# Patient Record
Sex: MALE | Race: OTHER | HISPANIC OR LATINO | Employment: FULL TIME | URBAN - METROPOLITAN AREA
[De-identification: names, ages, dates, MRNs, and addresses within clinical notes are randomized per-mention and may not be internally consistent; named-entity substitution may affect disease eponyms.]

---

## 2024-01-04 ENCOUNTER — HOSPITAL ENCOUNTER (INPATIENT)
Facility: HOSPITAL | Age: 44
LOS: 2 days | Discharge: HOME | DRG: 048 | End: 2024-01-07
Attending: EMERGENCY MEDICINE | Admitting: INTERNAL MEDICINE
Payer: COMMERCIAL

## 2024-01-04 DIAGNOSIS — H70.90 MASTOIDITIS: ICD-10-CM

## 2024-01-04 DIAGNOSIS — H70.001 MASTOIDITIS, ACUTE, RIGHT: ICD-10-CM

## 2024-01-04 DIAGNOSIS — G51.0 CRANIAL NERVE VII PALSY: Primary | ICD-10-CM

## 2024-01-04 PROCEDURE — 99284 EMERGENCY DEPT VISIT MOD MDM: CPT

## 2024-01-05 ENCOUNTER — APPOINTMENT (EMERGENCY)
Dept: CT IMAGING | Facility: HOSPITAL | Age: 44
DRG: 048 | End: 2024-01-05
Payer: COMMERCIAL

## 2024-01-05 PROBLEM — K05.6 PERIODONTAL DISEASE: Status: ACTIVE | Noted: 2024-01-05

## 2024-01-05 PROBLEM — H70.001 MASTOIDITIS, ACUTE, RIGHT: Status: ACTIVE | Noted: 2024-01-05

## 2024-01-05 PROBLEM — G51.0 CRANIAL NERVE VII PALSY: Status: ACTIVE | Noted: 2024-01-05

## 2024-01-05 LAB
ALBUMIN SERPL BCP-MCNC: 4.3 G/DL (ref 3.5–5)
ALP SERPL-CCNC: 61 U/L (ref 34–104)
ALT SERPL W P-5'-P-CCNC: 30 U/L (ref 7–52)
ANION GAP SERPL CALCULATED.3IONS-SCNC: 7 MMOL/L
ANION GAP SERPL CALCULATED.3IONS-SCNC: 8 MMOL/L
APTT PPP: 33 SECONDS (ref 23–37)
AST SERPL W P-5'-P-CCNC: 15 U/L (ref 13–39)
B BURGDOR IGG+IGM SER QL IA: NEGATIVE
BASOPHILS # BLD AUTO: 0.06 THOUSANDS/ÂΜL (ref 0–0.1)
BASOPHILS # BLD MANUAL: 0 THOUSAND/UL (ref 0–0.1)
BASOPHILS NFR BLD AUTO: 1 % (ref 0–1)
BASOPHILS NFR MAR MANUAL: 0 % (ref 0–1)
BILIRUB SERPL-MCNC: 0.47 MG/DL (ref 0.2–1)
BUN SERPL-MCNC: 12 MG/DL (ref 5–25)
BUN SERPL-MCNC: 13 MG/DL (ref 5–25)
CALCIUM SERPL-MCNC: 8.7 MG/DL (ref 8.4–10.2)
CALCIUM SERPL-MCNC: 9.2 MG/DL (ref 8.4–10.2)
CHLORIDE SERPL-SCNC: 109 MMOL/L (ref 96–108)
CHLORIDE SERPL-SCNC: 109 MMOL/L (ref 96–108)
CO2 SERPL-SCNC: 24 MMOL/L (ref 21–32)
CO2 SERPL-SCNC: 24 MMOL/L (ref 21–32)
CREAT SERPL-MCNC: 0.88 MG/DL (ref 0.6–1.3)
CREAT SERPL-MCNC: 1.01 MG/DL (ref 0.6–1.3)
EOSINOPHIL # BLD AUTO: 0.09 THOUSAND/ÂΜL (ref 0–0.61)
EOSINOPHIL # BLD MANUAL: 0.26 THOUSAND/UL (ref 0–0.4)
EOSINOPHIL NFR BLD AUTO: 1 % (ref 0–6)
EOSINOPHIL NFR BLD MANUAL: 2 % (ref 0–6)
ERYTHROCYTE [DISTWIDTH] IN BLOOD BY AUTOMATED COUNT: 15 % (ref 11.6–15.1)
ERYTHROCYTE [DISTWIDTH] IN BLOOD BY AUTOMATED COUNT: 15.2 % (ref 11.6–15.1)
FLUAV RNA RESP QL NAA+PROBE: NEGATIVE
FLUBV RNA RESP QL NAA+PROBE: NEGATIVE
GFR SERPL CREATININE-BSD FRML MDRD: 106 ML/MIN/1.73SQ M
GFR SERPL CREATININE-BSD FRML MDRD: 91 ML/MIN/1.73SQ M
GLUCOSE SERPL-MCNC: 114 MG/DL (ref 65–140)
GLUCOSE SERPL-MCNC: 139 MG/DL (ref 65–140)
HCT VFR BLD AUTO: 42.5 % (ref 36.5–49.3)
HCT VFR BLD AUTO: 43.8 % (ref 36.5–49.3)
HGB BLD-MCNC: 14.1 G/DL (ref 12–17)
HGB BLD-MCNC: 14.5 G/DL (ref 12–17)
IMM GRANULOCYTES # BLD AUTO: 0.13 THOUSAND/UL (ref 0–0.2)
IMM GRANULOCYTES NFR BLD AUTO: 1 % (ref 0–2)
INR PPP: 1.03 (ref 0.84–1.19)
LACTATE SERPL-SCNC: 0.9 MMOL/L (ref 0.5–2)
LG PLATELETS BLD QL SMEAR: PRESENT
LYMPHOCYTES # BLD AUTO: 24 % (ref 14–44)
LYMPHOCYTES # BLD AUTO: 3.16 THOUSAND/UL (ref 0.6–4.47)
LYMPHOCYTES # BLD AUTO: 3.31 THOUSANDS/ÂΜL (ref 0.6–4.47)
LYMPHOCYTES NFR BLD AUTO: 29 % (ref 14–44)
MAGNESIUM SERPL-MCNC: 2.1 MG/DL (ref 1.9–2.7)
MCH RBC QN AUTO: 28.3 PG (ref 26.8–34.3)
MCH RBC QN AUTO: 28.4 PG (ref 26.8–34.3)
MCHC RBC AUTO-ENTMCNC: 33.1 G/DL (ref 31.4–37.4)
MCHC RBC AUTO-ENTMCNC: 33.2 G/DL (ref 31.4–37.4)
MCV RBC AUTO: 85 FL (ref 82–98)
MCV RBC AUTO: 86 FL (ref 82–98)
MONOCYTES # BLD AUTO: 1.2 THOUSAND/ÂΜL (ref 0.17–1.22)
MONOCYTES # BLD AUTO: 1.71 THOUSAND/UL (ref 0–1.22)
MONOCYTES NFR BLD AUTO: 11 % (ref 4–12)
MONOCYTES NFR BLD: 13 % (ref 4–12)
NEUTROPHILS # BLD AUTO: 6.58 THOUSANDS/ÂΜL (ref 1.85–7.62)
NEUTROPHILS # BLD MANUAL: 8.03 THOUSAND/UL (ref 1.85–7.62)
NEUTS BAND NFR BLD MANUAL: 11 % (ref 0–8)
NEUTS SEG NFR BLD AUTO: 50 % (ref 43–75)
NEUTS SEG NFR BLD AUTO: 57 % (ref 43–75)
NRBC BLD AUTO-RTO: 0 /100 WBCS
PLATELET # BLD AUTO: 311 THOUSANDS/UL (ref 149–390)
PLATELET # BLD AUTO: 334 THOUSANDS/UL (ref 149–390)
PLATELET BLD QL SMEAR: ADEQUATE
PMV BLD AUTO: 10.3 FL (ref 8.9–12.7)
PMV BLD AUTO: 10.3 FL (ref 8.9–12.7)
POLYCHROMASIA BLD QL SMEAR: ABNORMAL
POTASSIUM SERPL-SCNC: 3.8 MMOL/L (ref 3.5–5.3)
POTASSIUM SERPL-SCNC: 3.8 MMOL/L (ref 3.5–5.3)
PROCALCITONIN SERPL-MCNC: 0.05 NG/ML
PROT SERPL-MCNC: 7.8 G/DL (ref 6.4–8.4)
PROTHROMBIN TIME: 13.7 SECONDS (ref 11.6–14.5)
RBC # BLD AUTO: 4.96 MILLION/UL (ref 3.88–5.62)
RBC # BLD AUTO: 5.13 MILLION/UL (ref 3.88–5.62)
RBC MORPH BLD: PRESENT
ROULEAUX BLD QL SMEAR: PRESENT
RSV RNA RESP QL NAA+PROBE: NEGATIVE
SARS-COV-2 RNA RESP QL NAA+PROBE: NEGATIVE
SODIUM SERPL-SCNC: 140 MMOL/L (ref 135–147)
SODIUM SERPL-SCNC: 141 MMOL/L (ref 135–147)
WBC # BLD AUTO: 11.37 THOUSAND/UL (ref 4.31–10.16)
WBC # BLD AUTO: 13.16 THOUSAND/UL (ref 4.31–10.16)

## 2024-01-05 PROCEDURE — 85027 COMPLETE CBC AUTOMATED: CPT

## 2024-01-05 PROCEDURE — 87040 BLOOD CULTURE FOR BACTERIA: CPT

## 2024-01-05 PROCEDURE — 87070 CULTURE OTHR SPECIMN AEROBIC: CPT | Performed by: OTOLARYNGOLOGY

## 2024-01-05 PROCEDURE — G1004 CDSM NDSC: HCPCS

## 2024-01-05 PROCEDURE — 99254 IP/OBS CNSLTJ NEW/EST MOD 60: CPT | Performed by: STUDENT IN AN ORGANIZED HEALTH CARE EDUCATION/TRAINING PROGRAM

## 2024-01-05 PROCEDURE — 70481 CT ORBIT/EAR/FOSSA W/DYE: CPT

## 2024-01-05 PROCEDURE — 99285 EMERGENCY DEPT VISIT HI MDM: CPT

## 2024-01-05 PROCEDURE — 84145 PROCALCITONIN (PCT): CPT

## 2024-01-05 PROCEDURE — 80048 BASIC METABOLIC PNL TOTAL CA: CPT

## 2024-01-05 PROCEDURE — 83735 ASSAY OF MAGNESIUM: CPT

## 2024-01-05 PROCEDURE — 87529 HSV DNA AMP PROBE: CPT

## 2024-01-05 PROCEDURE — 80053 COMPREHEN METABOLIC PANEL: CPT

## 2024-01-05 PROCEDURE — 85730 THROMBOPLASTIN TIME PARTIAL: CPT

## 2024-01-05 PROCEDURE — 85025 COMPLETE CBC W/AUTO DIFF WBC: CPT

## 2024-01-05 PROCEDURE — 96375 TX/PRO/DX INJ NEW DRUG ADDON: CPT

## 2024-01-05 PROCEDURE — 96365 THER/PROPH/DIAG IV INF INIT: CPT

## 2024-01-05 PROCEDURE — 87205 SMEAR GRAM STAIN: CPT | Performed by: OTOLARYNGOLOGY

## 2024-01-05 PROCEDURE — 83605 ASSAY OF LACTIC ACID: CPT

## 2024-01-05 PROCEDURE — 99222 1ST HOSP IP/OBS MODERATE 55: CPT

## 2024-01-05 PROCEDURE — 36415 COLL VENOUS BLD VENIPUNCTURE: CPT

## 2024-01-05 PROCEDURE — 85007 BL SMEAR W/DIFF WBC COUNT: CPT

## 2024-01-05 PROCEDURE — 96361 HYDRATE IV INFUSION ADD-ON: CPT

## 2024-01-05 PROCEDURE — 87147 CULTURE TYPE IMMUNOLOGIC: CPT | Performed by: OTOLARYNGOLOGY

## 2024-01-05 PROCEDURE — 0241U HB NFCT DS VIR RESP RNA 4 TRGT: CPT

## 2024-01-05 PROCEDURE — 85610 PROTHROMBIN TIME: CPT

## 2024-01-05 PROCEDURE — 70487 CT MAXILLOFACIAL W/DYE: CPT

## 2024-01-05 PROCEDURE — 70460 CT HEAD/BRAIN W/DYE: CPT

## 2024-01-05 PROCEDURE — 86618 LYME DISEASE ANTIBODY: CPT

## 2024-01-05 RX ORDER — ERYTHROMYCIN 5 MG/G
0.5 OINTMENT OPHTHALMIC
Status: DISCONTINUED | OUTPATIENT
Start: 2024-01-05 | End: 2024-01-07 | Stop reason: HOSPADM

## 2024-01-05 RX ORDER — PREDNISONE 20 MG/1
60 TABLET ORAL DAILY
Status: DISCONTINUED | OUTPATIENT
Start: 2024-01-05 | End: 2024-01-05

## 2024-01-05 RX ORDER — PANTOPRAZOLE SODIUM 40 MG/1
40 TABLET, DELAYED RELEASE ORAL
Status: DISCONTINUED | OUTPATIENT
Start: 2024-01-06 | End: 2024-01-07 | Stop reason: HOSPADM

## 2024-01-05 RX ORDER — ACETAMINOPHEN 325 MG/1
650 TABLET ORAL EVERY 6 HOURS PRN
Status: DISCONTINUED | OUTPATIENT
Start: 2024-01-05 | End: 2024-01-07 | Stop reason: HOSPADM

## 2024-01-05 RX ORDER — DEXAMETHASONE SODIUM PHOSPHATE 10 MG/ML
10 INJECTION, SOLUTION INTRAMUSCULAR; INTRAVENOUS EVERY 8 HOURS SCHEDULED
Status: DISCONTINUED | OUTPATIENT
Start: 2024-01-05 | End: 2024-01-07 | Stop reason: HOSPADM

## 2024-01-05 RX ORDER — CIPROFLOXACIN AND DEXAMETHASONE 3; 1 MG/ML; MG/ML
4 SUSPENSION/ DROPS AURICULAR (OTIC) 2 TIMES DAILY
Status: DISCONTINUED | OUTPATIENT
Start: 2024-01-05 | End: 2024-01-07 | Stop reason: HOSPADM

## 2024-01-05 RX ORDER — ONDANSETRON 2 MG/ML
4 INJECTION INTRAMUSCULAR; INTRAVENOUS EVERY 6 HOURS PRN
Status: DISCONTINUED | OUTPATIENT
Start: 2024-01-05 | End: 2024-01-07 | Stop reason: HOSPADM

## 2024-01-05 RX ORDER — ENOXAPARIN SODIUM 100 MG/ML
40 INJECTION SUBCUTANEOUS DAILY
Status: DISCONTINUED | OUTPATIENT
Start: 2024-01-05 | End: 2024-01-07 | Stop reason: HOSPADM

## 2024-01-05 RX ORDER — MECLIZINE HYDROCHLORIDE 25 MG/1
25 TABLET ORAL ONCE
Status: COMPLETED | OUTPATIENT
Start: 2024-01-05 | End: 2024-01-05

## 2024-01-05 RX ORDER — SODIUM CHLORIDE 9 MG/ML
3 INJECTION INTRAVENOUS ONCE
Status: COMPLETED | OUTPATIENT
Start: 2024-01-05 | End: 2024-01-05

## 2024-01-05 RX ORDER — MAGNESIUM HYDROXIDE/ALUMINUM HYDROXICE/SIMETHICONE 120; 1200; 1200 MG/30ML; MG/30ML; MG/30ML
30 SUSPENSION ORAL EVERY 6 HOURS PRN
Status: DISCONTINUED | OUTPATIENT
Start: 2024-01-05 | End: 2024-01-07 | Stop reason: HOSPADM

## 2024-01-05 RX ORDER — MECLIZINE HCL 12.5 MG/1
25 TABLET ORAL EVERY 8 HOURS PRN
Status: DISCONTINUED | OUTPATIENT
Start: 2024-01-05 | End: 2024-01-07 | Stop reason: HOSPADM

## 2024-01-05 RX ORDER — MINERAL OIL AND PETROLATUM 150; 830 MG/G; MG/G
OINTMENT OPHTHALMIC 3 TIMES DAILY
Status: DISCONTINUED | OUTPATIENT
Start: 2024-01-05 | End: 2024-01-07 | Stop reason: HOSPADM

## 2024-01-05 RX ADMIN — SODIUM CHLORIDE 3 G: 9 INJECTION, SOLUTION INTRAVENOUS at 01:58

## 2024-01-05 RX ADMIN — MECLIZINE HYDROCHLORIDE 25 MG: 25 TABLET ORAL at 05:00

## 2024-01-05 RX ADMIN — VANCOMYCIN HYDROCHLORIDE 2000 MG: 1 INJECTION, POWDER, LYOPHILIZED, FOR SOLUTION INTRAVENOUS at 05:13

## 2024-01-05 RX ADMIN — IOHEXOL 100 ML: 350 INJECTION, SOLUTION INTRAVENOUS at 02:26

## 2024-01-05 RX ADMIN — ACETAMINOPHEN 325MG 650 MG: 325 TABLET ORAL at 05:11

## 2024-01-05 RX ADMIN — VANCOMYCIN HYDROCHLORIDE 1250 MG: 5 INJECTION, POWDER, LYOPHILIZED, FOR SOLUTION INTRAVENOUS at 20:04

## 2024-01-05 RX ADMIN — SODIUM CHLORIDE 1000 ML: 0.9 INJECTION, SOLUTION INTRAVENOUS at 01:46

## 2024-01-05 RX ADMIN — GLYCERIN 2 DROP: .002; .002; .01 SOLUTION/ DROPS OPHTHALMIC at 18:53

## 2024-01-05 RX ADMIN — SODIUM CHLORIDE 3 G: 9 INJECTION, SOLUTION INTRAVENOUS at 21:35

## 2024-01-05 RX ADMIN — SODIUM CHLORIDE 3 G: 9 INJECTION, SOLUTION INTRAVENOUS at 08:45

## 2024-01-05 RX ADMIN — PREDNISONE 60 MG: 20 TABLET ORAL at 08:43

## 2024-01-05 RX ADMIN — MINERAL OIL AND PETROLATUM: 319; 577 GEL OPHTHALMIC at 18:53

## 2024-01-05 RX ADMIN — SODIUM CHLORIDE, PRESERVATIVE FREE 3 ML: 5 INJECTION INTRAVENOUS at 01:58

## 2024-01-05 RX ADMIN — GLYCERIN 2 DROP: .002; .002; .01 SOLUTION/ DROPS OPHTHALMIC at 18:51

## 2024-01-05 RX ADMIN — SODIUM CHLORIDE 3 G: 9 INJECTION, SOLUTION INTRAVENOUS at 16:12

## 2024-01-05 RX ADMIN — DEXAMETHASONE SODIUM PHOSPHATE 10 MG: 10 INJECTION INTRAMUSCULAR; INTRAVENOUS at 18:13

## 2024-01-05 RX ADMIN — ERYTHROMYCIN 0.5 INCH: 5 OINTMENT OPHTHALMIC at 21:35

## 2024-01-05 RX ADMIN — CIPROFLOXACIN AND DEXAMETHASONE 4 DROP: 3; 1 SUSPENSION/ DROPS AURICULAR (OTIC) at 10:37

## 2024-01-05 RX ADMIN — ENOXAPARIN SODIUM 40 MG: 40 INJECTION SUBCUTANEOUS at 08:44

## 2024-01-05 RX ADMIN — CIPROFLOXACIN AND DEXAMETHASONE 4 DROP: 3; 1 SUSPENSION/ DROPS AURICULAR (OTIC) at 18:51

## 2024-01-05 RX ADMIN — DEXAMETHASONE SODIUM PHOSPHATE 10 MG: 10 INJECTION INTRAMUSCULAR; INTRAVENOUS at 21:35

## 2024-01-05 NOTE — ASSESSMENT & PLAN NOTE
"CT facial bones demonstrating \"Dental caries and periodontal disease, most prominent involving the right maxillary molar and left posterior most mandibular molar. Correlation with the patient's symptoms recommended. \"  Outpatient follow-up  "

## 2024-01-05 NOTE — ASSESSMENT & PLAN NOTE
"Patient with no significant PMHx presented to the ED with complaint of three days of bilateral ear \"fullness,\" right ear pain with clear/yellow drainage, dysequilibrium, and right-sided facial weakness  Patient reports flu-like symptoms over a week ago that resolved prior to the onset of his current symptoms. At first patient reports recurrence of congestion, productive cough, and rhinorrhea. These symptoms were later accompanied by bilateral ear pressure followed by sharp pain in his right ear +drainage, decreased hearing, tinnitus, dizziness, and right facial droop   CT facial bones/orbits/temporal demonstrating \"Fluid in the right mastoid air cells, external auditory canal, and middle ear, suspicious for right-sided otomastoiditis. No discrete bony destruction or abscess is identified. Shotty right cervical chain lymph nodes, nonspecific, possibly reactive. Sinus disease as described; consider acute on chronic sinusitis.\"  WBC 13.16, not fulfilling septic criteria  Procalc 0.05, repeat in AM  Lactic 0.9  Blood cultures pending   Supportive care, pain control  Unasyn and vanc initiated by the ED, continue  ENT and ID consults pending   "

## 2024-01-05 NOTE — PLAN OF CARE
Problem: PAIN - ADULT  Goal: Verbalizes/displays adequate comfort level or baseline comfort level  Description: Interventions:  - Encourage patient to monitor pain and request assistance  - Assess pain using appropriate pain scale  - Administer analgesics based on type and severity of pain and evaluate response  - Implement non-pharmacological measures as appropriate and evaluate response  - Consider cultural and social influences on pain and pain management  - Notify physician/advanced practitioner if interventions unsuccessful or patient reports new pain  Outcome: Progressing     Problem: INFECTION - ADULT  Goal: Absence or prevention of progression during hospitalization  Description: INTERVENTIONS:  - Assess and monitor for signs and symptoms of infection  - Monitor lab/diagnostic results  - Monitor all insertion sites, i.e. indwelling lines, tubes, and drains  - Monitor endotracheal if appropriate and nasal secretions for changes in amount and color  - Cameron appropriate cooling/warming therapies per order  - Administer medications as ordered  - Instruct and encourage patient and family to use good hand hygiene technique  - Identify and instruct in appropriate isolation precautions for identified infection/condition  Outcome: Progressing  Goal: Absence of fever/infection during neutropenic period  Description: INTERVENTIONS:  - Monitor WBC    Outcome: Progressing     Problem: SAFETY ADULT  Goal: Patient will remain free of falls  Description: INTERVENTIONS:  - Educate patient/family on patient safety including physical limitations  - Instruct patient to call for assistance with activity   - Consult OT/PT to assist with strengthening/mobility   - Keep Call bell within reach  - Keep bed low and locked with side rails adjusted as appropriate  - Keep care items and personal belongings within reach  - Initiate and maintain comfort rounds  - Make Fall Risk Sign visible to staff  - Offer Toileting every  Hours,  in advance of need  - Initiate/Maintain alarm  - Obtain necessary fall risk management equipment:   - Apply yellow socks and bracelet for high fall risk patients  - Consider moving patient to room near nurses station  Outcome: Progressing  Goal: Maintain or return to baseline ADL function  Description: INTERVENTIONS:  -  Assess patient's ability to carry out ADLs; assess patient's baseline for ADL function and identify physical deficits which impact ability to perform ADLs (bathing, care of mouth/teeth, toileting, grooming, dressing, etc.)  - Assess/evaluate cause of self-care deficits   - Assess range of motion  - Assess patient's mobility; develop plan if impaired  - Assess patient's need for assistive devices and provide as appropriate  - Encourage maximum independence but intervene and supervise when necessary  - Involve family in performance of ADLs  - Assess for home care needs following discharge   - Consider OT consult to assist with ADL evaluation and planning for discharge  - Provide patient education as appropriate  Outcome: Progressing  Goal: Maintains/Returns to pre admission functional level  Description: INTERVENTIONS:  - Perform AM-PAC 6 Click Basic Mobility/ Daily Activity assessment daily.  - Set and communicate daily mobility goal to care team and patient/family/caregiver.   - Collaborate with rehabilitation services on mobility goals if consulted  - Perform Range of Motion  times a day.  - Reposition patient every  hours.  - Dangle patient  times a day  - Stand patient  times a day  - Ambulate patient  times a day  - Out of bed to chair  times a day   - Out of bed for meal times a day  - Out of bed for toileting  - Record patient progress and toleration of activity level   Outcome: Progressing     Problem: DISCHARGE PLANNING  Goal: Discharge to home or other facility with appropriate resources  Description: INTERVENTIONS:  - Identify barriers to discharge w/patient and caregiver  - Arrange for  needed discharge resources and transportation as appropriate  - Identify discharge learning needs (meds, wound care, etc.)  - Arrange for interpretive services to assist at discharge as needed  - Refer to Case Management Department for coordinating discharge planning if the patient needs post-hospital services based on physician/advanced practitioner order or complex needs related to functional status, cognitive ability, or social support system  Outcome: Progressing     Problem: Knowledge Deficit  Goal: Patient/family/caregiver demonstrates understanding of disease process, treatment plan, medications, and discharge instructions  Description: Complete learning assessment and assess knowledge base.  Interventions:  - Provide teaching at level of understanding  - Provide teaching via preferred learning methods  Outcome: Progressing

## 2024-01-05 NOTE — ED PROVIDER NOTES
History  Chief Complaint   Patient presents with    Earache     Right sided ear pain, causing pain and dizziness and swelling around mouth     43-year-old male with no reported past medical history presents ED with complaints of right ear pain, muffled hearing, R ear drainage and weakness over the right side of his face. Patient reports he was having URI symptoms last week.  Reports he started to feel better but when he came back home from [place] his kids were sick and he got sick again.  Reports he was having some cough, nasal congestion and rhinorrhea.  Was having ear congestion bilaterally, ear pressure and muffled hearing which seemed to be worse in his right ear a couple days ago.  Reports the pressure sensation turned into a sharp stabbing pain in the right ear and he began having some clear/yellow discharge from the right ear which has been persistent. About 2 days ago he also noticed that the right side of his face seemed like it was drooping down and he was unable to lift up his eyebrow, close his eye completely and use the right side of his mouth.  When he drank water it was coming out of the right side of his mouth. He felt he had a talk on the left side of his mouth.  He has never had this before.  Has also been having intermittent vertiginous type symptoms which feels like the room is spinning and he is off balance.  He feels that way slightly now but has been worse previously. Also having some tinnitus in the right ear. Nausea with the vertiginous symptoms but no associated vomiting.  Feels like the right side of his face and right side of his lips are swollen.  No fevers or chills.  No neck pain or stiffness.  No associated headache, confusion, speech problems, dysphagia, double vision, visual field loss, vision changes, UE/LE paresthesia/anesthesia/weakness or any other complaints of focal deficits.  Denies chest pain, shortness of breath, palpitations, lightheadedness, syncope, abdominal pain,  N/V/D, urinary complaints, rash, weakness and any other specific complaint.  Has been taking ibuprofen as needed for pain control.  Took 1 dose of amoxicillin last night, reports it felt like it improved his swelling and pain but now he is having the pain in his right ear.        Earache  Associated symptoms: congestion, cough, ear discharge, rhinorrhea and sore throat        None       History reviewed. No pertinent past medical history.    History reviewed. No pertinent surgical history.    History reviewed. No pertinent family history.  I have reviewed and agree with the history as documented.    E-Cigarette/Vaping     E-Cigarette/Vaping Substances          Review of Systems   HENT:  Positive for congestion, ear discharge, ear pain, facial swelling (R), rhinorrhea and sore throat.         (+) R sided facial weakness    Respiratory:  Positive for cough.    Gastrointestinal:  Positive for nausea.   Neurological:  Positive for dizziness and facial asymmetry.       Physical Exam  Physical Exam  Vitals and nursing note reviewed.   Constitutional:       General: He is not in acute distress.     Appearance: He is well-developed.      Comments: Awake, alert, interactive and resting in the stretcher in no acute distress.  Patient is not ill or toxic appearing.     HENT:      Head: Normocephalic and atraumatic.      Comments: Patient with mild swelling over the right side of the face with no specific erythema, bruising, skin disruption, rash or vesicles.  Patient unable to raise the right eyebrow.  Able to close the right eye but not able to squint.  Unable to lift the right corner of his mouth.  Normal facial movements on the left side of the face.       Left Ear: Tympanic membrane, ear canal and external ear normal.      Ears:      Comments: No specific right external ear findings.  No clinical findings of mastoiditis.  He does have a moderate amount of clear/yellow discharge coming from the right ear canal.  Right ear  canal appears mildly edematous with moist and flaking skin in the ear canal.  R TM appears perforated, unable to appreciate all landmarks of the right TM.  Hearing grossly intact in B/L ears.      Mouth/Throat:      Comments: MMM.  Oropharynx patent and clear. Tongue midline.  Maintaining airway and secretions without issue.  Eyes:      General: Vision grossly intact.      Extraocular Movements:      Right eye: Normal extraocular motion and no nystagmus.      Left eye: Normal extraocular motion and no nystagmus.      Conjunctiva/sclera: Conjunctivae normal.      Pupils: Pupils are equal, round, and reactive to light.   Cardiovascular:      Rate and Rhythm: Normal rate and regular rhythm.      Pulses:           Radial pulses are 2+ on the right side and 2+ on the left side.      Heart sounds: No murmur heard.  Pulmonary:      Effort: Pulmonary effort is normal. No respiratory distress.      Breath sounds: Normal breath sounds.   Abdominal:      Palpations: Abdomen is soft.      Tenderness: There is no abdominal tenderness.   Musculoskeletal:         General: No swelling.      Cervical back: Neck supple.      Right lower leg: No edema.      Left lower leg: No edema.      Comments: Normal muscle tone and moving all extremities without issue.  Sensation intact in B/L face, B/L UE and B/L LE.  Strength 5/5 over left side of the face, B/L UE and B/L LE.   Skin:     General: Skin is warm and dry.      Capillary Refill: Capillary refill takes less than 2 seconds.   Neurological:      General: No focal deficit present.      Mental Status: He is alert and oriented to person, place, and time.      GCS: GCS eye subscore is 4. GCS verbal subscore is 5. GCS motor subscore is 6.      Sensory: Sensation is intact.      Motor: Weakness (R side of face) present.      Coordination: Coordination is intact. Finger-Nose-Finger Test and Heel to Shin Test normal.      Gait: Gait is intact. Gait normal.      Comments: CN II- VII Intact  except CN VII over R side of face.    Psychiatric:         Mood and Affect: Mood normal.         Vital Signs  ED Triage Vitals   Temperature Pulse Respirations Blood Pressure SpO2   01/04/24 2244 01/04/24 2244 01/04/24 2244 01/04/24 2253 01/04/24 2244   98 °F (36.7 °C) 88 18 122/73 98 %      Temp Source Heart Rate Source Patient Position - Orthostatic VS BP Location FiO2 (%)   01/04/24 2244 01/04/24 2244 01/04/24 2244 01/04/24 2244 --   Oral Monitor Sitting Right arm       Pain Score       01/04/24 2244       10 - Worst Possible Pain           Vitals:    01/05/24 0937 01/05/24 1211 01/05/24 1437 01/05/24 1535   BP: 141/66 121/58 127/77 116/76   Pulse: 74 74 79 84   Patient Position - Orthostatic VS: Lying Lying Lying Lying         Visual Acuity  Visual Acuity      Flowsheet Row Most Recent Value   L Pupil Size (mm) 2   R Pupil Size (mm) 2   L Pupil Shape Round   R Pupil Shape Round            ED Medications  Medications   acetaminophen (TYLENOL) tablet 650 mg (650 mg Oral Given 1/5/24 0511)   ondansetron (ZOFRAN) injection 4 mg (has no administration in time range)   aluminum-magnesium hydroxide-simethicone (MAALOX) oral suspension 30 mL (has no administration in time range)   meclizine (ANTIVERT) tablet 25 mg (has no administration in time range)   enoxaparin (LOVENOX) subcutaneous injection 40 mg (40 mg Subcutaneous Given 1/5/24 0844)   ampicillin-sulbactam (UNASYN) 3 g in sodium chloride 0.9 % 100 mL IVPB (0 g Intravenous Stopped 1/5/24 1049)   vancomycin (VANCOCIN) 1,250 mg in sodium chloride 0.9 % 250 mL IVPB (has no administration in time range)   predniSONE tablet 60 mg (60 mg Oral Given 1/5/24 0843)   ciprofloxacin-dexamethasone (CIPRODEX) 0.3-0.1 % otic suspension 4 drop (4 drops Right Ear Given 1/5/24 1037)   glycerin-hypromellose- (ARTIFICIAL TEARS) ophthalmic solution 2 drop (has no administration in time range)   sodium chloride (PF) 0.9 % injection 3 mL (3 mL Intravenous Given 1/5/24 0158)    ampicillin-sulbactam (UNASYN) 3 g in sodium chloride 0.9 % 100 mL IVPB (0 g Intravenous Stopped 1/5/24 0234)   sodium chloride 0.9 % bolus 1,000 mL (0 mL Intravenous Stopped 1/5/24 0332)   iohexol (OMNIPAQUE) 350 MG/ML injection (MULTI-DOSE) 100 mL (100 mL Intravenous Given 1/5/24 0226)   meclizine (ANTIVERT) tablet 25 mg (25 mg Oral Given 1/5/24 0500)   vancomycin (VANCOCIN) 2,000 mg in sodium chloride 0.9 % 500 mL IVPB (0 mg Intravenous Stopped 1/5/24 0840)       Diagnostic Studies  Results Reviewed       Procedure Component Value Units Date/Time    Blood culture #2 [[id number]] Collected: 01/05/24 0142    Lab Status: Preliminary result Specimen: Blood from Arm, Left Updated: 01/05/24 1301     Blood Culture Received in Microbiology Lab. Culture in Progress.    Blood culture #1 [[id number]] Collected: 01/05/24 0147    Lab Status: Preliminary result Specimen: Blood from Arm, Right Updated: 01/05/24 1301     Blood Culture Received in Microbiology Lab. Culture in Progress.    Wound culture and Gram stain [[id number]] Collected: 01/05/24 1001    Lab Status: In process Specimen: Wound from Neck Updated: 01/05/24 1009    Basic metabolic panel [[id number]]  (Abnormal) Collected: 01/05/24 0517    Lab Status: Final result Specimen: Blood from Hand, Right Updated: 01/05/24 0547     Sodium 140 mmol/L      Potassium 3.8 mmol/L      Chloride 109 mmol/L      CO2 24 mmol/L      ANION GAP 7 mmol/L      BUN 12 mg/dL      Creatinine 0.88 mg/dL      Glucose 139 mg/dL      Calcium 8.7 mg/dL      eGFR 106 ml/min/1.73sq m     Narrative:      National Kidney Disease Foundation guidelines for Chronic Kidney Disease (CKD):     Stage 1 with normal or high GFR (GFR > 90 mL/min/1.73 square meters)    Stage 2 Mild CKD (GFR = 60-89 mL/min/1.73 square meters)    Stage 3A Moderate CKD (GFR = 45-59 mL/min/1.73 square meters)    Stage 3B Moderate CKD (GFR = 30-44 mL/min/1.73 square meters)    Stage 4 Severe CKD (GFR = 15-29 mL/min/1.73 square  meters)    Stage 5 End Stage CKD (GFR <15 mL/min/1.73 square meters)  Note: GFR calculation is accurate only with a steady state creatinine    Magnesium [[id number]]  (Normal) Collected: 01/05/24 0517    Lab Status: Final result Specimen: Blood from Hand, Right Updated: 01/05/24 0547     Magnesium 2.1 mg/dL     CBC and differential [[id number]]  (Abnormal) Collected: 01/05/24 0517    Lab Status: Final result Specimen: Blood from Hand, Right Updated: 01/05/24 0531     WBC 11.37 Thousand/uL      RBC 4.96 Million/uL      Hemoglobin 14.1 g/dL      Hematocrit 42.5 %      MCV 86 fL      MCH 28.4 pg      MCHC 33.2 g/dL      RDW 15.2 %      MPV 10.3 fL      Platelets 311 Thousands/uL      nRBC 0 /100 WBCs      Neutrophils Relative 57 %      Immat GRANS % 1 %      Lymphocytes Relative 29 %      Monocytes Relative 11 %      Eosinophils Relative 1 %      Basophils Relative 1 %      Neutrophils Absolute 6.58 Thousands/µL      Immature Grans Absolute 0.13 Thousand/uL      Lymphocytes Absolute 3.31 Thousands/µL      Monocytes Absolute 1.20 Thousand/µL      Eosinophils Absolute 0.09 Thousand/µL      Basophils Absolute 0.06 Thousands/µL     Manual Differential(PHLEBS Do Not Order) [[id number]]  (Abnormal) Collected: 01/05/24 0142    Lab Status: Final result Specimen: Blood from Arm, Left Updated: 01/05/24 0454     Segmented % 50 %      Bands % 11 %      Lymphocytes % 24 %      Monocytes % 13 %      Eosinophils, % 2 %      Basophils % 0 %      Absolute Neutrophils 8.03 Thousand/uL      Lymphocytes Absolute 3.16 Thousand/uL      Monocytes Absolute 1.71 Thousand/uL      Eosinophils Absolute 0.26 Thousand/uL      Basophils Absolute 0.00 Thousand/uL      Total Counted --     RBC Morphology Present     Rouleaux Present     Platelet Estimate Adequate     Large Platelet Present     Polychromasia 1+    FLU/RSV/COVID - if FLU/RSV clinically relevant [[id number]]  (Normal) Collected: 01/05/24 0142    Lab Status: Final result Specimen: Nares from  Nose Updated: 01/05/24 0241     SARS-CoV-2 Negative     INFLUENZA A PCR Negative     INFLUENZA B PCR Negative     RSV PCR Negative    Narrative:      FOR PEDIATRIC PATIENTS - copy/paste COVID Guidelines URL to browser: [email or web address]    SARS-CoV-2 assay is a Nucleic Acid Amplification assay intended for the  qualitative detection of nucleic acid from SARS-CoV-2 in nasopharyngeal  swabs. Results are for the presumptive identification of SARS-CoV-2 RNA.    Positive results are indicative of infection with SARS-CoV-2, the virus  causing COVID-19, but do not rule out bacterial infection or co-infection  with other viruses. Laboratories within the [place] and its  territories are required to report all positive results to the appropriate  public health authorities. Negative results do not preclude SARS-CoV-2  infection and should not be used as the sole basis for treatment or other  patient management decisions. Negative results must be combined with  clinical observations, patient history, and epidemiological information.  This test has not been FDA cleared or approved.    This test has been authorized by FDA under an Emergency Use Authorization  (EUA). This test is only authorized for the duration of time the  declaration that circumstances exist justifying the authorization of the  emergency use of an in vitro diagnostic tests for detection of SARS-CoV-2  virus and/or diagnosis of COVID-19 infection under section 564(b)(1) of  the Act, 21 U.S.C. 360bbb-3(b)(1), unless the authorization is terminated  or revoked sooner. The test has been validated but independent review by FDA  and CLIA is pending.    Test performed using Scope 5: This RT-PCR assay targets N2,  a region unique to SARS-CoV-2. A conserved region in the E-gene was chosen  for pan-Sarbecovirus detection which includes SARS-CoV-2.    According to CMS-2020-01-R, this platform meets  the definition of high-throughput technology.    Procalcitonin [[id number]]  (Normal) Collected: 01/05/24 0142    Lab Status: Final result Specimen: Blood from Arm, Left Updated: 01/05/24 0229     Procalcitonin 0.05 ng/ml     CBC and differential [[id number]]  (Abnormal) Collected: 01/05/24 0142    Lab Status: Final result Specimen: Blood from Arm, Left Updated: 01/05/24 0224     WBC 13.16 Thousand/uL      RBC 5.13 Million/uL      Hemoglobin 14.5 g/dL      Hematocrit 43.8 %      MCV 85 fL      MCH 28.3 pg      MCHC 33.1 g/dL      RDW 15.0 %      MPV 10.3 fL      Platelets 334 Thousands/uL     Protime-INR [[id number]]  (Normal) Collected: 01/05/24 0142    Lab Status: Final result Specimen: Blood from Arm, Left Updated: 01/05/24 0221     Protime 13.7 seconds      INR 1.03    APTT [[id number]]  (Normal) Collected: 01/05/24 0142    Lab Status: Final result Specimen: Blood from Arm, Left Updated: 01/05/24 0221     PTT 33 seconds     Lactic acid [[id number]]  (Normal) Collected: 01/05/24 0142    Lab Status: Final result Specimen: Blood from Arm, Left Updated: 01/05/24 0218     LACTIC ACID 0.9 mmol/L     Narrative:      Result may be elevated if tourniquet was used during collection.    Comprehensive metabolic panel [[id number]]  (Abnormal) Collected: 01/05/24 0142    Lab Status: Final result Specimen: Blood from Arm, Left Updated: 01/05/24 0218     Sodium 141 mmol/L      Potassium 3.8 mmol/L      Chloride 109 mmol/L      CO2 24 mmol/L      ANION GAP 8 mmol/L      BUN 13 mg/dL      Creatinine 1.01 mg/dL      Glucose 114 mg/dL      Calcium 9.2 mg/dL      AST 15 U/L      ALT 30 U/L      Alkaline Phosphatase 61 U/L      Total Protein 7.8 g/dL      Albumin 4.3 g/dL      Total Bilirubin 0.47 mg/dL      eGFR 91 ml/min/1.73sq m     Narrative:      National Kidney Disease Foundation guidelines for Chronic Kidney Disease (CKD):     Stage 1 with normal or high GFR (GFR > 90 mL/min/1.73 square meters)    Stage 2 Mild CKD (GFR = 60-89  mL/min/1.73 square meters)    Stage 3A Moderate CKD (GFR = 45-59 mL/min/1.73 square meters)    Stage 3B Moderate CKD (GFR = 30-44 mL/min/1.73 square meters)    Stage 4 Severe CKD (GFR = 15-29 mL/min/1.73 square meters)    Stage 5 End Stage CKD (GFR <15 mL/min/1.73 square meters)  Note: GFR calculation is accurate only with a steady state creatinine    Lyme Total AB W Reflex to IGM/IGG [[id number]] Collected: 01/05/24 0142    Lab Status: In process Specimen: Blood from Arm, Left Updated: 01/05/24 0201    Narrative:      The following orders were created for panel order Lyme Total AB W Reflex to IGM/IGG.  Procedure                               Abnormality         Status                     ---------                               -----------         ------                     Lyme Total AB W Reflex t...[[id number]]                      In process                   Please view results for these tests on the individual orders.    Lyme Total AB W Reflex to IGM/IGG [[id number]] Collected: 01/05/24 0142    Lab Status: In process Specimen: Blood from Arm, Left Updated: 01/05/24 0201    HSV TYPE 1,2 DNA PCR [[id number]] Collected: 01/05/24 0151    Lab Status: In process Specimen: Blood from Arm, Left Updated: 01/05/24 0201                   CT head w contrast   Final Result by Wilton Campos DO (01/05 0348)      CT orbits/temporal bones/skull base w contrast   Final Result by Wilton Campos DO (01/05 0348)      CT facial bones with contrast   Final Result by Wilton Campos DO (01/05 0348)                 Procedures  Procedures         ED Course  ED Course as of 01/05/24 1557   Fri Jan 05, 2024 0219 WBC(!): 13.16   0219 Hemoglobin: 14.5   0219 Comprehensive metabolic panel(!)   0219 LACTIC ACID: 0.9   0219 Blood Pressure: 122/73   0219 Temperature: 98 °F (36.7 °C)   0220 Pulse: 88   0220 Respirations: 18   0220 SpO2: 98 %   0255 FLU/RSV/COVID - if FLU/RSV clinically relevant  Negative   0255  PTT: 33   0255 POCT INR: 1.03   0255 Procalcitonin: 0.05   0401 IMPRESSION     Fluid in the right mastoid air cells, external auditory canal, and middle ear, suspicious for right-sided otomastoiditis. No discrete bony destruction or abscess is identified.     Dental caries and periodontal disease, most prominent involving the right maxillary molar and left posterior most mandibular molar. Correlation with the patient's symptoms recommended.     Shotty right cervical chain lymph nodes, nonspecific, possibly reactive.     Sinus disease as described; consider acute on chronic sinusitis.     No acute intracranial process is seen otherwise. Other findings as above.        0418 Will add on additional vancomycin in light of mastoiditis findings.   0418 Will plan to admit patient to [facility] in light of findings of mastoiditis and cranial nerve VII palsy.  Bedside reevaluation patient sleeping comfortably in stretcher no acute distress.  Easily arousable, awake, alert and interactive.  Is feeling some vertiginous symptoms at this time.  Will put in meclizine.  Patient agreeable to admission.  He has no other complaints this time.   0425 Discussed case in detail with [facility].  Agrees to inpatient admission under Dr. West.  Patient stable at time of admission.                  Stroke Assessment       Row Name 01/05/24 0045             NIH Stroke Scale    Interval Baseline      Level of Consciousness (1a.) 0      LOC Questions (1b.) 0      LOC Commands (1c.) 0      Best Gaze (2.) 0      Visual (3.) 0      Facial Palsy (4.) 3      Motor Arm, Left (5a.) 0      Motor Arm, Right (5b.) 0      Motor Leg, Left (6a.) 0      Motor Leg, Right (6b.) 0      Limb Ataxia (7.) 0      Sensory (8.) 0      Best Language (9.) 0      Dysarthria (10.) 0      Extinction and Inattention (11.) (Formerly Neglect) 0      Total 3                    Flowsheet Row Most Recent Value   Thrombolytic Decision Options    Thrombolytic Decision Patient not a  candidate.   Patient is not a candidate options Unclear time of onset outside appropriate time window.                   Initial Sepsis Screening       Row Name 01/05/24 0456                Is the patient's history suggestive of a new or worsening infection? Yes (Proceed)  -OB        Suspected source of infection soft tissue  -OB        Indicate SIRS criteria Leukocytosis (WBC > 21435 IJL) OR Leukopenia (WBC <4000 IJL) OR Bandemia (WBC >10% bands)  -OB        Are two or more of the above signs & symptoms of infection both present and new to the patient? No  -OB                  User Key  (r) = Recorded By, (t) = Taken By, (c) = Cosigned By      Initials Name Provider Type    OB Marvin Bunch PA-C Physician Assistant                    SBIRT 20yo+      Flowsheet Row Most Recent Value   Initial Alcohol Screen: US AUDIT-C     1. How often do you have a drink containing alcohol? 0 Filed at: 01/04/2024 2252   2. How many drinks containing alcohol do you have on a typical day you are drinking?  0 Filed at: 01/04/2024 2252   3a. Male UNDER 65: How often do you have five or more drinks on one occasion? 0 Filed at: 01/04/2024 2252   Audit-C Score 0 Filed at: 01/04/2024 2252   EVERARDO: How many times in the past year have you...    Used an illegal drug or used a prescription medication for non-medical reasons? Never Filed at: 01/04/2024 2252                      Medical Decision Making  DDx including but not limited to:  Bell's palsy, deep space infection versus abscess putting pressure on cranial nerve VII, Lyme disease, viral infections (including herpes zoster), tumor, otitis media, otitis externa, leukemia, lymphoma, neuropathy, vasculitis, rheumatologic disease.  Doubt TIA, CVA, meningitis.  In light of patient's presenting complaints and findings on PE will obtain a CBC for marked leukocytosis and anemia, CMP for liver enzymes, electrolytes and renal function.  In light of concern for possible deeper space  infection versus abscess we will also obtain other infectious labs although patient not meeting SIRS criteria at this time.  Will also obtain Lyme and HSV labs in light of likely Bell palsy.  COVID/flu/RSV in light of recent viral-like symptoms.  CT head, CT facial bones and CT temporal bones in light of concern for infection and findings of Bell palsy on exam.  Will treat supportively with 1 L NS, Unasyn.  Discussed plan in detail with Dr. Matthews who was agreeable to workup and plan.  Patient likely require admission however will reevaluate after above-stated plan.  Patient agreeable to this plan.    Amount and/or Complexity of Data Reviewed  Labs: ordered. Decision-making details documented in ED Course.  Radiology: ordered.    Risk  Prescription drug management.  Decision regarding hospitalization.             Disposition  Final diagnoses:   Cranial nerve VII palsy   Mastoiditis     Time reflects when diagnosis was documented in both MDM as applicable and the Disposition within this note       Time User Action Codes Description Comment    1/5/2024  4:02 AM Marvin Bunch Add [G51.0] Cranial nerve VII palsy     1/5/2024  4:02 AM Marvin Bunch Add [H70.90] Mastoiditis     1/5/2024  4:44 AM Antoine Kilgore Add [K05.6] Periodontal disease     1/5/2024  4:44 AM Antoine Kilgore Remove [K05.6] Periodontal disease     1/5/2024  4:44 AM Antoine Kilgore Add [H70.001] Otomastoiditis, acute, right           ED Disposition       ED Disposition   Admit    Condition   Stable    Date/Time   Fri Jan 5, 2024 0427    Comment   Case was discussed with PRANAY and the patient's admission status was agreed to be Admission Status: inpatient status to the service of Dr. West.               Follow-up Information    None         There are no discharge medications for this patient.      No discharge procedures on file.    PDMP Review       None            ED Provider  Electronically Signed by             Marvin Bunch PA-C  01/05/24  8379

## 2024-01-05 NOTE — CONSULTS
"Consultation - Neurology   Jhonny Eastman 43 y.o. male MRN: [id number]  Unit/Bed#: ED-04 Encounter: [id number]      Assessment/Plan     Cranial nerve VII palsy  Assessment & Plan  43-year-old male with no pertinent past medical history presents with right facial droop.  Patient notes onset of R postauricular pain 2-3 days ago, with discharge from the right ear, impaired hearing on right. The morning of 1/5 he developed R facial droop involving the R forehead, impaired R eyelid closure, and R lower facial weakness. Patient also experiencing a \"whooshing\" sound in his right ear and impaired taste.    CT head, orbits, and facial bones obtained and significant for findings concerning for otomastoiditis and possibly reactive cervical chain lymph nodes.    Exam significant for moderately dense right seventh cranial nerve palsy, with involvement of the right forehead, weak right eyelid closure, and right lower facial droop.  Patient had diminished hearing on the right and was noted to have active drainage from the right ear.  CN VII palsy likely due to inflammation related to otomastoiditis. ENT on board.    Plan:  -Steroid treatment as per ENT.  -Lyme serology pending.  -Eye care discussed with patient, including using medical tape/eye shield on R eye when sleeping, wearing sunglasses outside to protect from wind/debris, eye drops PRN (though patient currently complaining more of tearing than dryness).  -Treatment of otomastoiditis as per ENT.  -No further inpatient neuro recs.          Jhonny Eastman will not need outpatient follow up with Neurology. He will not require outpatient neurological testing.    History of Present Illness     Reason for Consult / Principal Problem: R facial droop  Hx and PE limited by: n/a  HPI: Jhonny Eastman is a 43 y.o. right handed male with no pertinent past medical history presents with right facial droop.    Patient reports URI/flulike symptoms approximately 1 to 2 weeks ago.  2 to 3 days ago, " he developed bilateral ear fullness and muffled hearing, pain, and drainage out of the right ear.  He also noted postauricular pain on the right.  He became concerned yesterday as he had a several minute episode of feeling as though he was rocking on a boat, accompanied by nausea.  This morning when he awoke, he thought that he had some swelling around the right side of his mouth, but when he looked in the mirror, he realized he was having difficulty closing the right eye and the right side of his mouth was drooping. The right eye has been tearing. He was having difficulty eating/drinking as the materials were dribbling out of his mouth and he also noted a strange taste.  He also has noticed a whooshing type sound in the right ear.    He denied numbness, weakness elsewhere, visual change/diplopia, balance issues or difficulty walking.  Mild slurred speech this morning but otherwise no speech difficulty.    CT head, orbits, and facial bones obtained and significant for findings concerning for otomastoiditis and possibly reactive cervical chain lymph nodes.    He was started on Unasyn, vancomycin, and prednisone 60 mg.    Exam significant for moderately dense right seventh cranial nerve palsy, with involvement of the right forehead, weak right eyelid closure, and right lower facial droop.  Patient had diminished hearing on the right and was noted to have active discharge from the right ear.    Inpatient consult to Neurology  Consult performed by: Zuleyma Granger PA-C  Consult ordered by: Antoine Kilgore PA-C          Review of Systems   Constitutional: Negative.    HENT:  Positive for ear discharge, hearing loss and tinnitus.    Eyes: Negative.    Respiratory: Negative.     Cardiovascular: Negative.    Gastrointestinal: Negative.    Endocrine: Negative.    Genitourinary: Negative.    Musculoskeletal: Negative.    Skin:  Negative for rash.   Allergic/Immunologic: Negative.    Neurological:  Positive for dizziness  and facial asymmetry.        As above.   Hematological: Negative.    Psychiatric/Behavioral: Negative.         Historical Information   History reviewed. No pertinent past medical history.  History reviewed. No pertinent surgical history.  Social History   Social History     Substance and Sexual Activity   Alcohol Use None     Social History     Substance and Sexual Activity   Drug Use Not on file     E-Cigarette/Vaping     E-Cigarette/Vaping Substances     Social History     Tobacco Use   Smoking Status Not on file   Smokeless Tobacco Not on file     Family History: History reviewed. No pertinent family history.    Review of previous medical records was completed.     Meds/Allergies   PTA meds:   None       No Known Allergies    Objective   Vitals:Blood pressure 141/66, pulse 74, temperature 98 °F (36.7 °C), temperature source Oral, resp. rate 18, height 6' (1.829 m), weight 119 kg (263 lb 3.7 oz), SpO2 97%.,Body mass index is 35.7 kg/m².    Intake/Output Summary (Last 24 hours) at 1/5/2024 1203  Last data filed at 1/5/2024 1049  Gross per 24 hour   Intake 1600 ml   Output --   Net 1600 ml       Invasive Devices:   Invasive Devices       Peripheral Intravenous Line  Duration             Peripheral IV 01/05/24 Left Antecubital <1 day                    Physical Exam  General:  Patient is well-developed, well-nourished, and in no acute distress.  HEENT:  Head normocephalic.  Eyes anicteric. Active drainage from R ear.   Cardiovascular:  With regular rhythm.  Lungs:  Normal effort. Nonlabored breathing.  Extremities:  With no significant edema.    Skin: No rashes.    Neurologic Exam  Neurologic:  Patient is alert, pleasantly interactive, and appropriately conversational.  No obvious symbolic language difficulty but with very subtle dysarthria, and the patient is fully oriented.      Gait deferred for safety.    Cranial Nerves:   II: Visual fields full to confrontation. Pupils equal, round, reactive to light with  normal accomodation.  Cannot visualize optic fundi.  III,IV,VI: extraocular movements intact with no nystagmus.   V: Sensation in the V1 through V3 distributions intact to light touch bilaterally.   VII: Unable to raise R side of forehead; weak R eyelid closure; dense R lower facial droop.  VIII: Diminished hearing R ear as compared to L.  IX/X: Uvula midline. Soft palate elevation symmetric.   XII: Tongue midline with no atrophy or fasciculations with appropriate movement.     Coordination:  Accurate with finger-to-nose and heel-to-shin maneuvers bilaterally.    Motor testing with no upper or lower extremity drift. Full strength to confrontation throughout.    Sensory testing grossly intact to light touch throughout.     Diffusely hyporeflexic throughout.    Toe responses are downgoing bilaterally.     Lab Results: CBC:   Results from last 7 days   Lab Units 01/05/24  0517 01/05/24  0142   WBC Thousand/uL 11.37* 13.16*   RBC Million/uL 4.96 5.13   HEMOGLOBIN g/dL 14.1 14.5   HEMATOCRIT % 42.5 43.8   MCV fL 86 85   PLATELETS Thousands/uL 311 334   , BMP/CMP:   Results from last 7 days   Lab Units 01/05/24  0517 01/05/24  0142   SODIUM mmol/L 140 141   POTASSIUM mmol/L 3.8 3.8   CHLORIDE mmol/L 109* 109*   CO2 mmol/L 24 24   BUN mg/dL 12 13   CREATININE mg/dL 0.88 1.01   CALCIUM mg/dL 8.7 9.2   AST U/L  --  15   ALT U/L  --  30   ALK PHOS U/L  --  61   EGFR ml/min/1.73sq m 106 91     Imaging Studies: I have personally reviewed pertinent reports.   and I have personally reviewed pertinent films in PACS [facility]  EKG, Pathology, and Other Studies: I have personally reviewed pertinent reports.    VTE Prophylaxis: Sequential compression device (Venodyne)     Code Status: Level 1 - Full Code  Advance Directive and Living Will:      Power of :    POLST:

## 2024-01-05 NOTE — PROGRESS NOTES
Jhonny Eastman is a 43 y.o. male who is currently ordered Vancomycin IV with management by the Pharmacy Consult service.  Relevant clinical data and objective / subjective history reviewed.  Vancomycin Assessment:  Indication and Goal AUC/Trough: Bone/joint infection (goal -600, trough >10)  Micro:     Renal Function:  SCr: 1.01 mg/dL  CrCl: 128.2 mL/min  Renal replacement: not on dialysis  Days of Therapy: 1  Current Dose: 2000 mg IV loading dose  Vancomycin Plan:  New Dosin mg IV loading dose  Estimated AUC: 451 mcg*hr/mL  Estimated Trough: 14.4 mcg/mL  Next Level:  with AM labs  Renal Function Monitoring: Daily BMP and UOP  Pharmacy will continue to follow closely for s/sx of nephrotoxicity, infusion reactions and appropriateness of therapy.  BMP and CBC will be ordered per protocol. We will continue to follow the patient’s culture results and clinical progress daily.    Mary Davis, Pharmacist

## 2024-01-05 NOTE — H&P
"[facility]  H&P  Name: Jhonny Eastman 43 y.o. male I MRN: [id number]  Unit/Bed#: ED-04 I Date of Admission: 1/4/2024   Date of Service: 1/5/2024 I Hospital Day: 0      Assessment/Plan   * Otomastoiditis, acute, right  Assessment & Plan  Patient with no significant PMHx presented to the ED with complaint of three days of bilateral ear \"fullness,\" right ear pain with clear/yellow drainage, dysequilibrium, and right-sided facial weakness  Patient reports flu-like symptoms over a week ago that resolved prior to the onset of his current symptoms. At first patient reports recurrence of congestion, productive cough, and rhinorrhea. These symptoms were later accompanied by bilateral ear pressure followed by sharp pain in his right ear +drainage, decreased hearing, tinnitus, dizziness, and right facial droop   CT facial bones/orbits/temporal demonstrating \"Fluid in the right mastoid air cells, external auditory canal, and middle ear, suspicious for right-sided otomastoiditis. No discrete bony destruction or abscess is identified. Shotty right cervical chain lymph nodes, nonspecific, possibly reactive. Sinus disease as described; consider acute on chronic sinusitis.\"  WBC 13.16, not fulfilling septic criteria  Procalc 0.05, repeat in AM  Lactic 0.9  Blood cultures pending   Supportive care, pain control  Unasyn and vanc initiated by the ED, continue  ENT and ID consults pending     Cranial nerve VII palsy  Assessment & Plan  Patient reports that shortly after his right ear symptoms developed, he noted he had a right-sided facial droop about two days ago.  Pt states he is unable to lift his right eyebrow, close his right eye, or move the right-side of his month. Pt also reports feelings of dizziness, tinnitus, and dysequilibrium with movements, not present at rest  He denies any other neurologic symptoms such as headache, one-sided weakness/numbness, visual changes, or aphasia  CT head demonstrating " "\"no acute intracranial processes\"  Lyme titer, HSV PCR pending   Likely secondary to otomastoiditis   Continue neuro checks   Will initiate patient on 60 mg prednisone  Neurology consult pending     Periodontal disease  Assessment & Plan  CT facial bones demonstrating \"Dental caries and periodontal disease, most prominent involving the right maxillary molar and left posterior most mandibular molar. Correlation with the patient's symptoms recommended. \"  Outpatient follow-up         VTE Pharmacologic Prophylaxis: VTE Score: 3 Moderate Risk (Score 3-4) - Pharmacological DVT Prophylaxis Ordered: enoxaparin (Lovenox).  Code Status: Level 1 - Full Code   Discussion with family: Patient declined call to .     Anticipated Length of Stay: Patient will be admitted on an inpatient basis with an anticipated length of stay of greater than 2 midnights secondary to otomastoiditis, facial nerve VII palsy.    Total Time Spent on Date of Encounter in care of patient: 65 minutes This time was spent on one or more of the following: performing physical exam; counseling and coordination of care; obtaining or reviewing history; documenting in the medical record; reviewing/ordering tests, medications or procedures; communicating with other healthcare professionals and discussing with patient's family/caregivers.    Chief Complaint: \"I have had really bad ear pain x 3 days\"    History of Present Illness:  Jhonny Eastman is a 43 y.o. male who presents with acute right otomastoiditis with facial nerve VII palsy.  Patient reports that about over a week ago he began with flulike symptoms.  He states that he had fever, chills, congestion, productive cough, myalgias, and rhinorrhea at that time.  He reports that he was visiting his kids in [place] at the time and upon returning home he had felt better.  He reports that about 3 days ago his symptoms began again.  He reports that they began with congestion, rhinorrhea, and productive " cough.  However they progressed to bilateral ear fullness followed by severe sharp pain in his right ear with tinnitus and drainage.  He also reports associated decreased hearing on that side with dizziness and disequilibrium.  He reports that he does not have dizziness at rest however with movement he becomes very dizzy and nauseous.  Patient reports that then about 2 days ago he began with right-sided facial droop.  He reports that he noted he was unable to lift his right eyebrow, close his right eye, or raise the right side of his mouth.  He reports that he noted that when he drinks water it comes out of the right side of his mouth.  He reports that with the reoccurrence of his symptoms he has not had any fever, chills, or myalgias.  He denies any other associated neurological symptoms such as headache, one-sided weakness, visual changes, or numbness.  Patient reports some speech difficulty secondary to not being able to raise right side of his mouth however denies any true aphasia.  Denies any other associated symptoms such as chest pain, palpitations, shortness of breath, abdominal pain, vomiting, pedal edema, or urinary symptoms.    Review of Systems:  Review of Systems   Constitutional:  Positive for fatigue. Negative for appetite change, chills, diaphoresis and fever.   HENT:  Positive for congestion, drooling, ear discharge (R), ear pain (R), hearing loss (Reduced right-sided), rhinorrhea, sinus pressure, sinus pain and tinnitus (R ear). Negative for sore throat.    Eyes:  Negative for photophobia and visual disturbance.   Respiratory:  Positive for cough (productive). Negative for shortness of breath and wheezing.    Cardiovascular:  Negative for chest pain, palpitations and leg swelling.   Gastrointestinal:  Positive for nausea. Negative for abdominal distention, abdominal pain, blood in stool, constipation, diarrhea and vomiting.   Genitourinary:  Negative for dysuria and hematuria.   Musculoskeletal:   Negative for arthralgias, back pain, myalgias and neck stiffness.   Skin:  Negative for color change and rash.   Neurological:  Positive for dizziness and facial asymmetry (Right-sided facial droop). Negative for tremors, seizures, syncope, speech difficulty, weakness, light-headedness, numbness and headaches.   Psychiatric/Behavioral:  Negative for agitation, behavioral problems and confusion. The patient is not nervous/anxious.    All other systems reviewed and are negative.      Past Medical and Surgical History:   History reviewed. No pertinent past medical history.    History reviewed. No pertinent surgical history.    Meds/Allergies:  Prior to Admission medications    Not on File     I have reviewed home medications with patient personally.    Allergies: No Known Allergies    Social History:  Marital Status: Single   Patient Pre-hospital Living Situation: Home  Patient Pre-hospital Level of Mobility: walks  Patient Pre-hospital Diet Restrictions: none  Substance Use History:   Social History     Substance and Sexual Activity   Alcohol Use None     Social History     Tobacco Use   Smoking Status Not on file   Smokeless Tobacco Not on file     Social History     Substance and Sexual Activity   Drug Use Not on file       Family History:  History reviewed. No pertinent family history.    Physical Exam:     Vitals:   Blood Pressure: 118/65 (01/05/24 0507)  Pulse: 82 (01/05/24 0507)  Temperature: 98 °F (36.7 °C) (01/04/24 2244)  Temp Source: Oral (01/04/24 2244)  Respirations: 18 (01/05/24 0507)  Height: 6' (182.9 cm) (01/05/24 0421)  Weight - Scale: 119 kg (263 lb 3.7 oz) (01/04/24 2244)  SpO2: 98 % (01/05/24 0507)    Physical Exam  Vitals and nursing note reviewed.   Constitutional:       General: He is not in acute distress.     Appearance: He is well-developed.   HENT:      Head: Normocephalic and atraumatic.      Right Ear: Ear canal and external ear normal.      Left Ear: Tympanic membrane, ear canal and  external ear normal.      Ears:      Comments: Right TM appears perforated with drainage, decreased hearing on that side     Nose: Congestion and rhinorrhea present.      Mouth/Throat:      Mouth: Mucous membranes are dry.      Pharynx: Oropharynx is clear.   Eyes:      General: No visual field deficit.     Conjunctiva/sclera: Conjunctivae normal.   Cardiovascular:      Rate and Rhythm: Normal rate and regular rhythm.      Heart sounds: Normal heart sounds. No murmur heard.     No friction rub. No gallop.   Pulmonary:      Effort: Pulmonary effort is normal. No respiratory distress.      Breath sounds: Normal breath sounds. No wheezing, rhonchi or rales.   Abdominal:      General: Bowel sounds are normal. There is no distension.      Palpations: Abdomen is soft.      Tenderness: There is no abdominal tenderness.   Musculoskeletal:      Cervical back: Neck supple.      Right lower leg: No edema.      Left lower leg: No edema.   Skin:     General: Skin is warm and dry.      Capillary Refill: Capillary refill takes less than 2 seconds.   Neurological:      General: No focal deficit present.      Mental Status: He is alert and oriented to person, place, and time. Mental status is at baseline.      Cranial Nerves: Facial asymmetry (right-sided facial droop) present.      Sensory: Sensation is intact.      Motor: No weakness or tremor.      Coordination: Coordination is intact.      Comments: Patient unable to lift right eyebrow, close right eye, or raise right corner of mouth. Pt reports sensation right side of face intact.    Psychiatric:         Mood and Affect: Mood normal.         Behavior: Behavior normal.          Additional Data:     Lab Results:  Results from last 7 days   Lab Units 01/05/24  0142   WBC Thousand/uL 13.16*   HEMOGLOBIN g/dL 14.5   HEMATOCRIT % 43.8   PLATELETS Thousands/uL 334   BANDS PCT % 11*   LYMPHO PCT % 24   MONO PCT % 13*   EOS PCT % 2     Results from last 7 days   Lab Units 01/05/24  0142    SODIUM mmol/L 141   POTASSIUM mmol/L 3.8   CHLORIDE mmol/L 109*   CO2 mmol/L 24   BUN mg/dL 13   CREATININE mg/dL 1.01   ANION GAP mmol/L 8   CALCIUM mg/dL 9.2   ALBUMIN g/dL 4.3   TOTAL BILIRUBIN mg/dL 0.47   ALK PHOS U/L 61   ALT U/L 30   AST U/L 15   GLUCOSE RANDOM mg/dL 114     Results from last 7 days   Lab Units 01/05/24  0142   INR  1.03             Results from last 7 days   Lab Units 01/05/24  0142   LACTIC ACID mmol/L 0.9   PROCALCITONIN ng/ml 0.05       Lines/Drains:  Invasive Devices       Peripheral Intravenous Line  Duration             Peripheral IV 01/05/24 Left Antecubital <1 day                        Imaging: Reviewed radiology reports from this admission including: CT head and CT facial bones, CT orbits/temporal bones/skull base   CT head w contrast   Final Result by Wilton Campos DO (01/05 0348)      CT orbits/temporal bones/skull base w contrast   Final Result by Wilton Campos DO (01/05 0348)      CT facial bones with contrast   Final Result by Wilton Campos DO (01/05 0348)          EKG and Other Studies Reviewed on Admission:   EKG: No EKG obtained.    ** Please Note: This note has been constructed using a voice recognition system. **

## 2024-01-05 NOTE — ASSESSMENT & PLAN NOTE
"Patient reports that shortly after his right ear symptoms developed, he noted he had a right-sided facial droop about two days ago.  Pt states he is unable to lift his right eyebrow, close his right eye, or move the right-side of his month. Pt also reports feelings of dizziness, tinnitus, and dysequilibrium with movements, not present at rest  He denies any other neurologic symptoms such as headache, one-sided weakness/numbness, visual changes, or aphasia  CT head demonstrating \"no acute intracranial processes\"  Lyme titer, HSV PCR pending   Likely secondary to otomastoiditis   Continue neuro checks   Will initiate patient on 60 mg prednisone  Neurology consult pending   "

## 2024-01-05 NOTE — ASSESSMENT & PLAN NOTE
"43-year-old male with no pertinent past medical history presents with right facial droop.  Patient notes onset of R postauricular pain 2-3 days ago, with discharge from the right ear, impaired hearing on right. The morning of 1/5 he developed R facial droop involving the R forehead, impaired R eyelid closure, and R lower facial weakness. Patient also experiencing a \"whooshing\" sound in his right ear and impaired taste.    CT head, orbits, and facial bones obtained and significant for findings concerning for otomastoiditis and possibly reactive cervical chain lymph nodes.    Exam significant for moderately dense right seventh cranial nerve palsy, with involvement of the right forehead, weak right eyelid closure, and right lower facial droop.  Patient had diminished hearing on the right and was noted to have active drainage from the right ear.  CN VII palsy likely due to inflammation related to otomastoiditis. ENT on board.    Plan:  -Steroid treatment as per ENT.  -Lyme serology pending.  -Eye care discussed with patient, including using medical tape/eye shield on R eye when sleeping, wearing sunglasses outside to protect from wind/debris, eye drops PRN (though patient currently complaining more of tearing than dryness).  -Treatment of otomastoiditis as per ENT.  -No further inpatient neuro recs.  "

## 2024-01-05 NOTE — PROGRESS NOTES
Jhonny Eastman is a 43 y.o. male who is currently ordered Vancomycin IV with management by the Pharmacy Consult service.  Relevant clinical data and objective / subjective history reviewed.  Vancomycin Assessment:  Indication and Goal AUC/Trough: Bone/joint infection (goal -600, trough >10)  Micro:     Renal Function:  SCr: 1.01 mg/dL  CrCl: 147.2 ml/min  Renal replacement: not on dialysis  Days of Therapy: 1  Current Dose: 2000 mg IV loading dose  Vancomycin Plan:  New Dosinmg IV q12h  Estimated AUC: 459 mcg*hr/mL  Estimated Trough: 14.7 mcg/mL  Next Level:  with AM labs  Renal Function Monitoring: Daily BMP and UOP  Pharmacy will continue to follow closely for s/sx of nephrotoxicity, infusion reactions and appropriateness of therapy.  BMP and CBC will be ordered per protocol. We will continue to follow the patient’s culture results and clinical progress daily.    Mehdi Garrett, Pharmacist

## 2024-01-05 NOTE — ED ATTENDING ATTESTATION
1/4/2024  I, Luis Matthews Jr, DO, saw and evaluated the patient. I have discussed the patient with the resident/non-physician practitioner and agree with the resident's/non-physician practitioner's findings, Plan of Care, and MDM as documented in the resident's/non-physician practitioner's note, except where noted. All available labs and Radiology studies were reviewed.  I was present for key portions of any procedure(s) performed by the resident/non-physician practitioner and I was immediately available to provide assistance.       At this point I agree with the current assessment done in the Emergency Department.  I have conducted an independent evaluation of this patient a history and physical is as follows:    I supervised the Advanced Practitioner.? I performed, in its entirety, the assessment and plan component of the visit.  I agree with the Advanced Practitioner's note with the following assessment and plan:      Right Sided Bell's Palsy.  Secondary to an acute viral or bacterial process in the right ear.  Perforated right tympanic membrane from probable viral or bacterial etiology  Mild right-sided facial swelling    Obtain septic workup and CTs to evaluate for possible underlying abscess that could be causing compression to the underlying nerves.  Also add on a Lyme profile and HSV.    CT shows right-sided otomastoiditis.     - IV abx and admit to [facility]  Luis Matthews Jr, DO 01/05/24         Luis Matthews Jr, DO 01/05/24       ED Course           Critical Care Time  Procedures

## 2024-01-05 NOTE — CONSULTS
Consultation - ENT   Jhonny Eastman 43 y.o. male MRN: [id number]  Unit/Bed#: ED-04 Encounter: [id number]      Assessment/Plan     Assessment:  Otomastoiditis right  Otorrhea right  Facial palsy -House -Brackman 1-2/8 or severe with eye presently protected noting complete eye closure with active motion > passive motion.   Acute pansinusitis   Deviated nasal septum left  Plan:  High-dose steroid taper over the next 12 to 15 days.   - May start with Decadron 10 mg every 8 hours and eventually switch to prednisone 60 mg each morning x 5 days, decreased to 40 mg each morning x 5 days followed by 20 mg each morning x 5 days.  2. Sinusitis noted on CT imaging - nasal endoscopy performed no mucopus noted to perform C&S.   3. C&S of right ear performed    - Await Lyme titers and HSV titers.   - Continue present abx (Unasyn and Vancomycin) -await ID input.   - Start ciprodex drops 4 drops right ear BID. Place cotton after application x 1 hour, then may remove cotton.   4. Eye protection: artificial tears OD q 1-2 hours throughout the day. Tape right eye closed at night . Presently patient able to perform active closure of right eye (consider moisture chamber, ophthalmology consult if eye closure function worsens).   C&S of right ear performed  and TM appears grossly intact with ? Of central microperforation.   Patient follow up outpatient for reevaluation with audiometry once stable, on oral medications.      - Continue to monitor facial function, specifically right eye function to avoid corneal abrasion right.    History of Present Illness   Physician Requesting Consult: Jonas Mott DO  Reason for Consult / Principal Problem: otomastoiditis, right facial palsy  HPI: Jhonny Eastman is a 43 y.o. year old male who presents with 3-day history of flulike symptoms including sore throat nasal congestion and otorrhea from the right ear described as yellow fluty discharge.  The patient reports he had the flu 2 weeks ago.  He went to  [place] over the holidays with his family and his children became sick.  3 days ago he also became sick with above symptoms.  He developed fever yesterday as well as noted facial weakness on the right side prompting him to come to the emergency room.  He reports no prior history of recurrent ear infections states he has had 1 ear infection over the course of his whole life.  He admits to muffled hearing on the right but otherwise hearing seems to be relatively intact in both ears.  He denies associated dizziness or visual disturbance.   Flu, COVID and RSV testing negative today.  Lyme titers and HSV titers pending.  Patient reports had some ear pain yesterday but today the ear pain is almost completely resolved    Inpatient consult to ENT  Consult performed by: Adeel Muniz DO  Consult ordered by: Antoine Kilgore PA-C          Review of Systems   Constitutional:  Negative for chills and fever.   HENT:  Positive for congestion and ear discharge. Negative for ear pain, sore throat, trouble swallowing and voice change.         Facial weakness right - acute   Eyes:  Negative for pain and visual disturbance.   Respiratory:  Negative for cough and shortness of breath.    Cardiovascular:  Negative for chest pain and palpitations.   Gastrointestinal:  Negative for abdominal pain and vomiting.   Genitourinary:  Negative for dysuria and hematuria.   Musculoskeletal:  Negative for arthralgias and back pain.   Skin:  Negative for color change and rash.   Neurological:  Positive for facial asymmetry. Negative for seizures and syncope.   All other systems reviewed and are negative.      Historical Information   History reviewed. No pertinent past medical history.  History reviewed. No pertinent surgical history.  Social History   Social History     Substance and Sexual Activity   Alcohol Use None     Social History     Substance and Sexual Activity   Drug Use Not on file     E-Cigarette/Vaping     E-Cigarette/Vaping  Substances     Social History     Tobacco Use   Smoking Status Not on file   Smokeless Tobacco Not on file     Family History: History reviewed. No pertinent family history.    Meds/Allergies   all current active meds have been reviewed, current meds:   Current Facility-Administered Medications   Medication Dose Route Frequency    acetaminophen (TYLENOL) tablet 650 mg  650 mg Oral Q6H PRN    aluminum-magnesium hydroxide-simethicone (MAALOX) oral suspension 30 mL  30 mL Oral Q6H PRN    ampicillin-sulbactam (UNASYN) 3 g in sodium chloride 0.9 % 100 mL IVPB  3 g Intravenous Q6H    enoxaparin (LOVENOX) subcutaneous injection 40 mg  40 mg Subcutaneous Daily    meclizine (ANTIVERT) tablet 25 mg  25 mg Oral Q8H PRN    ondansetron (ZOFRAN) injection 4 mg  4 mg Intravenous Q6H PRN    predniSONE tablet 60 mg  60 mg Oral Daily    vancomycin (VANCOCIN) 1,250 mg in sodium chloride 0.9 % 250 mL IVPB  1,250 mg Intravenous Q12H   , and PTA meds:   None       No Known Allergies    Objective       Intake/Output Summary (Last 24 hours) at 1/5/2024 0945  Last data filed at 1/5/2024 0840  Gross per 24 hour   Intake 1500 ml   Output --   Net 1500 ml       Invasive Devices       Peripheral Intravenous Line  Duration             Peripheral IV 01/05/24 Left Antecubital <1 day                    Physical Exam    CONSTITUTION:    appears appropriate for age.    No evidence of any acute distress.    Communicates normally.    Voice quality is clear.    Alert and oriented.    HEAD/FACE:    atraumatic, normocephalic on inspection.    No scars present.    Salivary glands are normal in texture and size without any asymmetry.    Facial nerve function is asymmetric  with facial palsy right (H-B 2/8 - patient is protecting right eye with active motion - complete eye closure.     EYES:    Extraocular muscles intact in both eyes, normal gaze bilaterally and no evidence of nystagmus.    Pupils equal, round, and accommodate to light bilaterally.    EARS:     External ears normal.    External canal left clear and dry.  Right with yellow discharge medial canal - C&S performed with TM viewed, Right TM  suspicious for central microperforation  Tympanic membrane left intact with normal mobility, no effusion, no retraction, no perforation.    Post auricular area is normal - non fluctuance or rubor right. Nontender right or left mastoid    NOSE:    External nose without deformity.    Internal mucosa pink and moist .    Septum deviated left     Nasal turbinates edematous.    ORAL CAVITY:    lips normal and healthy in appearance.    Dentition normal.    Gums healthy, pink and moist.    Tongue appears pink and moist with no lesions.    Floor of mouth pink, moist, and smooth.    Submandibular ducts patent with clear saliva.    Parotid ducts patent with clear saliva.    Oral mucosa pink and moist.    Hard palate normal in appearance without any lesions.    OROPHARYNX:    soft palate pink and moist without any lesions.    Uvula midline without any lesions.    Tonsils grade 1 bilaterally.    Posterior pharynx pink and moist without any lesions or purulent draiange    NECK:    supple and symmetric.    No masses noted.    Trachea midline.    No thyromegaly or nodules noted.    LYMPH:    No palpable adenopathy in left or right neck    SKIN:    No rashes. No lesions noted.     Nasal Endoscopy (non-operated)     Because only the anterior portion of the turbinates and anterior nasal septum could be visualized, it was elected to proceed with nasal endoscopy for visualization of the posterior nasal chamber, middle meatal area, sphenoid recess and nasopharynx.  Verbal consent was obtained from the patient / parent.  The nasal cavity was sprayed with a solution of Phenylephrine:Lidocaine (1:1).  After waiting an appropriate amount of time for the medication to take effect the procedure was completed with the flexible endoscope.  Findings are as follows:    Floor of Nose:  smooth mucosa  without any mass or obstruction    Septum: deviated left without any perforation    Inferior turbinates:   mucosa intact, edematous b/l    Inferior meatus: Normal appearing moist mucosa with no lesions bilaterally    Middle turbinates: edematous, pink, moist  scant mucous webbing right    Superior turbinates: Normal in size, pink, moist, no abnormalities    Superior meatus: Normal appearing moist mucosa with no lesions bilaterally    Middle meatus And ostiomeatal unit:  Intact mucosa without any pus, polyps.      Sphenoid os: Normal mucosa without mucopus or polyps    Sphenoethmoid recess: pink, moist with no polyps or abnormalities bilaterally    Nasopharynx:  No masses.  Smooth pink mucosa    Eustachian tube orifice:  Pink mucosa, patent, no obstruction.     Patient tolerated procedure without difficulty.  Patient instructed to avoid eating/drinking ×30 minutes or until anesthetic effect completely dissipated.      Lab Results: I have personally reviewed pertinent lab results.  , CBC:   Lab Results   Component Value Date    WBC 11.37 (H) 01/05/2024    HGB 14.1 01/05/2024    HCT 42.5 01/05/2024    MCV 86 01/05/2024     01/05/2024    RBC 4.96 01/05/2024    MCH 28.4 01/05/2024    MCHC 33.2 01/05/2024    RDW 15.2 (H) 01/05/2024    MPV 10.3 01/05/2024    NRBC 0 01/05/2024   , CMP:   Lab Results   Component Value Date    SODIUM 140 01/05/2024    K 3.8 01/05/2024     (H) 01/05/2024    CO2 24 01/05/2024    BUN 12 01/05/2024    CREATININE 0.88 01/05/2024    CALCIUM 8.7 01/05/2024    AST 15 01/05/2024    ALT 30 01/05/2024    ALKPHOS 61 01/05/2024    EGFR 106 01/05/2024   , Coags:   Lab Results   Component Value Date    PTT 33 01/05/2024    INR 1.03 01/05/2024     CT head w contrast    Result Date: 1/5/2024  Narrative: CT BRAIN - WITH AND WITHOUT CONTRAST INDICATION:   Right-sided ear pain r/o deep space infxn/abscess. COMPARISON:  None. TECHNIQUE:  CT examination of the brain was performed both prior to and  after the administration of intravenous contrast.  Multiplanar 2D reformatted images were created from the source data. Radiation dose length product (DLP) for this visit:  881 mGy-cm  (accession [id number]), 375 mGy-cm  (accession [id number]), 1131 mGy-cm  (accession [id number]).  This examination, like all CT scans performed in the [facility], was performed utilizing techniques to minimize radiation dose exposure, including the use of iterative reconstruction and automated exposure control. IV Contrast:  100 mL of iohexol (OMNIPAQUE) IMAGE QUALITY:  Diagnostic. FINDINGS: PARENCHYMA:  No intracranial mass, mass effect or midline shift. No CT signs of acute territorial infarction.  No acute parenchymal hemorrhage. Gray-white differentiation appears maintained. Post contrast imaging of the brain is grossly normal. VENTRICLES AND EXTRA-AXIAL SPACES:  Normal for patient's age. VISUALIZED ORBITS: Normal visualized orbits. PARANASAL SINUSES: Small to moderate amount of fluid in the right sphenoid sinus with a small amount of fluid dependently in the left maxillary sinus and a moderate to large amount of fluid in the right maxillary sinus. Opacification of several bilateral  ethmoid air cells. Mucosal thickening in the sphenoid and maxillary sinuses and small polyp/mucous retention cyst in the left maxillary sinus, consider acute on chronic sinusitis. CALVARIUM: The calvarium appears intact. CT FACIAL BONES WITHOUT INTRAVENOUS CONTRAST INDICATION:   r/o deep space infxn/abscess. COMPARISON: None. TECHNIQUE:  Axial CT images were obtained through the facial bones with additional sagittal and coronal reconstructions. Radiation dose length product (DLP) for this visit:  881 mGy-cm  (accession [id number]), 375 mGy-cm  (accession [id number]), 1131 mGy-cm  (accession [id number]).  This examination, like all CT scans performed in the [facility], was performed utilizing techniques to minimize radiation dose  exposure, including the use of iterative reconstruction and automated exposure control. IMAGE QUALITY:  Diagnostic. FINDINGS: FACIAL BONES:   No facial bone fracture identified.  Normal alignment of the temporomandibular joints.  No suspicious appearing osseous lesion. Dental caries and periodontal disease, most prominent involving the right maxillary molar and left posterior most mandibular molar. ORBITS:  Orbital globes, optic nerves, and extraocular muscles appear symmetric and normal. There is no evidence of retrobulbar mass, abscess, or hematoma. SINUSES:  Small to moderate amount of fluid in the right sphenoid sinus with a small amount of fluid dependently in the left maxillary sinus and a moderate to large amount of fluid in the right maxillary sinus. Opacification of several bilateral ethmoid air cells. Mucosal thickening in the sphenoid and maxillary sinuses and small polyp/mucous retention cyst in the left maxillary sinus, consider acute on chronic sinusitis. SOFT TISSUES: Shotty right cervical chain lymph nodes, nonspecific, possibly reactive. CT TEMPORAL BONES WITH CONTRAST INDICATION:   r/o deep space infxn/abscess. COMPARISON:  None. TECHNIQUE: Using a multi-detector scanner, 0.625 mm axial scans of the temporal bone were acquired using a high-resolution bone technique.  Targeted reconstructions were obtained of each side, including axial, coronal, and oblique views.  All reconstructed images were reviewed. Soft tissue reconstructions were performed as well. Radiation dose length product (DLP) for this visit:  881 mGy-cm  (accession [id number]), 375 mGy-cm  (accession [id number]), 1131 mGy-cm  (accession [id number]).  This examination, like all CT scans performed in the [facility], was performed utilizing techniques to minimize radiation dose exposure, including the use of iterative reconstruction and automated exposure control. IV Contrast: Was administered IMAGE QUALITY:  Diagnostic.  FINDINGS: RIGHT TEMPORAL BONE: PERIAURICULAR SOFT TISSUES: Grossly unremarkable EXTERNAL AUDITORY CANAL: Fluid in the external auditory canal MIDDLE EAR: Fluid in the middle ear OSSICLES:Normal. MASTOID AIR CELLS: Fluid in the mastoid air cells COCHLEA: Normal. VESTIBULAR AND COCHLEAR AQUEDUCT: Normal SEMICIRCULAR CANALS: Normal. INTERNAL AUDITORY CANAL: Normal. CAROTID CANAL: Normal. JUGULAR FORAMEN: Hypoplastic LEFT TEMPORAL BONE: PERIAURICULAR SOFT TISSUES:  Normal. EXTERNAL AUDITORY CANAL: Normal. MIDDLE EAR: Normal. OSSICLES:Normal. MASTOID AIR CELLS: Normal. COCHLEA: Normal. VESTIBULAR AND COCHLEAR AQUEDUCT: Normal SEMICIRCULAR CANALS: Normal. INTERNAL AUDITORY CANAL: Normal. CAROTID CANAL: Normal. JUGULAR FORAMEN: Normal. IMPRESSION Fluid in the right mastoid air cells, external auditory canal, and middle ear, suspicious for right-sided otomastoiditis. No discrete bony destruction or abscess is identified. Dental caries and periodontal disease, most prominent involving the right maxillary molar and left posterior most mandibular molar. Correlation with the patient's symptoms recommended. Shotty right cervical chain lymph nodes, nonspecific, possibly reactive. Sinus disease as described; consider acute on chronic sinusitis. No acute intracranial process is seen otherwise. Other findings as above. The study was marked in EPIC for immediate notification. Workstation performed: [id number]     CT orbits/temporal bones/skull base w contrast    Result Date: 1/5/2024  Narrative: CT BRAIN - WITH AND WITHOUT CONTRAST INDICATION:   Right-sided ear pain r/o deep space infxn/abscess. COMPARISON:  None. TECHNIQUE:  CT examination of the brain was performed both prior to and after the administration of intravenous contrast.  Multiplanar 2D reformatted images were created from the source data. Radiation dose length product (DLP) for this visit:  881 mGy-cm  (accession [id number]), 375 mGy-cm  (accession [id number]), 1131 mGy-cm   (accession [id number]).  This examination, like all CT scans performed in the [facility], was performed utilizing techniques to minimize radiation dose exposure, including the use of iterative reconstruction and automated exposure control. IV Contrast:  100 mL of iohexol (OMNIPAQUE) IMAGE QUALITY:  Diagnostic. FINDINGS: PARENCHYMA:  No intracranial mass, mass effect or midline shift. No CT signs of acute territorial infarction.  No acute parenchymal hemorrhage. Gray-white differentiation appears maintained. Post contrast imaging of the brain is grossly normal. VENTRICLES AND EXTRA-AXIAL SPACES:  Normal for patient's age. VISUALIZED ORBITS: Normal visualized orbits. PARANASAL SINUSES: Small to moderate amount of fluid in the right sphenoid sinus with a small amount of fluid dependently in the left maxillary sinus and a moderate to large amount of fluid in the right maxillary sinus. Opacification of several bilateral  ethmoid air cells. Mucosal thickening in the sphenoid and maxillary sinuses and small polyp/mucous retention cyst in the left maxillary sinus, consider acute on chronic sinusitis. CALVARIUM: The calvarium appears intact. CT FACIAL BONES WITHOUT INTRAVENOUS CONTRAST INDICATION:   r/o deep space infxn/abscess. COMPARISON: None. TECHNIQUE:  Axial CT images were obtained through the facial bones with additional sagittal and coronal reconstructions. Radiation dose length product (DLP) for this visit:  881 mGy-cm  (accession [id number]), 375 mGy-cm  (accession [id number]), 1131 mGy-cm  (accession [id number]).  This examination, like all CT scans performed in the [facility], was performed utilizing techniques to minimize radiation dose exposure, including the use of iterative reconstruction and automated exposure control. IMAGE QUALITY:  Diagnostic. FINDINGS: FACIAL BONES:   No facial bone fracture identified.  Normal alignment of the temporomandibular joints.  No suspicious appearing  osseous lesion. Dental caries and periodontal disease, most prominent involving the right maxillary molar and left posterior most mandibular molar. ORBITS:  Orbital globes, optic nerves, and extraocular muscles appear symmetric and normal. There is no evidence of retrobulbar mass, abscess, or hematoma. SINUSES:  Small to moderate amount of fluid in the right sphenoid sinus with a small amount of fluid dependently in the left maxillary sinus and a moderate to large amount of fluid in the right maxillary sinus. Opacification of several bilateral ethmoid air cells. Mucosal thickening in the sphenoid and maxillary sinuses and small polyp/mucous retention cyst in the left maxillary sinus, consider acute on chronic sinusitis. SOFT TISSUES: Shotty right cervical chain lymph nodes, nonspecific, possibly reactive. CT TEMPORAL BONES WITH CONTRAST INDICATION:   r/o deep space infxn/abscess. COMPARISON:  None. TECHNIQUE: Using a multi-detector scanner, 0.625 mm axial scans of the temporal bone were acquired using a high-resolution bone technique.  Targeted reconstructions were obtained of each side, including axial, coronal, and oblique views.  All reconstructed images were reviewed. Soft tissue reconstructions were performed as well. Radiation dose length product (DLP) for this visit:  881 mGy-cm  (accession [id number]), 375 mGy-cm  (accession [id number]), 1131 mGy-cm  (accession [id number]).  This examination, like all CT scans performed in the [facility], was performed utilizing techniques to minimize radiation dose exposure, including the use of iterative reconstruction and automated exposure control. IV Contrast: Was administered IMAGE QUALITY:  Diagnostic. FINDINGS: RIGHT TEMPORAL BONE: PERIAURICULAR SOFT TISSUES: Grossly unremarkable EXTERNAL AUDITORY CANAL: Fluid in the external auditory canal MIDDLE EAR: Fluid in the middle ear OSSICLES:Normal. MASTOID AIR CELLS: Fluid in the mastoid air cells COCHLEA:  Normal. VESTIBULAR AND COCHLEAR AQUEDUCT: Normal SEMICIRCULAR CANALS: Normal. INTERNAL AUDITORY CANAL: Normal. CAROTID CANAL: Normal. JUGULAR FORAMEN: Hypoplastic LEFT TEMPORAL BONE: PERIAURICULAR SOFT TISSUES:  Normal. EXTERNAL AUDITORY CANAL: Normal. MIDDLE EAR: Normal. OSSICLES:Normal. MASTOID AIR CELLS: Normal. COCHLEA: Normal. VESTIBULAR AND COCHLEAR AQUEDUCT: Normal SEMICIRCULAR CANALS: Normal. INTERNAL AUDITORY CANAL: Normal. CAROTID CANAL: Normal. JUGULAR FORAMEN: Normal. IMPRESSION Fluid in the right mastoid air cells, external auditory canal, and middle ear, suspicious for right-sided otomastoiditis. No discrete bony destruction or abscess is identified. Dental caries and periodontal disease, most prominent involving the right maxillary molar and left posterior most mandibular molar. Correlation with the patient's symptoms recommended. Shotty right cervical chain lymph nodes, nonspecific, possibly reactive. Sinus disease as described; consider acute on chronic sinusitis. No acute intracranial process is seen otherwise. Other findings as above. The study was marked in EPIC for immediate notification. Workstation performed: [id number]     CT facial bones with contrast    Result Date: 1/5/2024  Narrative: CT BRAIN - WITH AND WITHOUT CONTRAST INDICATION:   Right-sided ear pain r/o deep space infxn/abscess. COMPARISON:  None. TECHNIQUE:  CT examination of the brain was performed both prior to and after the administration of intravenous contrast.  Multiplanar 2D reformatted images were created from the source data. Radiation dose length product (DLP) for this visit:  881 mGy-cm  (accession [id number]), 375 mGy-cm  (accession [id number]), 1131 mGy-cm  (accession [id number]).  This examination, like all CT scans performed in the [facility], was performed utilizing techniques to minimize radiation dose exposure, including the use of iterative reconstruction and automated exposure control. IV Contrast:   100 mL of iohexol (OMNIPAQUE) IMAGE QUALITY:  Diagnostic. FINDINGS: PARENCHYMA:  No intracranial mass, mass effect or midline shift. No CT signs of acute territorial infarction.  No acute parenchymal hemorrhage. Gray-white differentiation appears maintained. Post contrast imaging of the brain is grossly normal. VENTRICLES AND EXTRA-AXIAL SPACES:  Normal for patient's age. VISUALIZED ORBITS: Normal visualized orbits. PARANASAL SINUSES: Small to moderate amount of fluid in the right sphenoid sinus with a small amount of fluid dependently in the left maxillary sinus and a moderate to large amount of fluid in the right maxillary sinus. Opacification of several bilateral  ethmoid air cells. Mucosal thickening in the sphenoid and maxillary sinuses and small polyp/mucous retention cyst in the left maxillary sinus, consider acute on chronic sinusitis. CALVARIUM: The calvarium appears intact. CT FACIAL BONES WITHOUT INTRAVENOUS CONTRAST INDICATION:   r/o deep space infxn/abscess. COMPARISON: None. TECHNIQUE:  Axial CT images were obtained through the facial bones with additional sagittal and coronal reconstructions. Radiation dose length product (DLP) for this visit:  881 mGy-cm  (accession [id number]), 375 mGy-cm  (accession [id number]), 1131 mGy-cm  (accession [id number]).  This examination, like all CT scans performed in the [facility], was performed utilizing techniques to minimize radiation dose exposure, including the use of iterative reconstruction and automated exposure control. IMAGE QUALITY:  Diagnostic. FINDINGS: FACIAL BONES:   No facial bone fracture identified.  Normal alignment of the temporomandibular joints.  No suspicious appearing osseous lesion. Dental caries and periodontal disease, most prominent involving the right maxillary molar and left posterior most mandibular molar. ORBITS:  Orbital globes, optic nerves, and extraocular muscles appear symmetric and normal. There is no evidence of  retrobulbar mass, abscess, or hematoma. SINUSES:  Small to moderate amount of fluid in the right sphenoid sinus with a small amount of fluid dependently in the left maxillary sinus and a moderate to large amount of fluid in the right maxillary sinus. Opacification of several bilateral ethmoid air cells. Mucosal thickening in the sphenoid and maxillary sinuses and small polyp/mucous retention cyst in the left maxillary sinus, consider acute on chronic sinusitis. SOFT TISSUES: Shotty right cervical chain lymph nodes, nonspecific, possibly reactive. CT TEMPORAL BONES WITH CONTRAST INDICATION:   r/o deep space infxn/abscess. COMPARISON:  None. TECHNIQUE: Using a multi-detector scanner, 0.625 mm axial scans of the temporal bone were acquired using a high-resolution bone technique.  Targeted reconstructions were obtained of each side, including axial, coronal, and oblique views.  All reconstructed images were reviewed. Soft tissue reconstructions were performed as well. Radiation dose length product (DLP) for this visit:  881 mGy-cm  (accession [id number]), 375 mGy-cm  (accession [id number]), 1131 mGy-cm  (accession [id number]).  This examination, like all CT scans performed in the [facility], was performed utilizing techniques to minimize radiation dose exposure, including the use of iterative reconstruction and automated exposure control. IV Contrast: Was administered IMAGE QUALITY:  Diagnostic. FINDINGS: RIGHT TEMPORAL BONE: PERIAURICULAR SOFT TISSUES: Grossly unremarkable EXTERNAL AUDITORY CANAL: Fluid in the external auditory canal MIDDLE EAR: Fluid in the middle ear OSSICLES:Normal. MASTOID AIR CELLS: Fluid in the mastoid air cells COCHLEA: Normal. VESTIBULAR AND COCHLEAR AQUEDUCT: Normal SEMICIRCULAR CANALS: Normal. INTERNAL AUDITORY CANAL: Normal. CAROTID CANAL: Normal. JUGULAR FORAMEN: Hypoplastic LEFT TEMPORAL BONE: PERIAURICULAR SOFT TISSUES:  Normal. EXTERNAL AUDITORY CANAL: Normal. MIDDLE EAR:  Normal. OSSICLES:Normal. MASTOID AIR CELLS: Normal. COCHLEA: Normal. VESTIBULAR AND COCHLEAR AQUEDUCT: Normal SEMICIRCULAR CANALS: Normal. INTERNAL AUDITORY CANAL: Normal. CAROTID CANAL: Normal. JUGULAR FORAMEN: Normal. IMPRESSION Fluid in the right mastoid air cells, external auditory canal, and middle ear, suspicious for right-sided otomastoiditis. No discrete bony destruction or abscess is identified. Dental caries and periodontal disease, most prominent involving the right maxillary molar and left posterior most mandibular molar. Correlation with the patient's symptoms recommended. Shotty right cervical chain lymph nodes, nonspecific, possibly reactive. Sinus disease as described; consider acute on chronic sinusitis. No acute intracranial process is seen otherwise. Other findings as above. The study was marked in EPIC for immediate notification. Workstation performed: [id number]     Imaging Studies: I have personally reviewed pertinent films in PACS  EKG, Pathology, and Other Studies: I have personally reviewed pertinent reports.      Code Status: Level 1 - Full Code  Advance Directive and Living Will:      Power of :    POLST:      3 I spent 30 minutes evaluating and culturing the patient as well as performing nasal endoscopy.  I discussed treatment with the patient as well as need to protect his right in setting of facial paresis on the right.  The case was discussed in detail with his attending physician.  Will follow hospital course and when stable patient does need follow-up for reevaluation with audiometry outpatient clinic.  I provided information to the patient.

## 2024-01-06 LAB
ANION GAP SERPL CALCULATED.3IONS-SCNC: 8 MMOL/L
BUN SERPL-MCNC: 13 MG/DL (ref 5–25)
CALCIUM SERPL-MCNC: 9.1 MG/DL (ref 8.4–10.2)
CHLORIDE SERPL-SCNC: 107 MMOL/L (ref 96–108)
CO2 SERPL-SCNC: 24 MMOL/L (ref 21–32)
CREAT SERPL-MCNC: 0.81 MG/DL (ref 0.6–1.3)
GFR SERPL CREATININE-BSD FRML MDRD: 110 ML/MIN/1.73SQ M
GLUCOSE SERPL-MCNC: 188 MG/DL (ref 65–140)
POTASSIUM SERPL-SCNC: 4.1 MMOL/L (ref 3.5–5.3)
SODIUM SERPL-SCNC: 139 MMOL/L (ref 135–147)

## 2024-01-06 PROCEDURE — 99233 SBSQ HOSP IP/OBS HIGH 50: CPT | Performed by: INTERNAL MEDICINE

## 2024-01-06 PROCEDURE — 80048 BASIC METABOLIC PNL TOTAL CA: CPT | Performed by: INTERNAL MEDICINE

## 2024-01-06 RX ADMIN — DEXAMETHASONE SODIUM PHOSPHATE 10 MG: 10 INJECTION INTRAMUSCULAR; INTRAVENOUS at 21:30

## 2024-01-06 RX ADMIN — SODIUM CHLORIDE 3 G: 9 INJECTION, SOLUTION INTRAVENOUS at 15:46

## 2024-01-06 RX ADMIN — SODIUM CHLORIDE 3 G: 9 INJECTION, SOLUTION INTRAVENOUS at 04:58

## 2024-01-06 RX ADMIN — VANCOMYCIN HYDROCHLORIDE 1250 MG: 5 INJECTION, POWDER, LYOPHILIZED, FOR SOLUTION INTRAVENOUS at 08:52

## 2024-01-06 RX ADMIN — DEXAMETHASONE SODIUM PHOSPHATE 10 MG: 10 INJECTION INTRAMUSCULAR; INTRAVENOUS at 05:29

## 2024-01-06 RX ADMIN — CIPROFLOXACIN AND DEXAMETHASONE 4 DROP: 3; 1 SUSPENSION/ DROPS AURICULAR (OTIC) at 08:52

## 2024-01-06 RX ADMIN — SODIUM CHLORIDE 3 G: 9 INJECTION, SOLUTION INTRAVENOUS at 21:30

## 2024-01-06 RX ADMIN — ERYTHROMYCIN 0.5 INCH: 5 OINTMENT OPHTHALMIC at 21:30

## 2024-01-06 RX ADMIN — CIPROFLOXACIN AND DEXAMETHASONE 4 DROP: 3; 1 SUSPENSION/ DROPS AURICULAR (OTIC) at 17:12

## 2024-01-06 RX ADMIN — MINERAL OIL AND PETROLATUM: 319; 577 GEL OPHTHALMIC at 09:28

## 2024-01-06 RX ADMIN — VANCOMYCIN HYDROCHLORIDE 1250 MG: 5 INJECTION, POWDER, LYOPHILIZED, FOR SOLUTION INTRAVENOUS at 19:33

## 2024-01-06 RX ADMIN — SODIUM CHLORIDE 3 G: 9 INJECTION, SOLUTION INTRAVENOUS at 11:09

## 2024-01-06 RX ADMIN — ENOXAPARIN SODIUM 40 MG: 40 INJECTION SUBCUTANEOUS at 08:52

## 2024-01-06 RX ADMIN — DEXAMETHASONE SODIUM PHOSPHATE 10 MG: 10 INJECTION INTRAMUSCULAR; INTRAVENOUS at 14:08

## 2024-01-06 RX ADMIN — GLYCERIN 2 DROP: .002; .002; .01 SOLUTION/ DROPS OPHTHALMIC at 08:52

## 2024-01-06 RX ADMIN — PANTOPRAZOLE SODIUM 40 MG: 40 TABLET, DELAYED RELEASE ORAL at 05:29

## 2024-01-06 RX ADMIN — MINERAL OIL AND PETROLATUM: 319; 577 GEL OPHTHALMIC at 17:12

## 2024-01-06 RX ADMIN — GLYCERIN 2 DROP: .002; .002; .01 SOLUTION/ DROPS OPHTHALMIC at 15:46

## 2024-01-06 NOTE — ASSESSMENT & PLAN NOTE
"CT facial bones demonstrating \"Dental caries and periodontal disease, most prominent involving the right maxillary molar and left posterior most mandibular molar. Correlation with the patient's symptoms recommended. \"  Outpatient follow-up  No evidence of abscess to necessitate transfer to [facility]  "

## 2024-01-06 NOTE — PROGRESS NOTES
Jhonny Eastman is a 43 y.o. male who is currently ordered Vancomycin IV with management by the Pharmacy Consult service.  Relevant clinical data and objective / subjective history reviewed.  Vancomycin Assessment:  Indication and Goal AUC/Trough: Bone/joint infection (goal -600, trough >10)  Micro:     Renal Function:  SCr: 0.81 mg/dL  CrCl: 159.9 mL/min  Renal replacement: not on dialysis  Days of Therapy: 2  Current Dose: 2000 mg IV loading dose  Vancomycin Plan:  New Dosinmg IV q12h  Estimated AUC: 462 mcg*hr/mL  Estimated Trough: 14.8 mcg/mL  Next Level:  with AM labs  Renal Function Monitoring: Daily BMP and UOP  Pharmacy will continue to follow closely for s/sx of nephrotoxicity, infusion reactions and appropriateness of therapy.  BMP and CBC will be ordered per protocol. We will continue to follow the patient’s culture results and clinical progress daily.    Mehdi Garrett, Pharmacist

## 2024-01-06 NOTE — PROGRESS NOTES
"[facility]  Progress Note  Name: Jhonny Eastman I  MRN: [id number]  Unit/Bed#: E4 -02 I Date of Admission: 1/4/2024   Date of Service: 1/6/2024 I Hospital Day: 1    Assessment/Plan   * Otomastoiditis, acute, right  Assessment & Plan  Patient with no significant PMHx presented to the ED with complaint of three days of bilateral ear \"fullness,\" right ear pain with clear/yellow drainage, dysequilibrium, and right-sided facial weakness  Continue Unasyn #2  Will likely discharge with Augmentin to complete a course of antibiotic  Tolerating antibiotic therapy with no adverse effects  Await culture results    Periodontal disease  Assessment & Plan  CT facial bones demonstrating \"Dental caries and periodontal disease, most prominent involving the right maxillary molar and left posterior most mandibular molar. Correlation with the patient's symptoms recommended. \"  Outpatient follow-up  No evidence of abscess to necessitate transfer to [facility]    Cranial nerve VII palsy  Assessment & Plan  Patient reports that shortly after his right ear symptoms developed, he noted he had a right-sided facial droop   Continue steroids with plan for a long taper at discharge  Lyme titer is negative  HSV pending but low clinical suspicion for [place] patient has no visible lesions, if positive would consider adding valacyclovir  Continue omeprazole  Continue emollients for patient's eye as well as artificial tears  Patient has been instructed on eye taping.                 Hospital Course:     43-year-old male patient presenting with right facial palsy, history of periodontal disease.  Found to have sinusitis as well as osteo mastoiditis.  Remains on IV antibiotics as well as Decadron.  Symptoms are improving    Assessment:      Principal Problem:    Otomastoiditis, acute, right  Active Problems:    Cranial nerve VII palsy    Periodontal disease      Plan:    Likely home tomorrow with oral antibiotics, oral " steroids and planned outpatient ENT follow-up       VTE Pharmacologic Prophylaxis:   Pharmacologic: Enoxaparin (Lovenox)  Mechanical VTE Prophylaxis in Place: Yes    AM-PAC Basic Mobility:  Basic Mobility Inpatient Raw Score: 24    JH-HLM Achieved: 7: Walk 25 feet or more  JH-HLM Goal: 8: Walk 250 feet or more    HLM Goal listed above. Continue with multidisciplinary rounding and encourage appropriate mobility to improve upon HLM goals.         Patient Centered Rounds: Case discussed and reviewed with nursing    Discussions with Specialists or Other Care Team Provider: Case management    Education and Discussions with Family / Patient: Patient declines update, will update his family    Time Spent for Care: 80 minutes.  More than 50% of total time spent on counseling and coordination of care as described above.    Current Length of Stay: 1 day(s)    Current Patient Status: Inpatient   Certification Statement: The patient will continue to require additional inpatient hospital stay due to IV antibiotics, steroids, supportive care    Discharge Plan / Estimated Discharge Date: Possibly tomorrow if culture data is returned    Code Status: Level 1 - Full Code      Subjective:   Seen and examined, patient reports ability to close his eyes.  He is able to tolerate steroid treatment.  He has no shortness of breath or difficulty breathing.  Denies any other acute complaints    A complete and comprehensive 14 point organ system review has been performed and all other systems are negative other than stated above.    Objective:     Vitals:   Temp (24hrs), Av.5 °F (36.4 °C), Min:96.6 °F (35.9 °C), Max:98.9 °F (37.2 °C)    Temp:  [96.6 °F (35.9 °C)-98.9 °F (37.2 °C)] 98.9 °F (37.2 °C)  HR:  [73-84] 73  Resp:  [18] 18  BP: (106-127)/(55-77) 106/55  SpO2:  [94 %-96 %] 96 %  Body mass index is 35.67 kg/m².     Input and Output Summary (last 24 hours):     No intake or output data in the 24 hours ending 24 1317    Physical  Exam:     General: well appearing, no acute distress  HEENT: atraumatic, PERRLA, moist mucosa, normal pharynx, normal tonsils and adenoids, normal tongue, no fluid in sinuses  Neck: Trachea midline, no carotid bruit, no masses  Respiratory: normal chest wall expansion, CTA B, no r/r/w, no rubs  Cardiovascular: RRR, no m/r/g, Normal S1 and S2  Abdomen: Soft, non-tender, non-distended, normal bowel sounds in all quadrants, no hepatosplenomegaly, no tympany  Rectal: deferred  Musculoskeletal: normal ROM in upper and lower extremities  Integumentary: warm, dry, and pink, with no rash, purpura, or petechia  Heme/Lymph: no lymphadenopathy, no bruises  Neurological: Cranial VII nerve palsy  Psychiatric: cooperative with normal mood, affect, and cognition      Additional Data:     Labs:    Results from last 7 days   Lab Units 01/05/24  0517   WBC Thousand/uL 11.37*   HEMOGLOBIN g/dL 14.1   HEMATOCRIT % 42.5   PLATELETS Thousands/uL 311   NEUTROS PCT % 57   LYMPHS PCT % 29   MONOS PCT % 11   EOS PCT % 1     Results from last 7 days   Lab Units 01/06/24  0535 01/05/24  0517 01/05/24  0142   POTASSIUM mmol/L 4.1   < > 3.8   CHLORIDE mmol/L 107   < > 109*   CO2 mmol/L 24   < > 24   BUN mg/dL 13   < > 13   CREATININE mg/dL 0.81   < > 1.01   CALCIUM mg/dL 9.1   < > 9.2   ALK PHOS U/L  --   --  61   ALT U/L  --   --  30   AST U/L  --   --  15    < > = values in this interval not displayed.     Results from last 7 days   Lab Units 01/05/24  0142   INR  1.03       * I Have Reviewed All Lab Data Listed Above.  * Additional Pertinent Lab Tests Reviewed: All Labs For Current Hospital Admission Reviewed    Imaging:    Imaging Reports Reviewed Today Include: No new imaging  Imaging Personally Reviewed by Myself Includes: No new imaging    Recent Cultures (last 7 days):     Results from last 7 days   Lab Units 01/05/24  1001 01/05/24  0147 01/05/24  0142   BLOOD CULTURE   --  No Growth at 24 hrs. No Growth at 24 hrs.   GRAM STAIN RESULT   2+ Polys  No bacteria seen  --   --    WOUND CULTURE  No growth  --   --        Last 24 Hours Medication List:   Current Facility-Administered Medications   Medication Dose Route Frequency Provider Last Rate    acetaminophen  650 mg Oral Q6H PRN Antoine Kilgore PA-C      aluminum-magnesium hydroxide-simethicone  30 mL Oral Q6H PRN Antoine Kilgore PA-C      ampicillin-sulbactam  3 g Intravenous Q6H Antoine Kilgore PA-C 3 g (01/06/24 1109)    artificial tear   Both Eyes TID Jonas Mott, DO      ciprofloxacin-dexamethasone  4 drop Right Ear BID Adeel Muniz, DO      dexamethasone  10 mg Intravenous Q8H TUSHAR Jonas Mott DO      enoxaparin  40 mg Subcutaneous Daily Antoine Kilgore PA-C      erythromycin  0.5 inch Right Eye HS Jonas Mott DO      glycerin-hypromellose-  2 drop Right Eye Q4H PRN Adeel Muniz, DO      meclizine  25 mg Oral Q8H PRN Antoine Kilgore PA-C      ondansetron  4 mg Intravenous Q6H PRN Antoine Kilgore PA-C      pantoprazole  40 mg Oral Daily Before Breakfast Jonas Mott DO      vancomycin  1,250 mg Intravenous Q12H Antoine Kilgore PA-C 1,250 mg (01/06/24 0852)       AM-PAC Basic Mobility:  Basic Mobility Inpatient Raw Score: 24    JH-HLM Achieved: 7: Walk 25 feet or more  JH-HLM Goal: 8: Walk 250 feet or more    HLM Goal listed above. Continue with multidisciplinary rounding and encourage appropriate mobility to improve upon HLM goals.     Today, Patient Was Seen By: Jonas Mott DO    ** Please Note: This note was completed in part utilizing [employer] One [employer] software dictation.  Grammatical errors, random word insertions, spelling mistakes, and incomplete sentences may be an occasional consequence of this system secondary to software limitations, ambient noise, and hardware issues.  If you have any questions or concerns about the content, text, or information contained within the body of this dictation, please contact the  provider for clarification. **

## 2024-01-06 NOTE — PLAN OF CARE
Problem: PAIN - ADULT  Goal: Verbalizes/displays adequate comfort level or baseline comfort level  Description: Interventions:  - Encourage patient to monitor pain and request assistance  - Assess pain using appropriate pain scale  - Administer analgesics based on type and severity of pain and evaluate response  - Implement non-pharmacological measures as appropriate and evaluate response  - Consider cultural and social influences on pain and pain management  - Notify physician/advanced practitioner if interventions unsuccessful or patient reports new pain  Outcome: Progressing     Problem: INFECTION - ADULT  Goal: Absence or prevention of progression during hospitalization  Description: INTERVENTIONS:  - Assess and monitor for signs and symptoms of infection  - Monitor lab/diagnostic results  - Monitor all insertion sites, i.e. indwelling lines, tubes, and drains  - Monitor endotracheal if appropriate and nasal secretions for changes in amount and color  - [place] appropriate cooling/warming therapies per order  - Administer medications as ordered  - Instruct and encourage patient and family to use good hand hygiene technique  - Identify and instruct in appropriate isolation precautions for identified infection/condition  Outcome: Progressing  Goal: Absence of fever/infection during neutropenic period  Description: INTERVENTIONS:  - Monitor WBC    Outcome: Progressing     Problem: SAFETY ADULT  Goal: Patient will remain free of falls  Description: INTERVENTIONS:  - Educate patient/family on patient safety including physical limitations  - Instruct patient to call for assistance with activity   - Consult OT/PT to assist with strengthening/mobility   - Keep Call bell within reach  - Keep bed low and locked with side rails adjusted as appropriate  - Keep care items and personal belongings within reach  - Initiate and maintain comfort rounds  - Make Fall Risk Sign visible to staff  - Offer Toileting every  Hours,  in advance of need  - Initiate/Maintain alarm  - Obtain necessary fall risk management equipment: ply yellow socks and bracelet for high fall risk patients  - Consider moving patient to room near nurses station  Outcome: Progressing  Goal: Maintain or return to baseline ADL function  Description: INTERVENTIONS:  -  Assess patient's ability to carry out ADLs; assess patient's baseline for ADL function and identify physical deficits which impact ability to perform ADLs (bathing, care of mouth/teeth, toileting, grooming, dressing, etc.)  - Assess/evaluate cause of self-care deficits   - Assess range of motion  - Assess patient's mobility; develop plan if impaired  - Assess patient's need for assistive devices and provide as appropriate  - Encourage maximum independence but intervene and supervise when necessary  - Involve family in performance of ADLs  - Assess for home care needs following discharge   - Consider OT consult to assist with ADL evaluation and planning for discharge  - Provide patient education as appropriate  Outcome: Progressing  Goal: Maintains/Returns to pre admission functional level  Description: INTERVENTIONS:  - Perform AM-PAC 6 Click Basic Mobility/ Daily Activity assessment daily.  - Set and communicate daily mobility goal to care team and patient/family/caregiver.   - Collaborate with rehabilitation services on mobility goals if consulted  - Perform Range of Motiontimes a day.  - Reposition patient every  hours.  - Dangle patient  times a day  - Stand patient  times a day  - Ambulate patient  times a day  - Out of bed to chair  times a day   - Out of bed for meals  times a day  - Out of bed for toileting  - Record patient progress and toleration of activity level   Outcome: Progressing     Problem: DISCHARGE PLANNING  Goal: Discharge to home or other facility with appropriate resources  Description: INTERVENTIONS:  - Identify barriers to discharge w/patient and caregiver  - Arrange for needed  discharge resources and transportation as appropriate  - Identify discharge learning needs (meds, wound care, etc.)  - Arrange for interpretive services to assist at discharge as needed  - Refer to Case Management Department for coordinating discharge planning if the patient needs post-hospital services based on physician/advanced practitioner order or complex needs related to functional status, cognitive ability, or social support system  Outcome: Progressing     Problem: Knowledge Deficit  Goal: Patient/family/caregiver demonstrates understanding of disease process, treatment plan, medications, and discharge instructions  Description: Complete learning assessment and assess knowledge base.  Interventions:  - Provide teaching at level of understanding  - Provide teaching via preferred learning methods  Outcome: Progressing

## 2024-01-06 NOTE — UTILIZATION REVIEW
Initial Clinical Review    Admission: Date/Time/Statement:   Admission Orders (From admission, onward)       Ordered        01/05/24 0427  INPATIENT ADMISSION  Once                          Orders Placed This Encounter   Procedures    INPATIENT ADMISSION     Standing Status:   Standing     Number of Occurrences:   1     Order Specific Question:   Level of Care     Answer:   Med Surg [16]     Order Specific Question:   Estimated length of stay     Answer:   More than 2 Midnights     Order Specific Question:   Certification     Answer:   I certify that inpatient services are medically necessary for this patient for a duration of greater than two midnights. See H&P and MD Progress Notes for additional information about the patient's course of treatment.     ED Arrival Information       Expected   -    Arrival   1/4/2024 22:32    Acuity   Urgent              Means of arrival   Walk-In    Escorted by   Self    Service   Hospitalist    Admission type   Emergency              Arrival complaint   ear pain, lip swelling             Chief Complaint   Patient presents with    Earache     Right sided ear pain, causing pain and dizziness and swelling around mouth       Initial Presentation: 43 y.o. male presents to ED from home   with flu like symptoms for past week with fevers, chills, congestion, myalgials, productive cough and rhinorrhea.   Was visiting  in [place] then, felt better on  returning home.  Symptoms returned  3 days  prior to admission, progressed  to bilateral ear fullness, followed by sharp pain in right ear with tinnitus and drainage.  Had decreased hearing, dizziness and disequilibrium.   No dizziness at rest, but with movement  becomes dizzy.  Developed  right sided facial droop 2 days  prior to admission,  unable to lift right eyebrow, close right eye or raise the right side of his mouth.   CT  head unremarkable. CT facial bones/orbits shows suspicion for otomastoiditis.  Not meeting sepsis criteria.   Admit  Ip  with  Otomastoiditis,  right ear and plan is  ENT/ID consults,  monitor labs, blood cultures, YOUSIF, neuro checks, pain control  And observe.      ENT consult  Plan high dose steroid taper for  12 - 15 days, start decadron.  Wait  ear  cultures.     Continue to monitor facial function.      Neuro consult  No  neuro testing indicated.     Continue steroids.    ED Triage Vitals   Temperature Pulse Respirations Blood Pressure SpO2   01/04/24 2244 01/04/24 2244 01/04/24 2244 01/04/24 2253 01/04/24 2244   98 °F (36.7 °C) 88 18 122/73 98 %      Temp Source Heart Rate Source Patient Position - Orthostatic VS BP Location FiO2 (%)   01/04/24 2244 01/04/24 2244 01/04/24 2244 01/04/24 2244 --   Oral Monitor Sitting Right arm       Pain Score       01/04/24 2244       10 - Worst Possible Pain          Wt Readings from Last 1 Encounters:   01/05/24 119 kg (263 lb)     Additional Vital Signs:   8.9 °F (37.2 °C) 73 18 106/55 75 96 % None (Room air) Lying    01/06/24 0004 97.5 °F (36.4 °C) 84 18 127/70 82 94 % -- Lying   01/05/24 1535 96.6 °F (35.9 °C) Abnormal  84 18 116/76 -- -- -- Lying   01/05/24 1437 97 °F (36.1 °C) Abnormal  79 18 127/77 -- 96 % None (Room air) Lying   01/05/24 1420 -- -- -- -- -- -- None (Room air) --   01/05/24 1211 -- 74 -- 121/58 -- 95 % None (Room air) Lying   01/05/24 0937 -- 74 -- 141/66 -- 97 % None (Room air) Lying   01/05/24 0507 -- 82 18 118/65 85 98 % None (Room air) Lying   01/05/24 0233 -- 85 18 113/67 -- 96 % None (Room air) Lying   01/04/24 2253 -- -- -- 122/73 -- -- -- Sitting   01/04/24 2247 -- -- -- --  -- -- -- --   BP: unable to ubtain at 01/04/24 2247   01/04/24 2244 98 °F (36.7 °C) 88 18 -- -- 98 % None (Room air) Sitting     Pertinent Labs/Diagnostic Test Results:   CT head w contrast   Final Result by Wilton Campos DO (01/05 0348)      CT orbits/temporal bones/skull base w contrast   Final Result by Wilton Campos DO (01/05 0348)      CT facial bones with contrast    Final Result by Wilton Campos DO (01/05 0348)        Results from last 7 days   Lab Units 01/05/24  0142   SARS-COV-2  Negative     Results from last 7 days   Lab Units 01/05/24  0517 01/05/24  0142   WBC Thousand/uL 11.37* 13.16*   HEMOGLOBIN g/dL 14.1 14.5   HEMATOCRIT % 42.5 43.8   PLATELETS Thousands/uL 311 334   NEUTROS ABS Thousands/µL 6.58  --    BANDS PCT %  --  11*         Results from last 7 days   Lab Units 01/06/24  0535 01/05/24  0517 01/05/24  0142   SODIUM mmol/L 139 140 141   POTASSIUM mmol/L 4.1 3.8 3.8   CHLORIDE mmol/L 107 109* 109*   CO2 mmol/L 24 24 24   ANION GAP mmol/L 8 7 8   BUN mg/dL 13 12 13   CREATININE mg/dL 0.81 0.88 1.01   EGFR ml/min/1.73sq m 110 106 91   CALCIUM mg/dL 9.1 8.7 9.2   MAGNESIUM mg/dL  --  2.1  --      Results from last 7 days   Lab Units 01/05/24  0142   AST U/L 15   ALT U/L 30   ALK PHOS U/L 61   TOTAL PROTEIN g/dL 7.8   ALBUMIN g/dL 4.3   TOTAL BILIRUBIN mg/dL 0.47         Results from last 7 days   Lab Units 01/06/24  0535 01/05/24  0517 01/05/24  0142   GLUCOSE RANDOM mg/dL 188* 139 114               Results from last 7 days   Lab Units 01/05/24  0142   PROTIME seconds 13.7   INR  1.03   PTT seconds 33         Results from last 7 days   Lab Units 01/05/24  0142   PROCALCITONIN ng/ml 0.05     Results from last 7 days   Lab Units 01/05/24  0142   LACTIC ACID mmol/L 0.9               Results from last 7 days   Lab Units 01/05/24  0142   INFLUENZA A PCR  Negative   INFLUENZA B PCR  Negative   RSV PCR  Negative                             Results from last 7 days   Lab Units 01/05/24  1001 01/05/24  0147 01/05/24  0142   BLOOD CULTURE   --  No Growth at 24 hrs. No Growth at 24 hrs.   GRAM STAIN RESULT  2+ Polys  No bacteria seen  --   --                    ED Treatment:   Medication Administration from 01/04/2024 2232 to 01/05/2024 1419         Date/Time Order Dose Route Action Comments     01/05/2024 0158 EST sodium chloride (PF) 0.9 % injection 3 mL 3 mL  Intravenous Given --     01/05/2024 0234 EST ampicillin-sulbactam (UNASYN) 3 g in sodium chloride 0.9 % 100 mL IVPB 0 g Intravenous Stopped --     01/05/2024 0158 EST ampicillin-sulbactam (UNASYN) 3 g in sodium chloride 0.9 % 100 mL IVPB 3 g Intravenous New Bag --     01/05/2024 0332 EST sodium chloride 0.9 % bolus 1,000 mL 0 mL Intravenous Stopped --     01/05/2024 0146 EST sodium chloride 0.9 % bolus 1,000 mL 1,000 mL Intravenous New Bag --     01/05/2024 0226 EST iohexol (OMNIPAQUE) 350 MG/ML injection (MULTI-DOSE) 100 mL 100 mL Intravenous Given --     01/05/2024 0502 EST vancomycin (VANCOCIN) 2,000 mg in sodium chloride 0.9 % 500 mL IVPB 2,000 mg Intravenous Not Given --     01/05/2024 0500 EST meclizine (ANTIVERT) tablet 25 mg 25 mg Oral Given --     01/05/2024 0511 EST acetaminophen (TYLENOL) tablet 650 mg 650 mg Oral Given --     01/05/2024 0844 EST enoxaparin (LOVENOX) subcutaneous injection 40 mg 40 mg Subcutaneous Given --     01/05/2024 1049 EST ampicillin-sulbactam (UNASYN) 3 g in sodium chloride 0.9 % 100 mL IVPB 0 g Intravenous Stopped --     01/05/2024 0845 EST ampicillin-sulbactam (UNASYN) 3 g in sodium chloride 0.9 % 100 mL IVPB 3 g Intravenous New Bag --     01/05/2024 0840 EST vancomycin (VANCOCIN) 2,000 mg in sodium chloride 0.9 % 500 mL IVPB 0 mg Intravenous Stopped --     01/05/2024 0513 EST vancomycin (VANCOCIN) 2,000 mg in sodium chloride 0.9 % 500 mL IVPB 2,000 mg Intravenous New Bag --     01/05/2024 0843 EST predniSONE tablet 60 mg 60 mg Oral Given --     01/05/2024 1037 EST ciprofloxacin-dexamethasone (CIPRODEX) 0.3-0.1 % otic suspension 4 drop 4 drop Right Ear Given --            Admitting Diagnosis: Earache [H92.09]  Mastoiditis [H70.90]  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 male  Admission Orders:  Scheduled Medications:  ampicillin-sulbactam, 3 g, Intravenous, Q6H  artificial tear, , Both Eyes, TID  ciprofloxacin-dexamethasone, 4  drop, Right Ear, BID  dexamethasone, 10 mg, Intravenous, Q8H TUSHAR  enoxaparin, 40 mg, Subcutaneous, Daily  erythromycin, 0.5 inch, Right Eye, HS  pantoprazole, 40 mg, Oral, Daily Before Breakfast  vancomycin, 1,250 mg, Intravenous, Q12H      Continuous IV Infusions:     PRN Meds:  acetaminophen, 650 mg, Oral, Q6H PRN  aluminum-magnesium hydroxide-simethicone, 30 mL, Oral, Q6H PRN  glycerin-hypromellose-, 2 drop, Right Eye, Q4H PRN  meclizine, 25 mg, Oral, Q8H PRN  ondansetron, 4 mg, Intravenous, Q6H PRN        IP CONSULT TO NEUROLOGY  IP CONSULT TO ENT  IP CONSULT TO PHARMACY    Network Utilization Review Department  ATTENTION: Please call with any questions or concerns to [phone] and carefully listen to the prompts so that you are directed to the right person. All voicemails are confidential.   For Discharge needs, contact Care Management DC Support Team at [phone] opt. 2  Send all requests for admission clinical reviews, approved or denied determinations and any other requests to dedicated fax number below belonging to the campus where the patient is receiving treatment. List of dedicated fax numbers for the Facilities:  FACILITY NAME UR FAX NUMBER   ADMISSION DENIALS (Administrative/Medical Necessity) [phone]   DISCHARGE SUPPORT TEAM (NETWORK) [phone]   PARENT CHILD HEALTH (Maternity/NICU/Pediatrics) [phone]   [facility] [phone]   [facility] [phone]   [facility] [phone]   [facility] [phone]   [facility] [phone]   [facility] [phone]   [facility] [phone]   [facility] [phone]   [facility] [phone]   [facility] [phone]   [facility]  [phone]

## 2024-01-06 NOTE — PLAN OF CARE
Problem: PAIN - ADULT  Goal: Verbalizes/displays adequate comfort level or baseline comfort level  Description: Interventions:  - Encourage patient to monitor pain and request assistance  - Assess pain using appropriate pain scale  - Administer analgesics based on type and severity of pain and evaluate response  - Implement non-pharmacological measures as appropriate and evaluate response  - Consider cultural and social influences on pain and pain management  - Notify physician/advanced practitioner if interventions unsuccessful or patient reports new pain  Outcome: Progressing     Problem: INFECTION - ADULT  Goal: Absence or prevention of progression during hospitalization  Description: INTERVENTIONS:  - Assess and monitor for signs and symptoms of infection  - Monitor lab/diagnostic results  - Monitor all insertion sites, i.e. indwelling lines, tubes, and drains  - [place] appropriate cooling/warming therapies per order  - Administer medications as ordered  - Instruct and encourage patient and family to use good hand hygiene technique  Outcome: Progressing     Problem: SAFETY ADULT  Goal: Patient will remain free of falls  Description: INTERVENTIONS:  - Educate patient/family on patient safety including physical limitations  - Instruct patient to call for assistance with activity   - Consult OT/PT to assist with strengthening/mobility   - Keep Call bell within reach  - Keep bed low and locked with side rails adjusted as appropriate  - Keep care items and personal belongings within reach  - Initiate and maintain comfort rounds  Outcome: Progressing  Goal: Maintain or return to baseline ADL function  Description: INTERVENTIONS:  -  Assess patient's ability to carry out ADLs; assess patient's baseline for ADL function and identify physical deficits which impact ability to perform ADLs (bathing, care of mouth/teeth, toileting, grooming, dressing, etc.)  - Assess/evaluate cause of self-care deficits   - Assess  range of motion  - Assess patient's mobility; develop plan if impaired  - Assess patient's need for assistive devices and provide as appropriate  - Encourage maximum independence but intervene and supervise when necessary  - Involve family in performance of ADLs  - Assess for home care needs following discharge   - Consider OT consult to assist with ADL evaluation and planning for discharge  - Provide patient education as appropriate  Outcome: Progressing  Goal: Maintains/Returns to pre admission functional level  Description: INTERVENTIONS:  - Perform AM-PAC 6 Click Basic Mobility/ Daily Activity assessment daily.  - Set and communicate daily mobility goal to care team and patient/family/caregiver.   - Collaborate with rehabilitation services on mobility goals if consulted  - Ambulate patient 3 times a day  - Out of bed to chair 3 times a day   - Out of bed for meals 3 times a day  - Out of bed for toileting  - Record patient progress and toleration of activity level   Outcome: Progressing     Problem: DISCHARGE PLANNING  Goal: Discharge to home or other facility with appropriate resources  Description: INTERVENTIONS:  - Identify barriers to discharge w/patient and caregiver  - Arrange for needed discharge resources and transportation as appropriate  - Identify discharge learning needs (meds, wound care, etc.)  - Refer to Case Management Department for coordinating discharge planning if the patient needs post-hospital services based on physician/advanced practitioner order or complex needs related to functional status, cognitive ability, or social support system  Outcome: Progressing     Problem: Knowledge Deficit  Goal: Patient/family/caregiver demonstrates understanding of disease process, treatment plan, medications, and discharge instructions  Description: Complete learning assessment and assess knowledge base.  Interventions:  - Provide teaching at level of understanding  - Provide teaching via preferred  learning methods  Outcome: Progressing

## 2024-01-06 NOTE — ASSESSMENT & PLAN NOTE
Patient reports that shortly after his right ear symptoms developed, he noted he had a right-sided facial droop   Continue steroids with plan for a long taper at discharge  Lyme titer is negative  HSV pending but low clinical suspicion for Ramses Nicholson patient has no visible lesions, if positive would consider adding valacyclovir  Continue omeprazole  Continue emollients for patient's eye as well as artificial tears  Patient has been instructed on eye taping.

## 2024-01-06 NOTE — ASSESSMENT & PLAN NOTE
"Patient with no significant PMHx presented to the ED with complaint of three days of bilateral ear \"fullness,\" right ear pain with clear/yellow drainage, dysequilibrium, and right-sided facial weakness  Continue Unasyn #2  Will likely discharge with Augmentin to complete a course of antibiotic  Tolerating antibiotic therapy with no adverse effects  Await culture results  "

## 2024-01-06 NOTE — PLAN OF CARE
Problem: PAIN - ADULT  Goal: Verbalizes/displays adequate comfort level or baseline comfort level  Description: Interventions:  - Encourage patient to monitor pain and request assistance  - Assess pain using appropriate pain scale  - Administer analgesics based on type and severity of pain and evaluate response  - Implement non-pharmacological measures as appropriate and evaluate response  - Consider cultural and social influences on pain and pain management  - Notify physician/advanced practitioner if interventions unsuccessful or patient reports new pain  Outcome: Progressing     Problem: INFECTION - ADULT  Goal: Absence or prevention of progression during hospitalization  Description: INTERVENTIONS:  - Assess and monitor for signs and symptoms of infection  - Monitor lab/diagnostic results  - Monitor all insertion sites, i.e. indwelling lines, tubes, and drains  - Monitor endotracheal if appropriate and nasal secretions for changes in amount and color  - [place] appropriate cooling/warming therapies per order  - Administer medications as ordered  - Instruct and encourage patient and family to use good hand hygiene technique  - Identify and instruct in appropriate isolation precautions for identified infection/condition  Outcome: Progressing     Problem: Knowledge Deficit  Goal: Patient/family/caregiver demonstrates understanding of disease process, treatment plan, medications, and discharge instructions  Description: Complete learning assessment and assess knowledge base.  Interventions:  - Provide teaching at level of understanding  - Provide teaching via preferred learning methods  Outcome: Progressing

## 2024-01-07 VITALS
HEIGHT: 72 IN | OXYGEN SATURATION: 95 % | WEIGHT: 263 LBS | HEART RATE: 92 BPM | RESPIRATION RATE: 18 BRPM | SYSTOLIC BLOOD PRESSURE: 123 MMHG | TEMPERATURE: 97.4 F | DIASTOLIC BLOOD PRESSURE: 62 MMHG | BODY MASS INDEX: 35.62 KG/M2

## 2024-01-07 LAB
ANION GAP SERPL CALCULATED.3IONS-SCNC: 7 MMOL/L
BUN SERPL-MCNC: 19 MG/DL (ref 5–25)
CALCIUM SERPL-MCNC: 9.5 MG/DL (ref 8.4–10.2)
CHLORIDE SERPL-SCNC: 104 MMOL/L (ref 96–108)
CO2 SERPL-SCNC: 23 MMOL/L (ref 21–32)
CREAT SERPL-MCNC: 0.95 MG/DL (ref 0.6–1.3)
GFR SERPL CREATININE-BSD FRML MDRD: 99 ML/MIN/1.73SQ M
GLUCOSE SERPL-MCNC: 275 MG/DL (ref 65–140)
POTASSIUM SERPL-SCNC: 4.6 MMOL/L (ref 3.5–5.3)
SODIUM SERPL-SCNC: 134 MMOL/L (ref 135–147)
VANCOMYCIN SERPL-MCNC: 5.4 UG/ML (ref 10–20)

## 2024-01-07 PROCEDURE — 80048 BASIC METABOLIC PNL TOTAL CA: CPT | Performed by: INTERNAL MEDICINE

## 2024-01-07 PROCEDURE — 80202 ASSAY OF VANCOMYCIN: CPT

## 2024-01-07 PROCEDURE — 99239 HOSP IP/OBS DSCHRG MGMT >30: CPT | Performed by: INTERNAL MEDICINE

## 2024-01-07 RX ORDER — VALACYCLOVIR HYDROCHLORIDE 500 MG/1
1000 TABLET, FILM COATED ORAL EVERY 8 HOURS SCHEDULED
Status: DISCONTINUED | OUTPATIENT
Start: 2024-01-07 | End: 2024-01-07 | Stop reason: HOSPADM

## 2024-01-07 RX ORDER — PREDNISONE 20 MG/1
TABLET ORAL
Qty: 60 TABLET | Refills: 0 | Status: SHIPPED | OUTPATIENT
Start: 2024-01-07

## 2024-01-07 RX ORDER — AMOXICILLIN AND CLAVULANATE POTASSIUM 875; 125 MG/1; MG/1
1 TABLET, FILM COATED ORAL EVERY 12 HOURS SCHEDULED
Status: DISCONTINUED | OUTPATIENT
Start: 2024-01-07 | End: 2024-01-07 | Stop reason: HOSPADM

## 2024-01-07 RX ORDER — VALACYCLOVIR HYDROCHLORIDE 1 G/1
1000 TABLET, FILM COATED ORAL EVERY 8 HOURS SCHEDULED
Qty: 21 TABLET | Refills: 0 | Status: SHIPPED | OUTPATIENT
Start: 2024-01-07 | End: 2024-01-14

## 2024-01-07 RX ORDER — MINERAL OIL AND PETROLATUM 150; 830 MG/G; MG/G
OINTMENT OPHTHALMIC 3 TIMES DAILY
Qty: 3.5 G | Refills: 0 | Status: SHIPPED | OUTPATIENT
Start: 2024-01-07 | End: 2024-02-06

## 2024-01-07 RX ORDER — CIPROFLOXACIN AND DEXAMETHASONE 3; 1 MG/ML; MG/ML
4 SUSPENSION/ DROPS AURICULAR (OTIC) 2 TIMES DAILY
Qty: 7.5 ML | Refills: 0 | Status: SHIPPED | OUTPATIENT
Start: 2024-01-07

## 2024-01-07 RX ORDER — AMOXICILLIN AND CLAVULANATE POTASSIUM 875; 125 MG/1; MG/1
1 TABLET, FILM COATED ORAL EVERY 12 HOURS SCHEDULED
Qty: 28 TABLET | Refills: 0 | Status: SHIPPED | OUTPATIENT
Start: 2024-01-07 | End: 2024-01-21

## 2024-01-07 RX ORDER — PANTOPRAZOLE SODIUM 40 MG/1
40 TABLET, DELAYED RELEASE ORAL
Qty: 30 TABLET | Refills: 0 | Status: SHIPPED | OUTPATIENT
Start: 2024-01-08 | End: 2024-02-07

## 2024-01-07 RX ADMIN — GLYCERIN 2 DROP: .002; .002; .01 SOLUTION/ DROPS OPHTHALMIC at 10:24

## 2024-01-07 RX ADMIN — DEXAMETHASONE SODIUM PHOSPHATE 10 MG: 10 INJECTION INTRAMUSCULAR; INTRAVENOUS at 06:54

## 2024-01-07 RX ADMIN — PANTOPRAZOLE SODIUM 40 MG: 40 TABLET, DELAYED RELEASE ORAL at 06:54

## 2024-01-07 RX ADMIN — AMOXICILLIN AND CLAVULANATE POTASSIUM 1 TABLET: 875; 125 TABLET, FILM COATED ORAL at 10:24

## 2024-01-07 RX ADMIN — GLYCERIN 2 DROP: .002; .002; .01 SOLUTION/ DROPS OPHTHALMIC at 02:17

## 2024-01-07 RX ADMIN — MINERAL OIL AND PETROLATUM: 319; 577 GEL OPHTHALMIC at 10:27

## 2024-01-07 RX ADMIN — SODIUM CHLORIDE 3 G: 9 INJECTION, SOLUTION INTRAVENOUS at 04:37

## 2024-01-07 RX ADMIN — CIPROFLOXACIN AND DEXAMETHASONE 4 DROP: 3; 1 SUSPENSION/ DROPS AURICULAR (OTIC) at 10:24

## 2024-01-07 RX ADMIN — VALACYCLOVIR HYDROCHLORIDE 1000 MG: 500 TABLET, FILM COATED ORAL at 11:08

## 2024-01-07 RX ADMIN — ACETAMINOPHEN 325MG 650 MG: 325 TABLET ORAL at 02:15

## 2024-01-07 NOTE — ASSESSMENT & PLAN NOTE
Patient reports that shortly after his right ear symptoms developed, he noted he had a right-sided facial droop   Continue steroids with plan for a long taper at discharge  Lyme titer is negative  HSV pending but low clinical suspicion for Ramses Nicholson patient has no visible lesions  Start of Valtrex at discharge given some benefit for patients with Bell's Palsy  Continue omeprazole or Protonix at discharge  Continue emollients for patient's eye as well as artificial tears  Patient has been instructed on eye taping.

## 2024-01-07 NOTE — ASSESSMENT & PLAN NOTE
"Patient with no significant PMHx presented to the ED with complaint of three days of bilateral ear \"fullness,\" right ear pain with clear/yellow drainage, dysequilibrium, and right-sided facial weakness  Continue Unasyn #2- change to Augmentin at discharge  Tolerating antibiotic therapy with no adverse effects  Outpatient ENT follow up  "

## 2024-01-07 NOTE — PLAN OF CARE
Problem: PAIN - ADULT  Goal: Verbalizes/displays adequate comfort level or baseline comfort level  Description: Interventions:  - Encourage patient to monitor pain and request assistance  - Assess pain using appropriate pain scale  - Administer analgesics based on type and severity of pain and evaluate response  - Implement non-pharmacological measures as appropriate and evaluate response  - Consider cultural and social influences on pain and pain management  - Notify physician/advanced practitioner if interventions unsuccessful or patient reports new pain  1/6/2024 2006 by Pratima Moses RN  Outcome: Progressing  1/6/2024 1845 by Pratima Moses RN  Outcome: Progressing     Problem: INFECTION - ADULT  Goal: Absence or prevention of progression during hospitalization  Description: INTERVENTIONS:  - Assess and monitor for signs and symptoms of infection  - Monitor lab/diagnostic results  - Monitor all insertion sites, i.e. indwelling lines, tubes, and drains  - [place] appropriate cooling/warming therapies per order  - Administer medications as ordered  - Instruct and encourage patient and family to use good hand hygiene technique  1/6/2024 2006 by Pratima Moses RN  Outcome: Progressing  1/6/2024 1845 by Pratima Moses RN  Outcome: Progressing     Problem: SAFETY ADULT  Goal: Patient will remain free of falls  Description: INTERVENTIONS:  - Educate patient/family on patient safety including physical limitations  - Instruct patient to call for assistance with activity   - Consult OT/PT to assist with strengthening/mobility   - Keep Call bell within reach  - Keep bed low and locked with side rails adjusted as appropriate  - Keep care items and personal belongings within reach  - Initiate and maintain comfort rounds  1/6/2024 2006 by Pratima Moses RN  Outcome: Progressing  1/6/2024 1845 by Pratima Moses RN  Outcome: Progressing  Goal: Maintain or return to baseline ADL function  Description: INTERVENTIONS:  -  Assess  patient's ability to carry out ADLs; assess patient's baseline for ADL function and identify physical deficits which impact ability to perform ADLs (bathing, care of mouth/teeth, toileting, grooming, dressing, etc.)  - Assess/evaluate cause of self-care deficits   - Assess range of motion  - Assess patient's mobility; develop plan if impaired  - Assess patient's need for assistive devices and provide as appropriate  - Encourage maximum independence but intervene and supervise when necessary  - Involve family in performance of ADLs  - Assess for home care needs following discharge   - Consider OT consult to assist with ADL evaluation and planning for discharge  - Provide patient education as appropriate  1/6/2024 2006 by Pratima Moses RN  Outcome: Progressing  1/6/2024 1845 by Pratima Moses RN  Outcome: Progressing  Goal: Maintains/Returns to pre admission functional level  Description: INTERVENTIONS:  - Perform AM-PAC 6 Click Basic Mobility/ Daily Activity assessment daily.  - Set and communicate daily mobility goal to care team and patient/family/caregiver.   - Collaborate with rehabilitation services on mobility goals if consulted  - Ambulate patient 3 times a day  - Out of bed to chair 3 times a day   - Out of bed for meals 3 times a day  - Out of bed for toileting  - Record patient progress and toleration of activity level   1/6/2024 2006 by Pratima Moses RN  Outcome: Progressing  1/6/2024 1845 by Pratima Moses RN  Outcome: Progressing     Problem: DISCHARGE PLANNING  Goal: Discharge to home or other facility with appropriate resources  Description: INTERVENTIONS:  - Identify barriers to discharge w/patient and caregiver  - Arrange for needed discharge resources and transportation as appropriate  - Identify discharge learning needs (meds, wound care, etc.)  - Refer to Case Management Department for coordinating discharge planning if the patient needs post-hospital services based on physician/advanced practitioner  order or complex needs related to functional status, cognitive ability, or social support system  1/6/2024 2006 by Pratima Moses RN  Outcome: Progressing  1/6/2024 1845 by Pratima Moses RN  Outcome: Progressing     Problem: Knowledge Deficit  Goal: Patient/family/caregiver demonstrates understanding of disease process, treatment plan, medications, and discharge instructions  Description: Complete learning assessment and assess knowledge base.  Interventions:  - Provide teaching at level of understanding  - Provide teaching via preferred learning methods  1/6/2024 2006 by Pratima Moses RN  Outcome: Progressing  1/6/2024 1845 by Pratima Moses RN  Outcome: Progressing

## 2024-01-07 NOTE — DISCHARGE SUMMARY
[facility]  Discharge- Jhonny Eastman 1980, 43 y.o. male MRN: [id number]  Unit/Bed#: E4 -02 Encounter: [id number]  Primary Care Provider: No primary care provider on file.   Date and time admitted to hospital: 1/4/2024 10:47 PM      Admitting Provider:  Cheo West MD  Discharge Provider:  Jonas Mott DO  Admission Date: 1/4/2024       Discharge Date: 01/07/24   LOS: 2  Primary Care Physician at Discharge: No primary care provider on file. None    HOSPITAL COURSE:  Jhonny Eastman is a 43 y.o. male who presented with a 3-day history of flulike symptoms including sore throat, nasal congestion and otorrhea from the right ear described as a yellow discharge.  The patient had reported he had influenza 2 weeks ago.  He had traveled to [place] over the holidays with his family and his children became sick.  3 days ago he became significantly worse and presented to the ER for evaluation.  The patient reports muffled hearing on the right, he also reported having facial weakness on the right side.  Flu and COVID as well as RSV testing were negative.  Lyme titer additionally was negative.  The patient has no other medical problems.  Due to facial palsy he was evaluated by otolaryngology as well as the neurology service.    Patient was started on Unasyn, he was started on high-dose dexamethasone.  His right facial palsy improved and he was able to close his eye although with some weakness.  Lyme titer was unrevealing, HSV was pending.  The patient was discharged on a steroid taper as well as valacyclovir given some benefit with facial palsy with antiviral treatment.  Patient was advised to call the [facility] internal medicine office for finalize HSV results which can take up to 1 week to return.    Patient was tolerating oral diet, he was able to close his right eye.  There was no concern for corneal abrasion and he was discharged in stable condition with planned outpatient  "follow-up.    Patient's facial palsy was arranged a House-Brackman score of 1-2 out of 8, or severe on admission.  At time of discharge this was improved    At the time of discharge the patient was tolerating oral diet they were without acute complaint and they were medically cleared for discharge.  All questions were answered the patient's satisfaction and they were in agreement with the discharge plan.    DISCHARGE DIAGNOSES  * Otomastoiditis, acute, right  Assessment & Plan  Patient with no significant PMHx presented to the ED with complaint of three days of bilateral ear \"fullness,\" right ear pain with clear/yellow drainage, dysequilibrium, and right-sided facial weakness  Continue Unasyn #2- change to Augmentin at discharge  Tolerating antibiotic therapy with no adverse effects  Outpatient ENT follow up    Periodontal disease  Assessment & Plan  CT facial bones demonstrating \"Dental caries and periodontal disease, most prominent involving the right maxillary molar and left posterior most mandibular molar. Correlation with the patient's symptoms recommended. \"  Outpatient follow-up, instructions placed  No evidence of abscess     Cranial nerve VII palsy  Assessment & Plan  Patient reports that shortly after his right ear symptoms developed, he noted he had a right-sided facial droop   Continue steroids with plan for a long taper at discharge  Lyme titer is negative  HSV pending but low clinical suspicion for Osteen Nicholson patient has no visible lesions  Start of Valtrex at discharge given some benefit for patients with Bell's Palsy  Continue omeprazole or Protonix at discharge  Continue emollients for patient's eye as well as artificial tears  Patient has been instructed on eye taping.        CONSULTING PROVIDERS   IP CONSULT TO NEUROLOGY  IP CONSULT TO ENT  IP CONSULT TO PHARMACY    PROCEDURES PERFORMED  * No surgery found *    RADIOLOGY RESULTS  No results found.    LABS  Results from last 7 days   Lab Units " "01/05/24  0517 01/05/24  0142   WBC Thousand/uL 11.37* 13.16*   HEMOGLOBIN g/dL 14.1 14.5   HEMATOCRIT % 42.5 43.8   MCV fL 86 85   TOTAL NEUT ABS Thousand/uL  --  8.03*   BANDS PCT %  --  11*   PLATELETS Thousands/uL 311 334   INR   --  1.03     Results from last 7 days   Lab Units 01/07/24  0515 01/06/24  0535 01/05/24  0517 01/05/24  0142   SODIUM mmol/L 134* 139 140 141   POTASSIUM mmol/L 4.6 4.1 3.8 3.8   CHLORIDE mmol/L 104 107 109* 109*   CO2 mmol/L 23 24 24 24   BUN mg/dL 19 13 12 13   CREATININE mg/dL 0.95 0.81 0.88 1.01   CALCIUM mg/dL 9.5 9.1 8.7 9.2   ALBUMIN g/dL  --   --   --  4.3   TOTAL BILIRUBIN mg/dL  --   --   --  0.47   ALK PHOS U/L  --   --   --  61   ALT U/L  --   --   --  30   AST U/L  --   --   --  15   EGFR ml/min/1.73sq m 99 110 106 91   GLUCOSE RANDOM mg/dL 275* 188* 139 114                              Results from last 7 days   Lab Units 01/05/24  0142   LACTIC ACID mmol/L 0.9   PROCALCITONIN ng/ml 0.05           Cultures:         Invalid input(s): \"URIBILINOGEN\"        Results from last 7 days   Lab Units 01/05/24  1001 01/05/24  0147 01/05/24 0142   BLOOD CULTURE   --  No Growth at 24 hrs. No Growth at 24 hrs.   GRAM STAIN RESULT  2+ Polys  No bacteria seen  --   --    WOUND CULTURE  No growth  --   --    INFLUENZA A PCR   --   --  Negative     Results from last 7 days   Lab Units 01/05/24 0142   SARS-COV-2  Negative   INFLUENZA A PCR  Negative   INFLUENZA B PCR  Negative   RSV PCR  Negative         PHYSICAL EXAM:  Vitals:   Blood Pressure: 123/62 (01/07/24 0750)  Pulse: 92 (01/07/24 0750)  Temperature: (!) 97.4 °F (36.3 °C) (01/07/24 0750)  Temp Source: Temporal (01/07/24 0750)  Respirations: 18 (01/07/24 0750)  Height: 6' (182.9 cm) (01/05/24 1421)  Weight - Scale: 119 kg (263 lb) (01/05/24 1421)  SpO2: 95 % (01/07/24 0750)      General: well appearing, no acute distress  HEENT: atraumatic, PERRLA, moist mucosa, normal pharynx, normal tonsils and adenoids, normal tongue, no fluid " in sinuses  Neck: Trachea midline, no carotid bruit, no masses  Respiratory: normal chest wall expansion, CTA B  Cardiovascular: RRR, no m/r/g, Normal S1 and S2  Abdomen: Soft, non-tender, non-distended, normal bowel sounds in all quadrants, no hepatosplenomegaly, no tympany  Rectal: deferred  Musculoskeletal: Moves all  Integumentary: warm, dry, and pink, with no visible rash, purpura, or petechia  Heme/Lymph: no lymphadenopathy, no bruises  Neurological: Cranial Nerve 7 palsy  Psychiatric: cooperative with normal mood, affect, and cognition       Discharge Disposition: Home    AM-PAC Basic Mobility:  Basic Mobility Inpatient Raw Score: 24    JH-HLM Achieved: 7: Walk 25 feet or more  JH-HLM Goal: 8: Walk 250 feet or more    HLM Goal listed above. Continue with ongoing physical therapy and encourage appropriate mobility to improve upon HLM goals.      Test Results Pending at Discharge:   Pending Labs       Order Current Status    HSV TYPE 1,2 DNA PCR In process    Blood culture #1 Preliminary result    Blood culture #2 Preliminary result    Wound culture and Gram stain Preliminary result                Medications   Summary of Medication Adjustments made as a result of this hospitalization: See discharge summary and AVS for medication changes  Medication Dosing Tapers - Please refer to Discharge Medication List for details on any medication dosing tapers (if applicable to patient).  Discharge Medication List: See after visit summary for reconciled discharge medications.     Diet restrictions:         Diet Orders   (From admission, onward)                 Start     Ordered    01/05/24 0451  Diet Regular; Regular House  Diet effective now        References:    Adult Nutrition Support Algorithm    RD Therapeutic Diet Order Protocol   Question Answer Comment   Diet Type Regular    Regular Regular House    RD to adjust diet per protocol? Yes        01/05/24 8840                  Activity restrictions: No strenuous  activity  Discharge Condition: good    Outpatient Follow-Up and Discharge Instructions  See after visit summary section titled Discharge Instructions for information provided to patient and family.      Code Status: Level 1 - Full Code  Discharge Statement   I spent 86 minutes discharging the patient. This time was spent on the day of discharge. Greater than 50% of total time was spent with the patient and / or family counseling and / or coordination of care.    ** Please Note: This note was completed in part utilizing Nuance Dragon Medical One Software.  Grammatical errors, random word insertions, spelling mistakes, and incomplete sentences may be an occasional consequence of this system secondary to software limitations, ambient noise, and hardware issues.  If you have any questions or concerns about the content, text, or information contained within the body of this dictation, please contact the provider for clarification.**

## 2024-01-07 NOTE — INCIDENTAL FINDINGS
The following findings require follow up:  Radiographic finding   Finding: Fluid in the right mastoid air cells, external auditory canal, and middle ear, suspicious for right-sided otomastoiditis. No discrete bony destruction or abscess is identified.     Dental caries and periodontal disease, most prominent involving the right maxillary molar and left posterior most mandibular molar. Correlation with the patient's symptoms recommended.     Shotty right cervical chain lymph nodes, nonspecific, possibly reactive.     Sinus disease as described; consider acute on chronic sinusitis.     No acute intracranial process is seen otherwise. Other findings as above.   Follow up required: Yes   Follow up should be done within 1-2 week(s)    Please notify the following clinician to assist with the follow up:  PCP and ENT

## 2024-01-07 NOTE — RESTORATIVE TECHNICIAN NOTE
Restorative Technician Note      Patient Name: Jhonny Eastman     Restorative Tech Visit Date: 01/07/24  Note Type: Mobility  Patient Position Upon Consult: Supine  Activity Performed: Ambulated  Patient Position at End of Consult: Supine; All needs within reach

## 2024-01-07 NOTE — PLAN OF CARE
Problem: PAIN - ADULT  Goal: Verbalizes/displays adequate comfort level or baseline comfort level  Description: Interventions:  - Encourage patient to monitor pain and request assistance  - Assess pain using appropriate pain scale  - Administer analgesics based on type and severity of pain and evaluate response  - Implement non-pharmacological measures as appropriate and evaluate response  - Consider cultural and social influences on pain and pain management  - Notify physician/advanced practitioner if interventions unsuccessful or patient reports new pain  Outcome: Adequate for Discharge     Problem: INFECTION - ADULT  Goal: Absence or prevention of progression during hospitalization  Description: INTERVENTIONS:  - Assess and monitor for signs and symptoms of infection  - Monitor lab/diagnostic results  - Monitor all insertion sites, i.e. indwelling lines, tubes, and drains  - [place] appropriate cooling/warming therapies per order  - Administer medications as ordered  - Instruct and encourage patient and family to use good hand hygiene technique  Outcome: Adequate for Discharge     Problem: SAFETY ADULT  Goal: Patient will remain free of falls  Description: INTERVENTIONS:  - Educate patient/family on patient safety including physical limitations  - Instruct patient to call for assistance with activity   - Consult OT/PT to assist with strengthening/mobility   - Keep Call bell within reach  - Keep bed low and locked with side rails adjusted as appropriate  - Keep care items and personal belongings within reach  - Initiate and maintain comfort rounds  Outcome: Adequate for Discharge  Goal: Maintain or return to baseline ADL function  Description: INTERVENTIONS:  -  Assess patient's ability to carry out ADLs; assess patient's baseline for ADL function and identify physical deficits which impact ability to perform ADLs (bathing, care of mouth/teeth, toileting, grooming, dressing, etc.)  - Assess/evaluate cause of  self-care deficits   - Assess range of motion  - Assess patient's mobility; develop plan if impaired  - Assess patient's need for assistive devices and provide as appropriate  - Encourage maximum independence but intervene and supervise when necessary  - Involve family in performance of ADLs  - Assess for home care needs following discharge   - Consider OT consult to assist with ADL evaluation and planning for discharge  - Provide patient education as appropriate  Outcome: Adequate for Discharge  Goal: Maintains/Returns to pre admission functional level  Description: INTERVENTIONS:  - Perform AM-PAC 6 Click Basic Mobility/ Daily Activity assessment daily.  - Set and communicate daily mobility goal to care team and patient/family/caregiver.   - Collaborate with rehabilitation services on mobility goals if consulted  - Ambulate patient 3 times a day  - Out of bed to chair 3 times a day   - Out of bed for meals 3 times a day  - Out of bed for toileting  - Record patient progress and toleration of activity level   Outcome: Adequate for Discharge     Problem: DISCHARGE PLANNING  Goal: Discharge to home or other facility with appropriate resources  Description: INTERVENTIONS:  - Identify barriers to discharge w/patient and caregiver  - Arrange for needed discharge resources and transportation as appropriate  - Identify discharge learning needs (meds, wound care, etc.)  - Refer to Case Management Department for coordinating discharge planning if the patient needs post-hospital services based on physician/advanced practitioner order or complex needs related to functional status, cognitive ability, or social support system  Outcome: Adequate for Discharge     Problem: Knowledge Deficit  Goal: Patient/family/caregiver demonstrates understanding of disease process, treatment plan, medications, and discharge instructions  Description: Complete learning assessment and assess knowledge base.  Interventions:  - Provide teaching at  level of understanding  - Provide teaching via preferred learning methods  Outcome: Adequate for Discharge

## 2024-01-07 NOTE — ASSESSMENT & PLAN NOTE
"CT facial bones demonstrating \"Dental caries and periodontal disease, most prominent involving the right maxillary molar and left posterior most mandibular molar. Correlation with the patient's symptoms recommended. \"  Outpatient follow-up, instructions placed  No evidence of abscess   "

## 2024-01-07 NOTE — CASE MANAGEMENT
Case Management Discharge Planning Note    Patient name Jhonny Eastman  Location East 4 /E4 -* MRN [id number]  : 1980 Date 2024       Current Admission Date: 2024  Current Admission Diagnosis:Otomastoiditis, acute, right   Patient Active Problem List    Diagnosis Date Noted    Otomastoiditis, acute, right 2024    Cranial nerve VII palsy 2024    Periodontal disease 2024      LOS (days): 2  Geometric Mean LOS (GMLOS) (days):   Days to GMLOS:     OBJECTIVE:  Risk of Unplanned Readmission Score: 7.21         Current admission status: Inpatient   Preferred Pharmacy:   [id number] - [place] - [address]  [address] [address]  Phone: [phone] Fax: [phone]    Primary Care Provider: No primary care provider on file.    Primary Insurance:   Secondary Insurance:     DISCHARGE DETAILS:    Discharge planning discussed with:: Patient  Freedom of Choice: Yes  Comments - Freedom of Choice: Meds sent to [facility]. Patient stating that he is able to pay for them  CM contacted family/caregiver?: No- see comments (pt declined at this time)  Were Treatment Team discharge recommendations reviewed with patient/caregiver?: Yes  Did patient/caregiver verbalize understanding of patient care needs?: Yes  Were patient/caregiver advised of the risks associated with not following Treatment Team discharge recommendations?: Yes         Requested Home Health Care         Is the patient interested in HHC at discharge?: No    DME Referral Provided  Referral made for DME?: No    Other Referral/Resources/Interventions Provided:  Financial Resources Provided: Financial Counselor    Would you like to participate in our [facility] service program?  : No - Declined    Treatment Team Recommendation: Home  Discharge Destination Plan:: Home  Transport at Discharge : Auto with designated             CM notified by [facility] that meds were sent to  [facility]. LACEY called this pharmacy to price check the meds and the total is about $460. LACEY met with patient to discuss. He states that he is a . [facility] is assisting with applying for [place] insurance. He said that he can pay for the meds. LACEY provided him with a Good Rx card. Patient plans to follow up with [facility] and Billing Office to discuss hospital stay cost. LACEY provided patient with Billing phone number. Patient states that he has a ride home at discharge.

## 2024-01-07 NOTE — DISCHARGE INSTR - AVS FIRST PAGE
Dear Jhonny Eastman,     It was our pleasure to care for you here at [facility].  It is our hope that we were always able to exceed the expected standards for your care during your stay.  You were hospitalized due to Otomastoditis causing facial palsy.  You were cared for on the 4th floor by Jonas Mott, DO with the [facility] who covers for your primary care physician (PCP), No primary care provider on file., while you were hospitalized.  If you have any questions or concerns related to this hospitalization, you may contact us at .  For follow up as well as any medication refills, we recommend that you follow up with your primary care physician.  A registered nurse will reach out to you by phone within a few days after your discharge to answer any additional questions that you may have after going home.  However, at this time we provide for you here, the most important instructions / recommendations at discharge:     Notable Medication Adjustments -   Valtrex - take 1000 mg three times a day for one week  Augmentin - Antibiotic  High-dose steroid taper over the next 15 days.   Prednisone 60 mg each morning x 5 days, decreased to 40 mg each morning x 5 days followed by 20 mg each morning x 5 days.  ENT will provide further steroids   - Start ciprodex drops 4 drops right ear twice a day. Place cotton after application x 1 hour, then may remove cotton.   -  Eye protection: artificial tears every 1-2 hours throughout the day. Tape right eye closed at night .  Protonix for stomach while on steriods. You can can also take over the counter Omeprazole  Testing Required after Discharge -   See below  Important follow up information -   ENT Follow up: Patient follow up outpatient for reevaluation with audiometry once stable, on oral medications.   Other Instructions -   HSV titers. - Call the office next week to get results  Please review this  entire after visit summary as additional general instructions including medication list, appointments, activity, diet, any pertinent wound care, and other additional recommendations from your care team that may be provided for you.      Sincerely,     Jonas Mott DO and Nurse Jose Ramon Christie

## 2024-01-08 LAB
BACTERIA WND AEROBE CULT: ABNORMAL
GRAM STN SPEC: ABNORMAL
GRAM STN SPEC: ABNORMAL

## 2024-01-09 LAB
HSV1 DNA SPEC QL NAA+PROBE: NEGATIVE
HSV2 DNA SPEC QL NAA+PROBE: NEGATIVE

## 2024-01-09 NOTE — RESULT ENCOUNTER NOTE
Attempted to call to inform of negative HSV results. Pt was admitted for facial nerve palsy, result was pending at time of discharge. No Answer. Left generic message

## 2024-01-10 LAB
BACTERIA BLD CULT: NORMAL
BACTERIA BLD CULT: NORMAL

## 2024-01-29 ENCOUNTER — HOSPITAL ENCOUNTER (INPATIENT)
Facility: HOSPITAL | Age: 44
LOS: 3 days | Discharge: HOME | DRG: 420 | End: 2024-02-01
Attending: EMERGENCY MEDICINE | Admitting: INTERNAL MEDICINE
Payer: COMMERCIAL

## 2024-01-29 DIAGNOSIS — H70.001 MASTOIDITIS, ACUTE, RIGHT: ICD-10-CM

## 2024-01-29 DIAGNOSIS — E78.5 DYSLIPIDEMIA: ICD-10-CM

## 2024-01-29 DIAGNOSIS — E11.10 DIABETIC KETOACIDOSIS WITHOUT COMA ASSOCIATED WITH TYPE 2 DIABETES MELLITUS (HCC): Primary | ICD-10-CM

## 2024-01-29 PROBLEM — N17.9 ACUTE KIDNEY INJURY (HCC): Status: ACTIVE | Noted: 2024-01-29

## 2024-01-29 PROBLEM — E87.5 HYPERKALEMIA: Status: ACTIVE | Noted: 2024-01-29

## 2024-01-29 PROBLEM — R73.9 HYPERGLYCEMIA: Status: RESOLVED | Noted: 2024-01-29 | Resolved: 2024-01-29

## 2024-01-29 PROBLEM — R73.9 HYPERGLYCEMIA: Status: ACTIVE | Noted: 2024-01-29

## 2024-01-29 LAB
ALBUMIN SERPL BCP-MCNC: 4.4 G/DL (ref 3.5–5)
ALP SERPL-CCNC: 149 U/L (ref 34–104)
ALT SERPL W P-5'-P-CCNC: 40 U/L (ref 7–52)
ANION GAP SERPL CALCULATED.3IONS-SCNC: 14 MMOL/L
ANION GAP SERPL CALCULATED.3IONS-SCNC: 18 MMOL/L
ANION GAP SERPL CALCULATED.3IONS-SCNC: 21 MMOL/L
AST SERPL W P-5'-P-CCNC: 14 U/L (ref 13–39)
ATRIAL RATE: 92 BPM
BACTERIA UR QL AUTO: NORMAL /HPF
BASE EX.OXY STD BLDV CALC-SCNC: 83.9 % (ref 60–80)
BASE EXCESS BLDV CALC-SCNC: -6.8 MMOL/L
BASOPHILS # BLD AUTO: 0.07 THOUSANDS/ÂΜL (ref 0–0.1)
BASOPHILS NFR BLD AUTO: 1 % (ref 0–1)
BETA-HYDROXYBUTYRATE: 5.3 MMOL/L
BILIRUB SERPL-MCNC: 0.77 MG/DL (ref 0.2–1)
BILIRUB UR QL STRIP: NEGATIVE
BUN SERPL-MCNC: 25 MG/DL (ref 5–25)
BUN SERPL-MCNC: 29 MG/DL (ref 5–25)
BUN SERPL-MCNC: 37 MG/DL (ref 5–25)
CALCIUM SERPL-MCNC: 10.8 MG/DL (ref 8.4–10.2)
CALCIUM SERPL-MCNC: 10.8 MG/DL (ref 8.4–10.2)
CALCIUM SERPL-MCNC: 9.9 MG/DL (ref 8.4–10.2)
CHLORIDE SERPL-SCNC: 104 MMOL/L (ref 96–108)
CHLORIDE SERPL-SCNC: 106 MMOL/L (ref 96–108)
CHLORIDE SERPL-SCNC: 89 MMOL/L (ref 96–108)
CLARITY UR: CLEAR
CO2 SERPL-SCNC: 21 MMOL/L (ref 21–32)
CO2 SERPL-SCNC: 22 MMOL/L (ref 21–32)
CO2 SERPL-SCNC: 27 MMOL/L (ref 21–32)
COLOR UR: COLORLESS
CREAT SERPL-MCNC: 1.14 MG/DL (ref 0.6–1.3)
CREAT SERPL-MCNC: 1.18 MG/DL (ref 0.6–1.3)
CREAT SERPL-MCNC: 1.63 MG/DL (ref 0.6–1.3)
EOSINOPHIL # BLD AUTO: 0.02 THOUSAND/ÂΜL (ref 0–0.61)
EOSINOPHIL NFR BLD AUTO: 0 % (ref 0–6)
ERYTHROCYTE [DISTWIDTH] IN BLOOD BY AUTOMATED COUNT: 15.5 % (ref 11.6–15.1)
GFR SERPL CREATININE-BSD FRML MDRD: 51 ML/MIN/1.73SQ M
GFR SERPL CREATININE-BSD FRML MDRD: 76 ML/MIN/1.73SQ M
GFR SERPL CREATININE-BSD FRML MDRD: 79 ML/MIN/1.73SQ M
GLUCOSE SERPL-MCNC: 1239 MG/DL (ref 65–140)
GLUCOSE SERPL-MCNC: 189 MG/DL (ref 65–140)
GLUCOSE SERPL-MCNC: 228 MG/DL (ref 65–140)
GLUCOSE SERPL-MCNC: 301 MG/DL (ref 65–140)
GLUCOSE SERPL-MCNC: 327 MG/DL (ref 65–140)
GLUCOSE SERPL-MCNC: 329 MG/DL (ref 65–140)
GLUCOSE SERPL-MCNC: 365 MG/DL (ref 65–140)
GLUCOSE SERPL-MCNC: 484 MG/DL (ref 65–140)
GLUCOSE SERPL-MCNC: 502 MG/DL (ref 65–140)
GLUCOSE SERPL-MCNC: 728 MG/DL (ref 65–140)
GLUCOSE SERPL-MCNC: >500 MG/DL (ref 65–140)
GLUCOSE UR STRIP-MCNC: ABNORMAL MG/DL
HCO3 BLDV-SCNC: 19.7 MMOL/L (ref 24–30)
HCT VFR BLD AUTO: 42.9 % (ref 36.5–49.3)
HGB BLD-MCNC: 15 G/DL (ref 12–17)
HGB UR QL STRIP.AUTO: NEGATIVE
IMM GRANULOCYTES # BLD AUTO: 0.05 THOUSAND/UL (ref 0–0.2)
IMM GRANULOCYTES NFR BLD AUTO: 0 % (ref 0–2)
KETONES UR STRIP-MCNC: ABNORMAL MG/DL
LEUKOCYTE ESTERASE UR QL STRIP: ABNORMAL
LIPASE SERPL-CCNC: 19 U/L (ref 11–82)
LYMPHOCYTES # BLD AUTO: 0.96 THOUSANDS/ÂΜL (ref 0.6–4.47)
LYMPHOCYTES NFR BLD AUTO: 8 % (ref 14–44)
MAGNESIUM SERPL-MCNC: 2.6 MG/DL (ref 1.9–2.7)
MAGNESIUM SERPL-MCNC: 2.7 MG/DL (ref 1.9–2.7)
MAGNESIUM SERPL-MCNC: 2.8 MG/DL (ref 1.9–2.7)
MCH RBC QN AUTO: 29.3 PG (ref 26.8–34.3)
MCHC RBC AUTO-ENTMCNC: 35 G/DL (ref 31.4–37.4)
MCV RBC AUTO: 84 FL (ref 82–98)
MONOCYTES # BLD AUTO: 0.27 THOUSAND/ÂΜL (ref 0.17–1.22)
MONOCYTES NFR BLD AUTO: 2 % (ref 4–12)
NEUTROPHILS # BLD AUTO: 10.57 THOUSANDS/ÂΜL (ref 1.85–7.62)
NEUTS SEG NFR BLD AUTO: 89 % (ref 43–75)
NITRITE UR QL STRIP: NEGATIVE
NON-SQ EPI CELLS URNS QL MICRO: NORMAL /HPF
NRBC BLD AUTO-RTO: 0 /100 WBCS
O2 CT BLDV-SCNC: 20.4 ML/DL
P AXIS: 69 DEGREES
PCO2 BLDV: 42.7 MM HG (ref 42–50)
PH BLDV: 7.28 [PH] (ref 7.3–7.4)
PH UR STRIP.AUTO: 5 [PH]
PHOSPHATE SERPL-MCNC: 4.4 MG/DL (ref 2.7–4.5)
PHOSPHATE SERPL-MCNC: 5.3 MG/DL (ref 2.7–4.5)
PLATELET # BLD AUTO: 226 THOUSANDS/UL (ref 149–390)
PMV BLD AUTO: 12.7 FL (ref 8.9–12.7)
PO2 BLDV: 57.3 MM HG (ref 35–45)
POTASSIUM SERPL-SCNC: 3.9 MMOL/L (ref 3.5–5.3)
POTASSIUM SERPL-SCNC: 4.3 MMOL/L (ref 3.5–5.3)
POTASSIUM SERPL-SCNC: 5.7 MMOL/L (ref 3.5–5.3)
PR INTERVAL: 136 MS
PROT SERPL-MCNC: 7.8 G/DL (ref 6.4–8.4)
PROT UR STRIP-MCNC: NEGATIVE MG/DL
QRS AXIS: -83 DEGREES
QRSD INTERVAL: 82 MS
QT INTERVAL: 352 MS
QTC INTERVAL: 435 MS
RBC # BLD AUTO: 5.12 MILLION/UL (ref 3.88–5.62)
RBC #/AREA URNS AUTO: NORMAL /HPF
SODIUM SERPL-SCNC: 131 MMOL/L (ref 135–147)
SODIUM SERPL-SCNC: 144 MMOL/L (ref 135–147)
SODIUM SERPL-SCNC: 147 MMOL/L (ref 135–147)
SP GR UR STRIP.AUTO: 1.03 (ref 1–1.03)
T WAVE AXIS: 21 DEGREES
UROBILINOGEN UR STRIP-ACNC: <2 MG/DL
VENTRICULAR RATE: 92 BPM
WBC # BLD AUTO: 11.94 THOUSAND/UL (ref 4.31–10.16)
WBC #/AREA URNS AUTO: NORMAL /HPF

## 2024-01-29 PROCEDURE — 81001 URINALYSIS AUTO W/SCOPE: CPT

## 2024-01-29 PROCEDURE — 99284 EMERGENCY DEPT VISIT MOD MDM: CPT

## 2024-01-29 PROCEDURE — 82948 REAGENT STRIP/BLOOD GLUCOSE: CPT

## 2024-01-29 PROCEDURE — 83690 ASSAY OF LIPASE: CPT

## 2024-01-29 PROCEDURE — 96366 THER/PROPH/DIAG IV INF ADDON: CPT

## 2024-01-29 PROCEDURE — 84100 ASSAY OF PHOSPHORUS: CPT | Performed by: INTERNAL MEDICINE

## 2024-01-29 PROCEDURE — 82010 KETONE BODYS QUAN: CPT

## 2024-01-29 PROCEDURE — 96365 THER/PROPH/DIAG IV INF INIT: CPT

## 2024-01-29 PROCEDURE — 96376 TX/PRO/DX INJ SAME DRUG ADON: CPT

## 2024-01-29 PROCEDURE — 93005 ELECTROCARDIOGRAM TRACING: CPT

## 2024-01-29 PROCEDURE — 36415 COLL VENOUS BLD VENIPUNCTURE: CPT

## 2024-01-29 PROCEDURE — 96368 THER/DIAG CONCURRENT INF: CPT

## 2024-01-29 PROCEDURE — 83735 ASSAY OF MAGNESIUM: CPT

## 2024-01-29 PROCEDURE — 83735 ASSAY OF MAGNESIUM: CPT | Performed by: INTERNAL MEDICINE

## 2024-01-29 PROCEDURE — 80048 BASIC METABOLIC PNL TOTAL CA: CPT | Performed by: INTERNAL MEDICINE

## 2024-01-29 PROCEDURE — 84100 ASSAY OF PHOSPHORUS: CPT | Performed by: NURSE PRACTITIONER

## 2024-01-29 PROCEDURE — 83735 ASSAY OF MAGNESIUM: CPT | Performed by: NURSE PRACTITIONER

## 2024-01-29 PROCEDURE — 82947 ASSAY GLUCOSE BLOOD QUANT: CPT

## 2024-01-29 PROCEDURE — 80053 COMPREHEN METABOLIC PANEL: CPT

## 2024-01-29 PROCEDURE — 99223 1ST HOSP IP/OBS HIGH 75: CPT | Performed by: INTERNAL MEDICINE

## 2024-01-29 PROCEDURE — 85025 COMPLETE CBC W/AUTO DIFF WBC: CPT

## 2024-01-29 PROCEDURE — 82805 BLOOD GASES W/O2 SATURATION: CPT

## 2024-01-29 PROCEDURE — 99285 EMERGENCY DEPT VISIT HI MDM: CPT | Performed by: EMERGENCY MEDICINE

## 2024-01-29 PROCEDURE — 80048 BASIC METABOLIC PNL TOTAL CA: CPT | Performed by: NURSE PRACTITIONER

## 2024-01-29 RX ORDER — PANTOPRAZOLE SODIUM 40 MG/1
40 TABLET, DELAYED RELEASE ORAL
Status: COMPLETED | OUTPATIENT
Start: 2024-01-29 | End: 2024-01-29

## 2024-01-29 RX ORDER — SODIUM CHLORIDE, SODIUM GLUCONATE, SODIUM ACETATE, POTASSIUM CHLORIDE, MAGNESIUM CHLORIDE, SODIUM PHOSPHATE, DIBASIC, AND POTASSIUM PHOSPHATE .53; .5; .37; .037; .03; .012; .00082 G/100ML; G/100ML; G/100ML; G/100ML; G/100ML; G/100ML; G/100ML
500 INJECTION, SOLUTION INTRAVENOUS CONTINUOUS
Status: DISPENSED | OUTPATIENT
Start: 2024-01-29 | End: 2024-01-29

## 2024-01-29 RX ORDER — SODIUM CHLORIDE, SODIUM GLUCONATE, SODIUM ACETATE, POTASSIUM CHLORIDE, MAGNESIUM CHLORIDE, SODIUM PHOSPHATE, DIBASIC, AND POTASSIUM PHOSPHATE .53; .5; .37; .037; .03; .012; .00082 G/100ML; G/100ML; G/100ML; G/100ML; G/100ML; G/100ML; G/100ML
250 INJECTION, SOLUTION INTRAVENOUS CONTINUOUS
Status: DISCONTINUED | OUTPATIENT
Start: 2024-01-29 | End: 2024-01-30 | Stop reason: ALTCHOICE

## 2024-01-29 RX ORDER — PANTOPRAZOLE SODIUM 40 MG/1
40 TABLET, DELAYED RELEASE ORAL
Status: DISCONTINUED | OUTPATIENT
Start: 2024-01-30 | End: 2024-01-29

## 2024-01-29 RX ORDER — SODIUM CHLORIDE, SODIUM GLUCONATE, SODIUM ACETATE, POTASSIUM CHLORIDE, MAGNESIUM CHLORIDE, SODIUM PHOSPHATE, DIBASIC, AND POTASSIUM PHOSPHATE .53; .5; .37; .037; .03; .012; .00082 G/100ML; G/100ML; G/100ML; G/100ML; G/100ML; G/100ML; G/100ML
1000 INJECTION, SOLUTION INTRAVENOUS ONCE
Status: COMPLETED | OUTPATIENT
Start: 2024-01-29 | End: 2024-01-29

## 2024-01-29 RX ORDER — DEXTROSE AND SODIUM CHLORIDE 5; .45 G/100ML; G/100ML
125 INJECTION, SOLUTION INTRAVENOUS CONTINUOUS
Status: DISCONTINUED | OUTPATIENT
Start: 2024-01-29 | End: 2024-01-30

## 2024-01-29 RX ORDER — CHLORHEXIDINE GLUCONATE ORAL RINSE 1.2 MG/ML
15 SOLUTION DENTAL EVERY 12 HOURS SCHEDULED
Status: DISCONTINUED | OUTPATIENT
Start: 2024-01-29 | End: 2024-01-30

## 2024-01-29 RX ORDER — POTASSIUM CHLORIDE 20 MEQ/1
20 TABLET, EXTENDED RELEASE ORAL ONCE
Status: COMPLETED | OUTPATIENT
Start: 2024-01-29 | End: 2024-01-29

## 2024-01-29 RX ORDER — POLYETHYLENE GLYCOL 3350 17 G/17G
17 POWDER, FOR SOLUTION ORAL DAILY
Status: DISCONTINUED | OUTPATIENT
Start: 2024-01-30 | End: 2024-01-30

## 2024-01-29 RX ADMIN — SODIUM CHLORIDE, SODIUM GLUCONATE, SODIUM ACETATE, POTASSIUM CHLORIDE, MAGNESIUM CHLORIDE, SODIUM PHOSPHATE, DIBASIC, AND POTASSIUM PHOSPHATE 250 ML/HR: .53; .5; .37; .037; .03; .012; .00082 INJECTION, SOLUTION INTRAVENOUS at 20:42

## 2024-01-29 RX ADMIN — POTASSIUM CHLORIDE 20 MEQ: 1500 TABLET, EXTENDED RELEASE ORAL at 19:36

## 2024-01-29 RX ADMIN — DEXTROSE AND SODIUM CHLORIDE 125 ML/HR: 5; .45 INJECTION, SOLUTION INTRAVENOUS at 22:23

## 2024-01-29 RX ADMIN — SODIUM CHLORIDE, SODIUM GLUCONATE, SODIUM ACETATE, POTASSIUM CHLORIDE, MAGNESIUM CHLORIDE, SODIUM PHOSPHATE, DIBASIC, AND POTASSIUM PHOSPHATE 1000 ML: .53; .5; .37; .037; .03; .012; .00082 INJECTION, SOLUTION INTRAVENOUS at 12:40

## 2024-01-29 RX ADMIN — POTASSIUM CHLORIDE 20 MEQ: 1500 TABLET, EXTENDED RELEASE ORAL at 22:23

## 2024-01-29 RX ADMIN — PANTOPRAZOLE SODIUM 40 MG: 40 TABLET, DELAYED RELEASE ORAL at 23:11

## 2024-01-29 RX ADMIN — INSULIN HUMAN 8 UNITS: 100 INJECTION, SOLUTION PARENTERAL at 14:40

## 2024-01-29 RX ADMIN — SODIUM CHLORIDE, SODIUM GLUCONATE, SODIUM ACETATE, POTASSIUM CHLORIDE, MAGNESIUM CHLORIDE, SODIUM PHOSPHATE, DIBASIC, AND POTASSIUM PHOSPHATE 500 ML/HR: .53; .5; .37; .037; .03; .012; .00082 INJECTION, SOLUTION INTRAVENOUS at 17:41

## 2024-01-29 RX ADMIN — CHLORHEXIDINE GLUCONATE 15 ML: 1.2 RINSE ORAL at 23:11

## 2024-01-29 RX ADMIN — SODIUM CHLORIDE, SODIUM GLUCONATE, SODIUM ACETATE, POTASSIUM CHLORIDE, MAGNESIUM CHLORIDE, SODIUM PHOSPHATE, DIBASIC, AND POTASSIUM PHOSPHATE 1000 ML: .53; .5; .37; .037; .03; .012; .00082 INJECTION, SOLUTION INTRAVENOUS at 13:45

## 2024-01-29 RX ADMIN — SODIUM CHLORIDE 10.6 UNITS/HR: 9 INJECTION, SOLUTION INTRAVENOUS at 14:42

## 2024-01-29 NOTE — ASSESSMENT & PLAN NOTE
Patient is a 43 yr old male with complaints of 1 week of fatigue, weakness, polydipsia, polyuria, blurred vision.  Initial BG 1200 with VBG 7.28/42/57/19.7. Was recently inpatient in January for otomastoiditis and prescribed steroids. Patient given 2 L NS in ED with insulin bolus and gtt. With the patients hyperosmolar state will need to consider HHK.       Obtain HgA1c   IVF as ordered    Continue insulin gtt per protocol   Monitor electrolytes Q4 hours

## 2024-01-29 NOTE — ED NOTES
"The patient\"s  urine output was almost 2000 in the urinals.     Dulce Maria Gamez  01/29/24 5847    "

## 2024-01-29 NOTE — ED ATTENDING ATTESTATION
1/29/2024  IKarli DO, saw and evaluated the patient. I have discussed the patient with the resident/non-physician practitioner and agree with the resident's/non-physician practitioner's findings, Plan of Care, and MDM as documented in the resident's/non-physician practitioner's note, except where noted. All available labs and Radiology studies were reviewed.  I was present for key portions of any procedure(s) performed by the resident/non-physician practitioner and I was immediately available to provide assistance.       At this point I agree with the current assessment done in the Emergency Department.  I have conducted an independent evaluation of this patient a history and physical is as follows:    ED Course         Critical Care Time  Procedures    43-year-old male presents to the ED with a 2-week history of frequency of urination, fatigue, blurred vision, decreased appetite and 30 pound weight loss.  No fever, chest pain or shortness of breath.  Accu-Chek in triage greater than 500.  No history of diabetes.  He was recently admitted in the beginning of January for Bell's palsy and at that time his blood sugar was normal.  On exam he is alert no acute distress.  Mucous membranes are moist.  Heart is regular without murmur.  Lungs clear.  Abdomen soft and nontender.  Skin is warm and dry without rash.  Will check basic labs rule out acidosis by adding VBG, beta hydroxybutyrate.  Will give IV fluids.  Rule out new onset diabetes, rule out DKA

## 2024-01-29 NOTE — ASSESSMENT & PLAN NOTE
POA 1 with history of fatigue, weakness, polyuria polydipsia blurred vision.  Weight loss proximately 30 pounds in about a month.    Blood glucose upon presentation over 1200, VBG 7.28/42/57/19.7, beta hydroxybutyrate 5.3 with anion gap of 21  Admitted DKA pathway anion gap closed around 0 300 transition to non-DKA insulin drip  Recently admitted stent mastoiditis treated with high-dose steroid and subsequently on a prednisone taper.  Family history  Concerning for new onset diabetes/HHS    Plan  Follow-up hemoglobin A1c  Continue non-DKA insulin drip plan to transition basal bolus   Endocrinology consulted given presumable new onset diabetes appreciate recommendations  Switch to carbohydrate controlled diet  Will need nutrition and diabetic education

## 2024-01-29 NOTE — ASSESSMENT & PLAN NOTE
Presented with hyperkalemia in the setting of acidosis.       Monitor electrolytes Q4 hours   Replete per protocol.

## 2024-01-29 NOTE — H&P
Formerly [facility]  H&P  Name: Jhonny Eastman 43 y.o. male I MRN: [id number]  Unit/Bed#: ICU 01 I Date of Admission: 1/29/2024   Date of Service: 1/29/2024 I Hospital Day: 0      Assessment/Plan   Diabetic keto-acidosis (HCC)  Assessment & Plan  Patient is a 43 yr old male with complaints of 1 week of fatigue, weakness, polydipsia, polyuria, blurred vision.  Initial BG 1200 with VBG 7.28/42/57/19.7. Was recently inpatient in January for otomastoiditis and prescribed steroids. Patient given 2 L NS in ED with insulin bolus and gtt. With the patients hyperosmolar state will need to consider HHK.       Obtain HgA1c   IVF as ordered    Continue insulin gtt per protocol   Monitor electrolytes Q4 hours    Endocrine consult given this is new onset        Acute kidney injury (HCC)  Assessment & Plan  Likely prerenal in the setting of hypovolemia due to DKA vs HHK.  Creatinine 1.6, baseline 0.8- 1.0. Urine with leukocytes, glucose and ketones.       Isolyte as ordered    Trend BMP    Avoid nephrotoxins    Monitor urine output       Hyperkalemia  Assessment & Plan  Presented with hyperkalemia in the setting of DKA- not truly hyperkalemic, more likely potassium depleted intracellular     Monitor electrolytes Q4 hours   Replete per protocol.         History of Present Illness     HPI: Jhonny Eastman is a 43 y.o. who presents with a 1 week history of polydipsia, polyuria, weakness, and blurred vision.  BG 1239 upon arrival to ED.  Patient recently admitted for facial palsy secondary to a otomastoiditis treated with Unasyn and high dose steroids.  Given IVF bolus x 2L and Insulin drip started     History obtained from the patient.  Review of Systems   Constitutional:  Positive for fatigue. Negative for diaphoresis and fever.   Cardiovascular: Negative.  Negative for chest pain, palpitations and leg swelling.   Gastrointestinal:  Negative for diarrhea, nausea and vomiting.   Endocrine: Positive for polydipsia,  polyphagia and polyuria. Negative for cold intolerance and heat intolerance.   Genitourinary: Negative.    Musculoskeletal: Negative.    Skin: Negative.    Neurological:  Positive for weakness. Negative for dizziness and headaches.     Disposition: Stepdown Level 1  Historical Information   Past Medical History:  No date: Bell's palsy No past surgical history on file.   Current Outpatient Medications   Medication Instructions    artificial tear (LUBRIFRESH P.M.) 83-15 % ophthalmic ointment Both Eyes, 3 times daily    ciprofloxacin-dexamethasone (CIPRODEX) otic suspension 4 drops, Right Ear, 2 times daily    pantoprazole (PROTONIX) 40 mg, Oral, Daily before breakfast    predniSONE 20 mg tablet T3 tabs x 5 days, then take 2 tablets x 5 days, then take 1 tablet x 5 days.    No Known Allergies   Social History     Tobacco Use    Smoking status: Never    Smokeless tobacco: Never   Substance Use Topics    Alcohol use: Never    Drug use: Never    History reviewed. No pertinent family history.       Objective                            Vitals I/O      Most Recent Min/Max in 24hrs   Temp (!) 97.4 °F (36.3 °C) Temp  Min: 97.4 °F (36.3 °C)  Max: 97.4 °F (36.3 °C)   Pulse 96 Pulse  Min: 91  Max: 104   Resp 17 Resp  Min: 15  Max: 28   /76 BP  Min: 126/68  Max: 143/67   O2 Sat 95 % SpO2  Min: 95 %  Max: 97 %      Intake/Output Summary (Last 24 hours) at 1/29/2024 1715  Last data filed at 1/29/2024 1343  Gross per 24 hour   Intake 1000 ml   Output --   Net 1000 ml       No diet orders on file    Invasive Monitoring           Physical Exam   Physical Exam  Skin:     General: Skin is warm and dry.   HENT:      Mouth/Throat:      Mouth: Mucous membranes are moist.   Cardiovascular:      Rate and Rhythm: Tachycardia present.      Pulses: Normal pulses.      Heart sounds: Murmur heard.   Musculoskeletal:         General: Normal range of motion.      Right lower leg: No edema.      Left lower leg: No edema.   Abdominal: General:  Bowel sounds are normal.      Palpations: Abdomen is soft.   Constitutional:       Appearance: He is well-developed.   Pulmonary:      Effort: Pulmonary effort is normal.   Neurological:      Mental Status: He is alert and oriented to person, place and time. He is calm.      Motor: Strength full and intact in all extremities.            Diagnostic Studies      EKG: ST   Imaging: I have personally reviewed pertinent reports.       Medications:  Scheduled PRN          Continuous    insulin regular (HumuLIN R,NovoLIN R) 1 Units/mL in sodium chloride 0.9 % 100 mL infusion, 0.1-30 Units/hr, Last Rate: 10.6 Units/hr (01/29/24 1442)  multi-electrolyte, 500 mL/hr   Followed by  multi-electrolyte, 250 mL/hr         Labs:    CBC    Recent Labs     01/29/24  1152   WBC 11.94*   HGB 15.0   HCT 42.9        BMP    Recent Labs     01/29/24  1152   SODIUM 131*   K 5.7*   CL 89*   CO2 21   AGAP 21   BUN 37*   CREATININE 1.63*   CALCIUM 10.8*       Coags    No recent results     Additional Electrolytes  Recent Labs     01/29/24  1152   MG 2.7          Blood Gas    No recent results  Recent Labs     01/29/24  1239   PHVEN 7.281*   FOA9ZHU 42.7   PO2VEN 57.3*   RBL8MBL 19.7*   BEVEN -6.8   U4YCGDZ 83.9*    LFTs  Recent Labs     01/29/24  1152   ALT 40   AST 14   ALKPHOS 149*   ALB 4.4   TBILI 0.77       Infectious  No recent results  Glucose  Recent Labs     01/29/24  1152 01/29/24  1549   GLUC 1,239* 728*             Anticipated Length of Stay is > 2 midnights  Adrienne Ibrahim

## 2024-01-29 NOTE — ASSESSMENT & PLAN NOTE
Likely prerenal in the setting of hypovolemia due to DKA vs HHK.  Creatinine 1.6, baseline 0.8- 1.0. Urine with leukocytes, glucose and ketones.       Isolyte as ordered    Trend BNP    Avoid nephrotoxins

## 2024-01-29 NOTE — ED PROVIDER NOTES
"History  Chief Complaint   Patient presents with    Fatigue     Pt report dx of bells palsy in beginning of jan. And taking meds with no relief (right side affected). Pt reports increased fatigue, decrease in vision in left eye, increase in urination, decrease in appetite. X-wife at bedside reports pt appears to be \"out of it\", sluggish to respond to questions. Pt reports almost 30 pound weight loss since beginning of Jan.      43M, recently admitted for mastoiditis and bells palsy p/w fatigue and frequent urination last few weeks, no hx of diabetes. Presenting with mild nausea, abd discomfort, tachycardic, accucheck glucose >500. Gait intact, reports occasional lightheadedness with blurry vision. Increased urination w/o dysuria.         Prior to Admission Medications   Prescriptions Last Dose Informant Patient Reported? Taking?   artificial tear (LUBRIFRESH P.M.) 83-15 % ophthalmic ointment   No Yes   Sig: Administer to both eyes 3 (three) times a day   Patient taking differently: Administer to both eyes 3 (three) times a day As Needed   ciprofloxacin-dexamethasone (CIPRODEX) otic suspension   No Yes   Sig: Administer 4 drops to the right ear 2 (two) times a day   pantoprazole (PROTONIX) 40 mg tablet   No Yes   Sig: Take 1 tablet (40 mg total) by mouth daily before breakfast   predniSONE 20 mg tablet Not Taking  No No   Sig: T3 tabs x 5 days, then take 2 tablets x 5 days, then take 1 tablet x 5 days.   Patient not taking: Reported on 1/29/2024      Facility-Administered Medications: None       Past Medical History:   Diagnosis Date    Bell's palsy        History reviewed. No pertinent surgical history.    History reviewed. No pertinent family history.  I have reviewed and agree with the history as documented.    E-Cigarette/Vaping    E-Cigarette Use Never User      E-Cigarette/Vaping Substances    Nicotine No     THC No     CBD No     Flavoring No     Other No     Unknown No      Social History     Tobacco Use    " Smoking status: Never    Smokeless tobacco: Never   Vaping Use    Vaping status: Never Used   Substance Use Topics    Alcohol use: Not Currently     Comment: Social    Drug use: Never        Review of Systems   All other systems reviewed and are negative.      Physical Exam  ED Triage Vitals   Temperature Pulse Respirations Blood Pressure SpO2   01/29/24 1206 01/29/24 1136 01/29/24 1136 01/29/24 1136 01/29/24 1136   (!) 97.4 °F (36.3 °C) 104 20 134/79 97 %      Temp Source Heart Rate Source Patient Position - Orthostatic VS BP Location FiO2 (%)   01/29/24 1725 01/29/24 1136 01/29/24 1136 01/29/24 1136 --   Oral Monitor Sitting Right arm       Pain Score       01/29/24 1715       No Pain             Orthostatic Vital Signs  Vitals:    01/29/24 1600 01/29/24 1630 01/29/24 1715 01/29/24 1725   BP: 140/81 135/76 131/76 131/76   Pulse: 95 96 101 96   Patient Position - Orthostatic VS: Lying Lying         Physical Exam  Vitals and nursing note reviewed.   Constitutional:       General: He is not in acute distress.     Appearance: He is well-developed.   HENT:      Head: Normocephalic and atraumatic.   Eyes:      Conjunctiva/sclera: Conjunctivae normal.   Cardiovascular:      Rate and Rhythm: Regular rhythm. Tachycardia present.      Heart sounds: No murmur heard.  Pulmonary:      Effort: Pulmonary effort is normal. No respiratory distress.      Breath sounds: Normal breath sounds.   Abdominal:      Palpations: Abdomen is soft.      Tenderness: There is no abdominal tenderness.   Musculoskeletal:         General: No swelling.      Cervical back: Neck supple.   Skin:     General: Skin is warm and dry.      Capillary Refill: Capillary refill takes less than 2 seconds.   Neurological:      Mental Status: He is alert.   Psychiatric:         Mood and Affect: Mood normal.         ED Medications  Medications   insulin regular (HumuLIN R,NovoLIN R) 1 Units/mL in sodium chloride 0.9 % 100 mL infusion (10.6 Units/hr Intravenous New  Bag 1/29/24 1442)   multi-electrolyte (ISOLYTE-S PH 7.4 equivalent) IV solution (500 mL/hr Intravenous New Bag 1/29/24 1741)     Followed by   multi-electrolyte (ISOLYTE-S PH 7.4 equivalent) IV solution (has no administration in time range)   dextrose 5 % and sodium chloride 0.45 % infusion (has no administration in time range)   multi-electrolyte (ISOLYTE-S PH 7.4) bolus 1,000 mL (0 mL Intravenous Stopped 1/29/24 1343)   multi-electrolyte (ISOLYTE-S PH 7.4) bolus 1,000 mL (1,000 mL Intravenous New Bag 1/29/24 1345)   insulin regular (HumuLIN R,NovoLIN R) injection 8 Units (8 Units Intravenous Given 1/29/24 1440)       Diagnostic Studies  Results Reviewed       Procedure Component Value Units Date/Time    Basic metabolic panel [[id number]]     Lab Status: No result Specimen: Blood     Magnesium [[id number]]     Lab Status: No result Specimen: Blood     Phosphorus [[id number]]     Lab Status: No result Specimen: Blood     Fingerstick Glucose (POCT) [[id number]]  (Abnormal) Collected: 01/29/24 1645    Lab Status: Final result Updated: 01/29/24 1646     POC Glucose >500 mg/dl     Glucose, random [[id number]]  (Abnormal) Collected: 01/29/24 1549    Lab Status: Final result Specimen: Blood from Arm, Left Updated: 01/29/24 1623     Glucose 728 mg/dL     Fingerstick Glucose (POCT) [[id number]]  (Abnormal) Collected: 01/29/24 1543    Lab Status: Final result Updated: 01/29/24 1553     POC Glucose >500 mg/dl     Fingerstick Glucose (POCT) [[id number]]  (Abnormal) Collected: 01/29/24 1440    Lab Status: Final result Updated: 01/29/24 1440     POC Glucose >500 mg/dl     Magnesium [[id number]]  (Normal) Collected: 01/29/24 1152    Lab Status: Final result Specimen: Blood from Arm, Right Updated: 01/29/24 1355     Magnesium 2.7 mg/dL     CBC and differential [[id number]]  (Abnormal) Collected: 01/29/24 1152    Lab Status: Edited Result - FINAL Specimen: Blood from Arm, Right Updated: 01/29/24 1353     WBC 11.94 Thousand/uL      RBC 5.12  Million/uL      Hemoglobin 15.0 g/dL      Hematocrit 42.9 %      MCV 84 fL      MCH 29.3 pg      MCHC 35.0 g/dL      RDW 15.5 %      MPV 12.7 fL      Platelets 226 Thousands/uL      nRBC 0 /100 WBCs      Neutrophils Relative 89 %      Immat GRANS % 0 %      Lymphocytes Relative 8 %      Monocytes Relative 2 %      Eosinophils Relative 0 %      Basophils Relative 1 %      Neutrophils Absolute 10.57 Thousands/µL      Immature Grans Absolute 0.05 Thousand/uL      Lymphocytes Absolute 0.96 Thousands/µL      Monocytes Absolute 0.27 Thousand/µL      Eosinophils Absolute 0.02 Thousand/µL      Basophils Absolute 0.07 Thousands/µL     Narrative:      This is a modified report.  Previous result was Hemogram + Auto diff on 1/29/2024 at 1246 EST    Comprehensive metabolic panel [[id number]]  (Abnormal) Collected: 01/29/24 1152    Lab Status: Final result Specimen: Blood from Arm, Right Updated: 01/29/24 1338     Sodium 131 mmol/L      Potassium 5.7 mmol/L      Chloride 89 mmol/L      CO2 21 mmol/L      ANION GAP 21 mmol/L      BUN 37 mg/dL      Creatinine 1.63 mg/dL      Glucose 1,239 mg/dL      Calcium 10.8 mg/dL      AST 14 U/L      ALT 40 U/L      Alkaline Phosphatase 149 U/L      Total Protein 7.8 g/dL      Albumin 4.4 g/dL      Total Bilirubin 0.77 mg/dL      eGFR 51 ml/min/1.73sq m     Narrative:      National Kid[employer] Disease Foundation guidelines for Chronic Kidney Disease (CKD):     Stage 1 with normal or high GFR (GFR > 90 mL/min/1.73 square meters)    Stage 2 Mild CKD (GFR = 60-89 mL/min/1.73 square meters)    Stage 3A Moderate CKD (GFR = 45-59 mL/min/1.73 square meters)    Stage 3B Moderate CKD (GFR = 30-44 mL/min/1.73 square meters)    Stage 4 Severe CKD (GFR = 15-29 mL/min/1.73 square meters)    Stage 5 End Stage CKD (GFR <15 mL/min/1.73 square meters)  Note: GFR calculation is accurate only with a steady state creatinine    Urine Microscopic [[id number]]  (Normal) Collected: 01/29/24 1236    Lab Status: Final result  Specimen: Urine, Clean Catch Updated: 01/29/24 1328     RBC, UA None Seen /hpf      WBC, UA None Seen /hpf      Epithelial Cells Occasional /hpf      Bacteria, UA None Seen /hpf     UA w Reflex to Microscopic w Reflex to Culture [[id number]]  (Abnormal) Collected: 01/29/24 1236    Lab Status: Final result Specimen: Urine, Clean Catch Updated: 01/29/24 1326     Color, UA Colorless     Clarity, UA Clear     Specific Gravity, UA 1.030     pH, UA 5.0     Leukocytes, UA Elevated glucose may cause decreased leukocyte values. See urine microscopic for UWBC result     Nitrite, UA Negative     Protein, UA Negative mg/dl      Glucose, UA >=1000 (1%) mg/dl      Ketones, UA 40 (2+) mg/dl      Urobilinogen, UA <2.0 mg/dl      Bilirubin, UA Negative     Occult Blood, UA Negative    Lipase [[id number]]  (Normal) Collected: 01/29/24 1152    Lab Status: Final result Specimen: Blood from Arm, Right Updated: 01/29/24 1317     Lipase 19 u/L     Beta Hydroxybutyrate [[id number]]  (Abnormal) Collected: 01/29/24 1239    Lab Status: Final result Specimen: Blood from Arm, Right Updated: 01/29/24 1252     BETA-HYDROXYBUTYRATE 5.3 mmol/L     Blood gas, venous [[id number]]  (Abnormal) Collected: 01/29/24 1239    Lab Status: Final result Specimen: Blood from Arm, Right Updated: 01/29/24 1248     pH, Sandip 7.281     pCO2, Sandip 42.7 mm Hg      pO2, Sandip 57.3 mm Hg      HCO3, Sandip 19.7 mmol/L      Base Excess, Sandip -6.8 mmol/L      O2 Content, Sandip 20.4 ml/dL      O2 HGB, VENOUS 83.9 %     Fingerstick Glucose (POCT) [[id number]]  (Abnormal) Collected: 01/29/24 1219    Lab Status: Final result Updated: 01/29/24 1220     POC Glucose >500 mg/dl                    No orders to display         Procedures  Procedures      ED Course                             SBIRT 22yo+      Flowsheet Row Most Recent Value   Initial Alcohol Screen: US AUDIT-C     1. How often do you have a drink containing alcohol? 0 Filed at: 01/29/2024 1242   2. How many drinks containing  alcohol do you have on a typical day you are drinking?  0 Filed at: 01/29/2024 1242   3a. Male UNDER 65: How often do you have five or more drinks on one occasion? 0 Filed at: 01/29/2024 1242   Audit-C Score 0 Filed at: 01/29/2024 1242   EVERARDO: How many times in the past year have you...    Used an illegal drug or used a prescription medication for non-medical reasons? Never Filed at: 01/29/2024 1242                  Medical Decision Making  Patient is 43M with PMH of recent admission for otomastoiditis who presents to the ED with increased urination, fatigue.    Vital signs show mild tachycardia.    History and physical exam most consistent with new onset diabetes, possibly dka.  Plan DKA labs, fluid resuscitation, insulin drip if findings consistent with DKA and no hypokalemia, admission    View ED course above for further discussion on patient workup.     All labs reviewed and utilized in the medical decision making process  All radiology studies independently viewed by me and interpreted by the radiologist.  I reviewed all testing with the patient.       Amount and/or Complexity of Data Reviewed  Labs: ordered.    Risk  OTC drugs.  Prescription drug management.  Decision regarding hospitalization.          Disposition  Final diagnoses:   Diabetic ketoacidosis without coma associated with type 2 diabetes mellitus (HCC)     Time reflects when diagnosis was documented in both MDM as applicable and the Disposition within this note       Time User Action Codes Description Comment    1/29/2024  4:13 PM Karli Vuong Add [E11.10] Diabetic ketoacidosis without coma associated with type 2 diabetes mellitus (HCC)           ED Disposition       ED Disposition   Admit    Condition   Stable    Date/Time   Mon Jan 29, 2024 1431    Comment   Case was discussed with Dr Oquendo and the patient's admission status was agreed to be Admission Status: inpatient status to the service of Dr. Oquendo .               Follow-up Information     None         Current Discharge Medication List        CONTINUE these medications which have NOT CHANGED    Details   artificial tear (LUBRIFRESH P.M.) 83-15 % ophthalmic ointment Administer to both eyes 3 (three) times a day  Qty: 3.5 g, Refills: 0    Associated Diagnoses: Mastoiditis, acute, right      ciprofloxacin-dexamethasone (CIPRODEX) otic suspension Administer 4 drops to the right ear 2 (two) times a day  Qty: 7.5 mL, Refills: 0    Associated Diagnoses: Mastoiditis, acute, right      pantoprazole (PROTONIX) 40 mg tablet Take 1 tablet (40 mg total) by mouth daily before breakfast  Qty: 30 tablet, Refills: 0    Associated Diagnoses: Mastoiditis, acute, right      predniSONE 20 mg tablet T3 tabs x 5 days, then take 2 tablets x 5 days, then take 1 tablet x 5 days.  Qty: 60 tablet, Refills: 0    Associated Diagnoses: Mastoiditis, acute, right           No discharge procedures on file.    PDMP Review       None             ED Provider  Attending physically available and evaluated Jhonny Eastman. I managed the patient along with the ED Attending.    Electronically Signed by           Wilton Silvestre MD  01/29/24 4536

## 2024-01-29 NOTE — ASSESSMENT & PLAN NOTE
Likely prerenal in the setting of hypovolemia due to DKA vs HHK.    POA creatinine 1.6, baseline 0.8- 1.0.  UA significant for glucosuria and ketonuria in the setting of DKA  Improved close to baseline  Continue IV fluids for now, continue encourage p.o. intake  Avoid nephrotoxins  I's and O's, urinary retention protocol  BMP a.m.

## 2024-01-29 NOTE — ED NOTES
Pt's POC glucose >500. Random glucose ordered and drawn as per protocol, and sent to lab. No change to insulin gtt at this time.      Sondra Thomas RN  01/29/24 3002

## 2024-01-29 NOTE — ASSESSMENT & PLAN NOTE
POA noted with mild hyperkalemia 5.7 no EKG changes  Presented with hyperkalemia in the setting of DKA- not truly hyperkalemic, more likely potassium depleted intracellular  Hyperkalemia resolved

## 2024-01-30 PROBLEM — E87.5 HYPERKALEMIA: Status: RESOLVED | Noted: 2024-01-29 | Resolved: 2024-01-30

## 2024-01-30 PROBLEM — D72.829 LEUCOCYTOSIS: Status: ACTIVE | Noted: 2024-01-30

## 2024-01-30 PROBLEM — E78.5 DYSLIPIDEMIA: Status: ACTIVE | Noted: 2024-01-30

## 2024-01-30 LAB
ALBUMIN SERPL BCP-MCNC: 3.2 G/DL (ref 3.5–5)
ALP SERPL-CCNC: 65 U/L (ref 34–104)
ALT SERPL W P-5'-P-CCNC: 31 U/L (ref 7–52)
ANION GAP SERPL CALCULATED.3IONS-SCNC: 5 MMOL/L
ANION GAP SERPL CALCULATED.3IONS-SCNC: 6 MMOL/L
ANION GAP SERPL CALCULATED.3IONS-SCNC: 8 MMOL/L
AST SERPL W P-5'-P-CCNC: 19 U/L (ref 13–39)
BILIRUB SERPL-MCNC: 0.51 MG/DL (ref 0.2–1)
BUN SERPL-MCNC: 19 MG/DL (ref 5–25)
BUN SERPL-MCNC: 19 MG/DL (ref 5–25)
BUN SERPL-MCNC: 22 MG/DL (ref 5–25)
CA-I BLD-SCNC: 1.19 MMOL/L (ref 1.12–1.32)
CALCIUM ALBUM COR SERPL-MCNC: 9 MG/DL (ref 8.3–10.1)
CALCIUM SERPL-MCNC: 8.4 MG/DL (ref 8.4–10.2)
CALCIUM SERPL-MCNC: 9.2 MG/DL (ref 8.4–10.2)
CALCIUM SERPL-MCNC: 9.4 MG/DL (ref 8.4–10.2)
CHLORIDE SERPL-SCNC: 105 MMOL/L (ref 96–108)
CHLORIDE SERPL-SCNC: 110 MMOL/L (ref 96–108)
CHLORIDE SERPL-SCNC: 111 MMOL/L (ref 96–108)
CHOLEST SERPL-MCNC: 266 MG/DL
CO2 SERPL-SCNC: 25 MMOL/L (ref 21–32)
CO2 SERPL-SCNC: 27 MMOL/L (ref 21–32)
CO2 SERPL-SCNC: 28 MMOL/L (ref 21–32)
CREAT SERPL-MCNC: 1.01 MG/DL (ref 0.6–1.3)
CREAT SERPL-MCNC: 1.04 MG/DL (ref 0.6–1.3)
CREAT SERPL-MCNC: 1.23 MG/DL (ref 0.6–1.3)
ERYTHROCYTE [DISTWIDTH] IN BLOOD BY AUTOMATED COUNT: 15.1 % (ref 11.6–15.1)
EST. AVERAGE GLUCOSE BLD GHB EST-MCNC: 275 MG/DL
GFR SERPL CREATININE-BSD FRML MDRD: 72 ML/MIN/1.73SQ M
GFR SERPL CREATININE-BSD FRML MDRD: 88 ML/MIN/1.73SQ M
GFR SERPL CREATININE-BSD FRML MDRD: 91 ML/MIN/1.73SQ M
GLUCOSE SERPL-MCNC: 103 MG/DL (ref 65–140)
GLUCOSE SERPL-MCNC: 119 MG/DL (ref 65–140)
GLUCOSE SERPL-MCNC: 123 MG/DL (ref 65–140)
GLUCOSE SERPL-MCNC: 128 MG/DL (ref 65–140)
GLUCOSE SERPL-MCNC: 152 MG/DL (ref 65–140)
GLUCOSE SERPL-MCNC: 191 MG/DL (ref 65–140)
GLUCOSE SERPL-MCNC: 197 MG/DL (ref 65–140)
GLUCOSE SERPL-MCNC: 205 MG/DL (ref 65–140)
GLUCOSE SERPL-MCNC: 214 MG/DL (ref 65–140)
GLUCOSE SERPL-MCNC: 252 MG/DL (ref 65–140)
GLUCOSE SERPL-MCNC: 276 MG/DL (ref 65–140)
GLUCOSE SERPL-MCNC: 296 MG/DL (ref 65–140)
GLUCOSE SERPL-MCNC: 337 MG/DL (ref 65–140)
GLUCOSE SERPL-MCNC: 371 MG/DL (ref 65–140)
GLUCOSE SERPL-MCNC: 384 MG/DL (ref 65–140)
GLUCOSE SERPL-MCNC: 54 MG/DL (ref 65–140)
GLUCOSE SERPL-MCNC: 95 MG/DL (ref 65–140)
GLUCOSE SERPL-MCNC: 95 MG/DL (ref 65–140)
GLUCOSE SERPL-MCNC: 96 MG/DL (ref 65–140)
HBA1C MFR BLD: 11.2 %
HCT VFR BLD AUTO: 45 % (ref 36.5–49.3)
HDLC SERPL-MCNC: 41 MG/DL
HGB BLD-MCNC: 15.4 G/DL (ref 12–17)
LDLC SERPL CALC-MCNC: 175 MG/DL (ref 0–100)
MAGNESIUM SERPL-MCNC: 2.2 MG/DL (ref 1.9–2.7)
MAGNESIUM SERPL-MCNC: 2.3 MG/DL (ref 1.9–2.7)
MCH RBC QN AUTO: 28.4 PG (ref 26.8–34.3)
MCHC RBC AUTO-ENTMCNC: 34.2 G/DL (ref 31.4–37.4)
MCV RBC AUTO: 83 FL (ref 82–98)
PHOSPHATE SERPL-MCNC: 2.7 MG/DL (ref 2.7–4.5)
PHOSPHATE SERPL-MCNC: 2.8 MG/DL (ref 2.7–4.5)
PLATELET # BLD AUTO: 214 THOUSANDS/UL (ref 149–390)
PMV BLD AUTO: 11.9 FL (ref 8.9–12.7)
POTASSIUM SERPL-SCNC: 3.3 MMOL/L (ref 3.5–5.3)
POTASSIUM SERPL-SCNC: 3.5 MMOL/L (ref 3.5–5.3)
POTASSIUM SERPL-SCNC: 3.6 MMOL/L (ref 3.5–5.3)
PROT SERPL-MCNC: 5.7 G/DL (ref 6.4–8.4)
RBC # BLD AUTO: 5.42 MILLION/UL (ref 3.88–5.62)
SODIUM SERPL-SCNC: 135 MMOL/L (ref 135–147)
SODIUM SERPL-SCNC: 145 MMOL/L (ref 135–147)
SODIUM SERPL-SCNC: 145 MMOL/L (ref 135–147)
TRIGL SERPL-MCNC: 248 MG/DL
WBC # BLD AUTO: 13.3 THOUSAND/UL (ref 4.31–10.16)

## 2024-01-30 PROCEDURE — 99222 1ST HOSP IP/OBS MODERATE 55: CPT | Performed by: STUDENT IN AN ORGANIZED HEALTH CARE EDUCATION/TRAINING PROGRAM

## 2024-01-30 PROCEDURE — 80061 LIPID PANEL: CPT

## 2024-01-30 PROCEDURE — 84100 ASSAY OF PHOSPHORUS: CPT | Performed by: NURSE PRACTITIONER

## 2024-01-30 PROCEDURE — 83036 HEMOGLOBIN GLYCOSYLATED A1C: CPT

## 2024-01-30 PROCEDURE — 85027 COMPLETE CBC AUTOMATED: CPT

## 2024-01-30 PROCEDURE — 99232 SBSQ HOSP IP/OBS MODERATE 35: CPT | Performed by: INTERNAL MEDICINE

## 2024-01-30 PROCEDURE — 80048 BASIC METABOLIC PNL TOTAL CA: CPT | Performed by: NURSE PRACTITIONER

## 2024-01-30 PROCEDURE — 80053 COMPREHEN METABOLIC PANEL: CPT | Performed by: STUDENT IN AN ORGANIZED HEALTH CARE EDUCATION/TRAINING PROGRAM

## 2024-01-30 PROCEDURE — 82948 REAGENT STRIP/BLOOD GLUCOSE: CPT

## 2024-01-30 PROCEDURE — 82330 ASSAY OF CALCIUM: CPT

## 2024-01-30 PROCEDURE — 83735 ASSAY OF MAGNESIUM: CPT | Performed by: NURSE PRACTITIONER

## 2024-01-30 RX ORDER — POTASSIUM CHLORIDE 20 MEQ/1
40 TABLET, EXTENDED RELEASE ORAL ONCE
Status: COMPLETED | OUTPATIENT
Start: 2024-01-30 | End: 2024-01-30

## 2024-01-30 RX ORDER — ENOXAPARIN SODIUM 100 MG/ML
40 INJECTION SUBCUTANEOUS
Status: DISCONTINUED | OUTPATIENT
Start: 2024-01-30 | End: 2024-02-01 | Stop reason: HOSPADM

## 2024-01-30 RX ORDER — ACETAMINOPHEN 325 MG/1
650 TABLET ORAL EVERY 8 HOURS PRN
Status: DISCONTINUED | OUTPATIENT
Start: 2024-01-30 | End: 2024-02-01

## 2024-01-30 RX ORDER — INSULIN GLARGINE 100 [IU]/ML
30 INJECTION, SOLUTION SUBCUTANEOUS
Status: DISCONTINUED | OUTPATIENT
Start: 2024-01-30 | End: 2024-02-01

## 2024-01-30 RX ORDER — SODIUM CHLORIDE, SODIUM GLUCONATE, SODIUM ACETATE, POTASSIUM CHLORIDE, MAGNESIUM CHLORIDE, SODIUM PHOSPHATE, DIBASIC, AND POTASSIUM PHOSPHATE .53; .5; .37; .037; .03; .012; .00082 G/100ML; G/100ML; G/100ML; G/100ML; G/100ML; G/100ML; G/100ML
125 INJECTION, SOLUTION INTRAVENOUS CONTINUOUS
Status: DISPENSED | OUTPATIENT
Start: 2024-01-30 | End: 2024-01-30

## 2024-01-30 RX ORDER — INSULIN LISPRO 100 [IU]/ML
10 INJECTION, SOLUTION INTRAVENOUS; SUBCUTANEOUS
Status: DISCONTINUED | OUTPATIENT
Start: 2024-01-30 | End: 2024-02-01

## 2024-01-30 RX ORDER — POLYETHYLENE GLYCOL 3350 17 G/17G
17 POWDER, FOR SOLUTION ORAL DAILY PRN
Status: DISCONTINUED | OUTPATIENT
Start: 2024-01-30 | End: 2024-02-01 | Stop reason: HOSPADM

## 2024-01-30 RX ORDER — INSULIN LISPRO 100 [IU]/ML
1-6 INJECTION, SOLUTION INTRAVENOUS; SUBCUTANEOUS
Status: DISCONTINUED | OUTPATIENT
Start: 2024-01-30 | End: 2024-01-31

## 2024-01-30 RX ADMIN — INSULIN LISPRO 10 UNITS: 100 INJECTION, SOLUTION INTRAVENOUS; SUBCUTANEOUS at 16:52

## 2024-01-30 RX ADMIN — SODIUM CHLORIDE 10.6 UNITS/HR: 9 INJECTION, SOLUTION INTRAVENOUS at 01:47

## 2024-01-30 RX ADMIN — ACETAMINOPHEN 325MG 650 MG: 325 TABLET ORAL at 13:37

## 2024-01-30 RX ADMIN — POTASSIUM CHLORIDE 40 MEQ: 1500 TABLET, EXTENDED RELEASE ORAL at 01:04

## 2024-01-30 RX ADMIN — INSULIN LISPRO 2 UNITS: 100 INJECTION, SOLUTION INTRAVENOUS; SUBCUTANEOUS at 16:53

## 2024-01-30 RX ADMIN — POTASSIUM CHLORIDE 40 MEQ: 1500 TABLET, EXTENDED RELEASE ORAL at 04:24

## 2024-01-30 RX ADMIN — DEXTROSE AND SODIUM CHLORIDE 125 ML/HR: 5; .45 INJECTION, SOLUTION INTRAVENOUS at 05:56

## 2024-01-30 RX ADMIN — SODIUM CHLORIDE, SODIUM GLUCONATE, SODIUM ACETATE, POTASSIUM CHLORIDE, MAGNESIUM CHLORIDE, SODIUM PHOSPHATE, DIBASIC, AND POTASSIUM PHOSPHATE 125 ML/HR: .53; .5; .37; .037; .03; .012; .00082 INJECTION, SOLUTION INTRAVENOUS at 08:41

## 2024-01-30 RX ADMIN — SODIUM CHLORIDE 1.5 UNITS/HR: 9 INJECTION, SOLUTION INTRAVENOUS at 04:59

## 2024-01-30 RX ADMIN — INSULIN GLARGINE 30 UNITS: 100 INJECTION, SOLUTION SUBCUTANEOUS at 13:49

## 2024-01-30 RX ADMIN — SODIUM CHLORIDE 10 UNITS/HR: 9 INJECTION, SOLUTION INTRAVENOUS at 15:41

## 2024-01-30 RX ADMIN — ENOXAPARIN SODIUM 40 MG: 40 INJECTION SUBCUTANEOUS at 08:43

## 2024-01-30 RX ADMIN — CHLORHEXIDINE GLUCONATE 15 ML: 1.2 RINSE ORAL at 08:43

## 2024-01-30 RX ADMIN — ACETAMINOPHEN 325MG 650 MG: 325 TABLET ORAL at 05:06

## 2024-01-30 RX ADMIN — SODIUM CHLORIDE, SODIUM GLUCONATE, SODIUM ACETATE, POTASSIUM CHLORIDE, MAGNESIUM CHLORIDE, SODIUM PHOSPHATE, DIBASIC, AND POTASSIUM PHOSPHATE 125 ML/HR: .53; .5; .37; .037; .03; .012; .00082 INJECTION, SOLUTION INTRAVENOUS at 16:53

## 2024-01-30 NOTE — PROGRESS NOTES
01/30/24 1200   Clinical Encounter Type   Visited With Patient   Routine Visit Introduction   Continue Visiting Yes

## 2024-01-30 NOTE — PLAN OF CARE
Problem: Nutrition/Hydration-ADULT  Goal: Nutrient/Hydration intake appropriate for improving, restoring or maintaining nutritional needs  Description: Monitor and assess patient's nutrition/hydration status for malnutrition. Collaborate with interdisciplinary team and initiate plan and interventions as ordered.  Monitor patient's weight and dietary intake as ordered or per policy. Utilize nutrition screening tool and intervene as necessary. Determine patient's food preferences and provide high-protein, high-caloric foods as appropriate.     INTERVENTIONS:  - Monitor oral intake, urinary output, labs, and treatment plans  - Assess nutrition and hydration status and recommend course of action  - Evaluate amount of meals eaten  - Assist patient with eating if necessary   - Allow adequate time for meals  - Recommend/ encourage appropriate diets, oral nutritional supplements, and vitamin/mineral supplements  - Order, calculate, and assess calorie counts as needed  - Recommend, monitor, and adjust tube feedings and TPN/PPN based on assessed needs  - Assess need for intravenous fluids  - Provide specific nutrition/hydration education as appropriate  - Include patient/family/caregiver in decisions related to nutrition  Outcome: Progressing     Problem: PAIN - ADULT  Goal: Verbalizes/displays adequate comfort level or baseline comfort level  Description: Interventions:  - Encourage patient to monitor pain and request assistance  - Assess pain using appropriate pain scale  - Administer analgesics based on type and severity of pain and evaluate response  - Implement non-pharmacological measures as appropriate and evaluate response  - Consider cultural and social influences on pain and pain management  - Notify physician/advanced practitioner if interventions unsuccessful or patient reports new pain  Outcome: Progressing     Problem: INFECTION - ADULT  Goal: Absence or prevention of progression during  hospitalization  Description: INTERVENTIONS:  - Assess and monitor for signs and symptoms of infection  - Monitor lab/diagnostic results  - Monitor all insertion sites, i.e. indwelling lines, tubes, and drains  - Monitor endotracheal if appropriate and nasal secretions for changes in amount and color  - [place] appropriate cooling/warming therapies per order  - Administer medications as ordered  - Instruct and encourage patient and family to use good hand hygiene technique  - Identify and instruct in appropriate isolation precautions for identified infection/condition  Outcome: Progressing  Goal: Absence of fever/infection during neutropenic period  Description: INTERVENTIONS:  - Monitor WBC    Outcome: Progressing     Problem: SAFETY ADULT  Goal: Patient will remain free of falls  Description: INTERVENTIONS:  - Educate patient/family on patient safety including physical limitations  - Instruct patient to call for assistance with activity   - Consult OT/PT to assist with strengthening/mobility   - Keep Call bell within reach  - Keep bed low and locked with side rails adjusted as appropriate  - Keep care items and personal belongings within reach  - Initiate and maintain comfort rounds  - Make Fall Risk Sign visible to staff  - Offer Toileting every 2 Hours, in advance of need  - Initiate/Maintain bed alarm  - Obtain necessary fall risk management equipment:   - Apply yellow socks and bracelet for high fall risk patients  - Consider moving patient to room near nurses station  Outcome: Progressing  Goal: Maintain or return to baseline ADL function  Description: INTERVENTIONS:  -  Assess patient's ability to carry out ADLs; assess patient's baseline for ADL function and identify physical deficits which impact ability to perform ADLs (bathing, care of mouth/teeth, toileting, grooming, dressing, etc.)  - Assess/evaluate cause of self-care deficits   - Assess range of motion  - Assess patient's mobility; develop plan if  impaired  - Assess patient's need for assistive devices and provide as appropriate  - Encourage maximum independence but intervene and supervise when necessary  - Involve family in performance of ADLs  - Assess for home care needs following discharge   - Consider OT consult to assist with ADL evaluation and planning for discharge  - Provide patient education as appropriate  Outcome: Progressing  Goal: Maintains/Returns to pre admission functional level  Description: INTERVENTIONS:  - Perform AM-PAC 6 Click Basic Mobility/ Daily Activity assessment daily.  - Set and communicate daily mobility goal to care team and patient/family/caregiver.   - Collaborate with rehabilitation services on mobility goals if consulted  - Perform Range of Motion 4 times a day.  - Reposition patient every 2 hours.  - Dangle patient 3 times a day  - Stand patient 3 times a day  - Ambulate patient 3 times a day  - Out of bed to chair 3 times a day   - Out of bed for meals 3 times a day  - Out of bed for toileting  - Record patient progress and toleration of activity level   Outcome: Progressing     Problem: DISCHARGE PLANNING  Goal: Discharge to home or other facility with appropriate resources  Description: INTERVENTIONS:  - Identify barriers to discharge w/patient and caregiver  - Arrange for needed discharge resources and transportation as appropriate  - Identify discharge learning needs (meds, wound care, etc.)  - Arrange for interpretive services to assist at discharge as needed  - Refer to Case Management Department for coordinating discharge planning if the patient needs post-hospital services based on physician/advanced practitioner order or complex needs related to functional status, cognitive ability, or social support system  Outcome: Progressing     Problem: Knowledge Deficit  Goal: Patient/family/caregiver demonstrates understanding of disease process, treatment plan, medications, and discharge instructions  Description:  Complete learning assessment and assess knowledge base.  Interventions:  - Provide teaching at level of understanding  - Provide teaching via preferred learning methods  Outcome: Progressing

## 2024-01-30 NOTE — ASSESSMENT & PLAN NOTE
(P) [phone]  POA 1 with history of fatigue, weakness, polyuria polydipsia blurred vision.  Weight loss proximately 30 pounds in about a month.    Blood glucose upon presentation over 1200, VBG 7.28/42/57/19.7, beta hydroxybutyrate 5.3 with anion gap of 21  Admitted DKA pathway anion gap closed around 0 300 transition to non-DKA insulin drip  Recently admitted stent mastoiditis treated with high-dose steroid and subsequently on a prednisone taper.  Family history T 2 DM mother otherwise no other family member  Concerning for new onset diabetes/HHS    Plan  Follow-up hemoglobin A1c  Continue non-DKA insulin drip will favor continue drip for 24 hrs to assess insulin requirement given new dx vs basal bolus versus 70/30 given patient has no insurance at this time. Will also will need insulin education as insulin naive  Endocrinology consulted given presumable new onset diabetes appreciate recommendations  Switch to carbohydrate controlled diet  Will need nutrition and diabetic education  Will consult case management given patient currently has no insurance at the moment will likely will need assistance with insulin affordability.

## 2024-01-30 NOTE — UTILIZATION REVIEW
"Initial Clinical Review    Admission: Date/Time/Statement:   Admission Orders (From admission, onward)       Ordered        01/29/24 1520  INPATIENT ADMISSION  Once                          Orders Placed This Encounter   Procedures    INPATIENT ADMISSION     Standing Status:   Standing     Number of Occurrences:   1     Order Specific Question:   Level of Care     Answer:   Level 1 Stepdown [13]     Order Specific Question:   Estimated length of stay     Answer:   More than 2 Midnights     Order Specific Question:   Certification     Answer:   I certify that inpatient services are medically necessary for this patient for a duration of greater than two midnights. See H&P and MD Progress Notes for additional information about the patient's course of treatment.     ED Arrival Information       Expected   -    Arrival   1/29/2024 11:33    Acuity   Emergent              Means of arrival   Wheelchair    Escorted by   Self    Service   Critical Care/ICU    Admission type   Emergency              Arrival complaint   fatigue             Chief Complaint   Patient presents with    Fatigue     Pt report dx of bells palsy in beginning of jan. And taking meds with no relief (right side affected). Pt reports increased fatigue, decrease in vision in left eye, increase in urination, decrease in appetite. X-wife at bedside reports pt appears to be \"out of it\", sluggish to respond to questions. Pt reports almost 30 pound weight loss since beginning of Jan.        Initial Presentation: 43 y.o. male presents to ED from home  with polydipsia, polyuria, weakness and blurred vision for past week. Blood sugar  1239 on  ED arrival.   Patient recently admitted for facial palsy secondary to a otomastoiditis treated with Unasyn and high dose steroids.   Given  2L  IVF  bolus in ED and  started insulin drip.  Labs  also reveal  elevated creatinine and potassium.  Need to consider  HHK given  hyperosmolar state.  Admit  Ip Stepdown LOC  with   " Diabetic  KA,   TANGELA  and hyperkalemia and plan is  insulin drip, monitor labs, endocrine consult, IVF, follow electrolytes and  I & O.            ED Triage Vitals   Temperature Pulse Respirations Blood Pressure SpO2   01/29/24 1206 01/29/24 1136 01/29/24 1136 01/29/24 1136 01/29/24 1136   (!) 97.4 °F (36.3 °C) 104 20 134/79 97 %      Temp Source Heart Rate Source Patient Position - Orthostatic VS BP Location FiO2 (%)   01/29/24 1725 01/29/24 1136 01/29/24 1136 01/29/24 1136 --   Oral Monitor Sitting Right arm       Pain Score       01/29/24 1715       No Pain          Wt Readings from Last 1 Encounters:   01/30/24 103 kg (226 lb 13.7 oz)     Additional Vital Signs:   01/30/24 0603 -- 88 17 96/51 71 94 % -- --   01/30/24 0600 -- 88 16 98/45 Abnormal  53 Abnormal  94 % -- --   01/30/24 0500 -- 88 24 Abnormal  114/74 89 96 % -- --   01/30/24 0400 98.4 °F (36.9 °C) 88 17 115/71 87 94 % None (Room air) Lying   01/30/24 0300 -- 88 15 121/67 83 95 % -- --   01/30/24 0200 -- 90 17 119/67 81 95 % -- --   01/30/24 0100 -- 88 16 110/69 81 95 % -- --   01/30/24 0000 98 °F (36.7 °C) 92 14 114/74 85 96 % None (Room air) Lying   01/29/24 2300 -- 92 21 114/64 84 95 % -- --   01/29/24 2200 -- 94 14 121/68 91 95 % -- --   01/29/24 2100 -- 90 24 Abnormal  98/74 85 96 % -- --   01/29/24 2030 -- 92 16 136/66 96 95 % -- Lying   01/29/24 2000 97.8 °F (36.6 °C) 96 29 Abnormal  -- -- 95 % None (Room air) --   01/29/24 1800 -- 94 16 139/80 98 95 % -- --   01/29/24 1725 97.4 °F (36.3 °C) Abnormal  96 16 131/76 111 95 % -- --   01/29/24 1715 -- 101 18 131/76 111 95 % None (Room air) --   01/29/24 1630 -- 96 17 135/76 97 95 % None (Room air) Lying   01/29/24 1600 -- 95 28 Abnormal  140/81 104 97 % None (Room air) Lying   01/29/24 1530 -- 103 28 Abnormal  143/67 97 95 % None (Room air) Lying   01/29/24 1500 -- 101 22 137/81 105 96 % None (Room air) Lying   01/29/24 1400 -- 91 15 136/65 92 97 % None (Room air) Lying   01/29/24 1330 -- 98 23  Abnormal  141/68 98 -- -- Lying   01/29/24 1230 -- 95 24 Abnormal  126/68 91 96 % None (Room air) Lying   01/29/24 1206 97.4 °F (36.3 °C) Abnormal  -- -- -- -- -- -- --     Pertinent Labs/Diagnostic Test Results:       Results from last 7 days   Lab Units 01/30/24  0427 01/29/24  1152   WBC Thousand/uL 13.30* 11.94*   HEMOGLOBIN g/dL 15.4 15.0   HEMATOCRIT % 45.0 42.9   PLATELETS Thousands/uL 214 226   NEUTROS ABS Thousands/µL  --  10.57*         Results from last 7 days   Lab Units 01/30/24  0427 01/30/24  0328 01/30/24  0018 01/29/24  2038 01/29/24  1749 01/29/24  1152   SODIUM mmol/L  --  145 145 147 144 131*   POTASSIUM mmol/L  --  3.6 3.5 4.3 3.9 5.7*   CHLORIDE mmol/L  --  111* 110* 106 104 89*   CO2 mmol/L  --  28 27 27 22 21   ANION GAP mmol/L  --  6 8 14 18 21   BUN mg/dL  --  19 22 25 29* 37*   CREATININE mg/dL  --  1.01 1.04 1.14 1.18 1.63*   EGFR ml/min/1.73sq m  --  91 88 79 76 51   CALCIUM mg/dL  --  9.2 9.4 9.9 10.8* 10.8*   CALCIUM, IONIZED mmol/L 1.19  --   --   --   --   --    MAGNESIUM mg/dL  --  2.2 2.3 2.6 2.8* 2.7   PHOSPHORUS mg/dL  --  2.7 2.8 4.4 5.3*  --      Results from last 7 days   Lab Units 01/29/24  1152   AST U/L 14   ALT U/L 40   ALK PHOS U/L 149*   TOTAL PROTEIN g/dL 7.8   ALBUMIN g/dL 4.4   TOTAL BILIRUBIN mg/dL 0.77     Results from last 7 days   Lab Units 01/30/24  1005 01/30/24  0805 01/30/24  0558 01/30/24  0455 01/30/24  0426 01/30/24  0302 01/30/24  0203 01/30/24  0102 01/30/24  0017 01/29/24  2309 01/29/24  2219 01/29/24  2114   POC GLUCOSE mg/dl 371* 252* 384* 119 54* 96 123 152* 197* 189* 228* 301*     Results from last 7 days   Lab Units 01/30/24  0328 01/30/24  0018 01/29/24  2038 01/29/24  1749 01/29/24  1549 01/29/24  1152   GLUCOSE RANDOM mg/dL 95 191* 329* 502* 728* 1,239*             BETA-HYDROXYBUTYRATE   Date Value Ref Range Status   01/29/2024 5.3 (H) <0.6 mmol/L Final          Results from last 7 days   Lab Units 01/29/24  1239   PH OFELIA  7.281*   PCO2 OFELIA mm Hg  42.7   PO2 OFELIA mm Hg 57.3*   HCO3 OFELIA mmol/L 19.7*   BASE EXC OFELIA mmol/L -6.8   O2 CONTENT OFELIA ml/dL 20.4   O2 HGB, VENOUS % 83.9*               Results from last 7 days   Lab Units 01/29/24  1152   LIPASE u/L 19                 Results from last 7 days   Lab Units 01/29/24  1236   CLARITY UA  Clear   COLOR UA  Colorless   SPEC GRAV UA  1.030   PH UA  5.0   GLUCOSE UA mg/dl >=1000 (1%)*   KETONES UA mg/dl 40 (2+)*   BLOOD UA  Negative   PROTEIN UA mg/dl Negative   NITRITE UA  Negative   BILIRUBIN UA  Negative   UROBILINOGEN UA (BE) mg/dl <2.0   LEUKOCYTES UA  Elevated glucose may cause decreased leukocyte values. See urine microscopic for UWBC result*   WBC UA /hpf None Seen   RBC UA /hpf None Seen   BACTERIA UA /hpf None Seen   EPITHELIAL CELLS WET PREP /hpf Occasional         ED Treatment:   Medication Administration from 01/29/2024 1133 to 01/29/2024 1714         Date/Time Order Dose Route Action Comments     01/29/2024 1343 EST multi-electrolyte (ISOLYTE-S PH 7.4) bolus 1,000 mL 0 mL Intravenous Stopped --     01/29/2024 1240 EST multi-electrolyte (ISOLYTE-S PH 7.4) bolus 1,000 mL 1,000 mL Intravenous New Bag --     01/29/2024 1345 EST multi-electrolyte (ISOLYTE-S PH 7.4) bolus 1,000 mL 1,000 mL Intravenous New Bag --     01/29/2024 1440 EST insulin regular (HumuLIN R,NovoLIN R) injection 8 Units 8 Units Intravenous Given --     01/29/2024 1442 EST insulin regular (HumuLIN R,NovoLIN R) 1 Units/mL in sodium chloride 0.9 % 100 mL infusion 10.6 Units/hr Intravenous New Bag --            Present on Admission:   Diabetic keto-acidosis (HCC)   (Resolved) Hyperkalemia   Acute kidney injury (HCC)      Admitting Diagnosis: Fatigue [R53.83]  Diabetic ketoacidosis without coma associated with type 2 diabetes mellitus (HCC) [E11.10]  Age/Sex: 43 y.o. male  Admission Orders:  Scheduled Medications:  chlorhexidine, 15 mL, Mouth/Throat, Q12H TUSHAR  enoxaparin, 40 mg, Subcutaneous, Q24H TUSHAR  polyethylene glycol, 17 g, Oral,  Daily      Continuous IV Infusions:  insulin regular (HumuLIN R,NovoLIN R) 1 Units/mL in sodium chloride 0.9 % 100 mL infusion, 0.3-21 Units/hr, Intravenous, Titrated  multi-electrolyte, 125 mL/hr, Intravenous, Continuous      PRN Meds:  acetaminophen, 650 mg, Oral, Q8H PRN        IP CONSULT TO CASE MANAGEMENT  IP CONSULT TO ENDOCRINOLOGY  IP CONSULT TO NUTRITION SERVICES    Network Utilization Review Department  ATTENTION: Please call with any questions or concerns to [phone] and carefully listen to the prompts so that you are directed to the right person. All voicemails are confidential.   For Discharge needs, contact Care Management DC Support Team at [phone] opt. 2  Send all requests for admission clinical reviews, approved or denied determinations and any other requests to dedicated fax number below belonging to the [facility] where the patient is receiving treatment. List of dedicated fax numbers for the Facilities:  FACILITY NAME UR FAX NUMBER   ADMISSION DENIALS (Administrative/Medical Necessity) [phone]   DISCHARGE SUPPORT TEAM (NETWORK) [phone]   PARENT CHILD HEALTH (Maternity/NICU/Pediatrics) [phone]   [facility] [phone]   [facility] [phone]   [facility] [phone]   [facility] [phone]   [facility] [phone]   [facility] [phone]   [facility] [phone]   [facility] [phone]   [facility] [phone]   [facility] [phone]   [facility] [phone]   [facility] [phone]

## 2024-01-30 NOTE — PLAN OF CARE
Problem: Nutrition/Hydration-ADULT  Goal: Nutrient/Hydration intake appropriate for improving, restoring or maintaining nutritional needs  Description: Monitor and assess patient's nutrition/hydration status for malnutrition. Collaborate with interdisciplinary team and initiate plan and interventions as ordered.  Monitor patient's weight and dietary intake as ordered or per policy. Utilize nutrition screening tool and intervene as necessary. Determine patient's food preferences and provide high-protein, high-caloric foods as appropriate.     INTERVENTIONS:  - Monitor oral intake, urinary output, labs, and treatment plans  - Assess nutrition and hydration status and recommend course of action  - Evaluate amount of meals eaten  - Assist patient with eating if necessary   - Allow adequate time for meals  - Recommend/ encourage appropriate diets, oral nutritional supplements, and vitamin/mineral supplements  - Order, calculate, and assess calorie counts as needed  - Recommend, monitor, and adjust tube feedings and TPN/PPN based on assessed needs  - Assess need for intravenous fluids  - Provide specific nutrition/hydration education as appropriate  - Include patient/family/caregiver in decisions related to nutrition  Outcome: Progressing     Problem: PAIN - ADULT  Goal: Verbalizes/displays adequate comfort level or baseline comfort level  Description: Interventions:  - Encourage patient to monitor pain and request assistance  - Assess pain using appropriate pain scale  - Administer analgesics based on type and severity of pain and evaluate response  - Implement non-pharmacological measures as appropriate and evaluate response  - Consider cultural and social influences on pain and pain management  - Notify physician/advanced practitioner if interventions unsuccessful or patient reports new pain  Outcome: Progressing     Problem: INFECTION - ADULT  Goal: Absence or prevention of progression during  hospitalization  Description: INTERVENTIONS:  - Assess and monitor for signs and symptoms of infection  - Monitor lab/diagnostic results  - Monitor all insertion sites, i.e. indwelling lines, tubes, and drains  - Monitor endotracheal if appropriate and nasal secretions for changes in amount and color  - [place] appropriate cooling/warming therapies per order  - Administer medications as ordered  - Instruct and encourage patient and family to use good hand hygiene technique  - Identify and instruct in appropriate isolation precautions for identified infection/condition  Outcome: Progressing  Goal: Absence of fever/infection during neutropenic period  Description: INTERVENTIONS:  - Monitor WBC    Outcome: Progressing     Problem: SAFETY ADULT  Goal: Patient will remain free of falls  Description: INTERVENTIONS:  - Educate patient/family on patient safety including physical limitations  - Instruct patient to call for assistance with activity   - Consult OT/PT to assist with strengthening/mobility   - Keep Call bell within reach  - Keep bed low and locked with side rails adjusted as appropriate  - Keep care items and personal belongings within reach  - Initiate and maintain comfort rounds  - Make Fall Risk Sign visible to staff  - Offer Toileting every 2 Hours, in advance of need  - Initiate/Maintain bed alarm  - Apply yellow socks and bracelet for high fall risk patients  - Consider moving patient to room near nurses station  Outcome: Progressing  Goal: Maintain or return to baseline ADL function  Description: INTERVENTIONS:  -  Assess patient's ability to carry out ADLs; assess patient's baseline for ADL function and identify physical deficits which impact ability to perform ADLs (bathing, care of mouth/teeth, toileting, grooming, dressing, etc.)  - Assess/evaluate cause of self-care deficits   - Assess range of motion  - Assess patient's mobility; develop plan if impaired  - Assess patient's need for assistive  devices and provide as appropriate  - Encourage maximum independence but intervene and supervise when necessary  - Involve family in performance of ADLs  - Assess for home care needs following discharge   - Consider OT consult to assist with ADL evaluation and planning for discharge  - Provide patient education as appropriate  Outcome: Progressing  Goal: Maintains/Returns to pre admission functional level  Description: INTERVENTIONS:  - Perform AM-PAC 6 Click Basic Mobility/ Daily Activity assessment daily.  - Set and communicate daily mobility goal to care team and patient/family/caregiver.   - Collaborate with rehabilitation services on mobility goals if consulted  - Out of bed for toileting  - Record patient progress and toleration of activity level   Outcome: Progressing     Problem: DISCHARGE PLANNING  Goal: Discharge to home or other facility with appropriate resources  Description: INTERVENTIONS:  - Identify barriers to discharge w/patient and caregiver  - Arrange for needed discharge resources and transportation as appropriate  - Identify discharge learning needs (meds, wound care, etc.)  - Arrange for interpretive services to assist at discharge as needed  - Refer to Case Management Department for coordinating discharge planning if the patient needs post-hospital services based on physician/advanced practitioner order or complex needs related to functional status, cognitive ability, or social support system  Outcome: Progressing     Problem: Knowledge Deficit  Goal: Patient/family/caregiver demonstrates understanding of disease process, treatment plan, medications, and discharge instructions  Description: Complete learning assessment and assess knowledge base.  Interventions:  - Provide teaching at level of understanding  - Provide teaching via preferred learning methods  Outcome: Progressing

## 2024-01-30 NOTE — CASE MANAGEMENT
Case Management Assessment & Discharge Planning Note    Patient name Jhonny Eastman  Location ICU  MRN [id number]  : 1980 Date 2024       Current Admission Date: 2024  Current Admission Diagnosis:Diabetic keto-acidosis (HCC)   Patient Active Problem List    Diagnosis Date Noted    Leucocytosis 2024    Dyslipidemia 2024    Diabetic keto-acidosis (HCC) 2024    Acute kidney injury (HCC) 2024    Otomastoiditis, acute, right 2024    Cranial nerve VII palsy 2024    Periodontal disease 2024      LOS (days): 1  Geometric Mean LOS (GMLOS) (days):   Days to GMLOS:     OBJECTIVE:  PATIENT READMITTED TO HOSPITAL  Risk of Unplanned Readmission Score: 11.87         Current admission status: Inpatient       Preferred Pharmacy:   [id number] [address]  [address] [place] [id number]  Phone: [phone] Fax: [phone]    Primary Care Provider: No primary care provider on file.    Primary Insurance:   Secondary Insurance:     ASSESSMENT:  Active Health Care Proxies       [facility] Care Representative - Other   Primary Phone: [phone] (Mobile)                 Advance Directives  Primary Contact: Stella [phone]         Readmission Root Cause  30 Day Readmission: Yes  Who directed you to return to the hospital?: Self  Did you understand whom to contact if you had questions or problems?: Yes  Were you able to get your prescriptions filled when you left the hospital?: Yes  Did you take your medications as prescribed?: Yes  Patient was readmitted due to: DKA  Action Plan: endo consulted, new onset diabetic, insulin drip    Patient Information  Admitted from:: Home  Mental Status: Alert  During Assessment patient was accompanied by: Not accompanied during assessment  Assessment information provided by:: Patient  Primary Caregiver: Self  County of Residence: [facility] city do you live  in?: [place]    Activities of Daily Living Prior to Admission  Functional Status: Independent  Completes ADLs independently?: Yes  Ambulates independently?: Yes  Does patient use assisted devices?: No         Patient Information Continued  Income Source: Employed ()  Does patient have prescription coverage?: No  Does patient receive dialysis treatments?: No  Does patient have a history of substance abuse?: No  Does patient have a history of Mental Health Diagnosis?: No         Means of Transportation  Means of Transport to Appts:: Drives Self          DISCHARGE DETAILS:    Discharge planning discussed with:: Patient        CM contacted family/caregiver?: No- see comments (declined need at this time)                            Other Referral/Resources/Interventions Provided:  Referral Comments: Pt qualifies for 100% assistance per [employer]                                                      Additional Comments: Pt confirmed he picked up medications from pharmacy after last admission. Informed him of likelihood of being on insulin after d/c. He would like to know cost of medications but believes he should be able to afford it. He is eligible for 100% assistance per [employer]. CM asked pt about status of PATHS referral. He confirmed he has been in contact with PATHS representative but has not gotten the chance to send back paperwork. He understands the importance of completing paperwork in order to get insurance. He has received financial assistance application by mail and will complete that as well. Pt has a ride home upon d/c. Will continue to follow.

## 2024-01-30 NOTE — CASE MANAGEMENT
Case Management Discharge Planning Note    Patient name Jhonny Eastman  Location ICU ICU  MRN [id number]  : 1980 Date 2024       Current Admission Date: 2024  Current Admission Diagnosis:Diabetic keto-acidosis (HCC)   Patient Active Problem List    Diagnosis Date Noted    Leucocytosis 2024    Dyslipidemia 2024    Diabetic keto-acidosis (HCC) 2024    Acute kidney injury (HCC) 2024    Otomastoiditis, acute, right 2024    Cranial nerve VII palsy 2024    Periodontal disease 2024      LOS (days): 1  Geometric Mean LOS (GMLOS) (days):   Days to GMLOS:     OBJECTIVE:  Risk of Unplanned Readmission Score: 11.87         Current admission status: Inpatient   Preferred Pharmacy:   [id number] [place] [id number] [facility]  [address] [place] [address]  Phone: [phone] Fax: [phone]    Primary Care Provider: No primary care provider on file.    Primary Insurance:   Secondary Insurance:     DISCHARGE DETAILS:                                                                                                 Additional Comments: Per chart review, PATHS referral made beginning of January. Pt still listed as self-pay. Admitted for DKA. New onset diabetes. Endo consulted. CM sent request for [employer]. CM dept will assist with medications on d/c. Will continue to follow.

## 2024-01-30 NOTE — CONSULTS
Consultation - Jhonny Eastman 43 y.o. male MRN: [id number]    Unit/Bed#: ICU 01 Encounter: [id number]      Assessment/Plan     Assessment:  This is a 43 y.o.-year-old male with diabetes with hyperglycemia unclear dx since did not have physician follow up prior. Unsure if current diabetes progressed d/t steroid use vs d/t lack of tx. Admitted for mild DKA with ?new onset diabetes. Did have slightly high BG in 200's previous hospitalization as well when started on steroids. Regardless given DKA started on insulin drip with DKA resolved now. Patient has been eating, will transition off drip to MDI. Given high insulin drip requirement for now will transition off drip per protocol rather than d/c drip in 2hrs after basal insulin    GiH6v-08.2%  Home regimen: None  Inpatient regimen: Insulin Drip      For discharge, if Lantus solostar is not the preferred basal insulin for the patient's insurance company, please substitute with Tresiba flexpen, Basaglar Kwikpen , Levemir flexpen, Toujeo solostar pen  or equivalent insulin vials at the same dose instead.      For discharge, if Humalog  Kwik pen is not the preferred mealtime insulin for the patient's insurance, please substitute with NovoLog flexpen, Fiasp  flextouch, Admelog solostar  or Apidra solostar pen or equivalent insulin vials at the same dose instead.     Please prescribe insulin pen needles, glucometer, test strips, lancets and insulin syringes (only when using insulin vials) on discharge.    Would recommend OP endocrine referral for BG management and to assess long term diabetes monitoring to see if this was simple d/t steroid induced vs how poor is his underlying baseline diabetes. Please also start insulin teaching and also request nutrition consult inpatient to discuss diabetic diet recommendations      Plan:  - start lantus 30u   - Start lispro 10u with meals   - d/c insulin drip as tolerated, transition off per protocol.     Inpatient consult to  Endocrinology  Consult performed by: Dainela Shahid MD  Consult ordered by: Linda Geronimo PA-C        CC: New onset diabetes    History of Present Illness     HPI: Jhonny Eastman is a 43 y.o. year old male with no previous hx admitted to ED for polyuria, polydipsia, weakness and BV for 1 week with BG >500 on admission. Admitted for symptomatic new onset diabetes with mild DKA AG 18, BG 1239, beta hydroxy 5.3 . A1C on admission 11.2%. Patient was started on prednisone 40mg taper dose few weeks ago for otomastoiditis/bells palsy. Was on prednisone 5mg daily PTA. Otherwise denies any other hospitalization, illness. Denies any rapid weight gain or loss. Reports does not follow any diabetic diet. DKA has now resolved. Patient started on PO diet with variable drip requirements with raise in BG when eating.     Family Hx- mother had prediabetes    Review of Systems   Constitutional:  Negative for diaphoresis, fatigue and unexpected weight change.   Respiratory:  Negative for shortness of breath.    Cardiovascular:  Negative for chest pain and palpitations.   Gastrointestinal:  Negative for constipation and diarrhea.   Endocrine: Negative for polydipsia and polyuria.       Historical Information   Past Medical History:   Diagnosis Date    Bell's palsy     Hyperkalemia      History reviewed. No pertinent surgical history.  Social History   Social History     Substance and Sexual Activity   Alcohol Use Not Currently    Comment: Social     Social History     Substance and Sexual Activity   Drug Use Never     Social History     Tobacco Use   Smoking Status Never   Smokeless Tobacco Never     Family History: History reviewed. No pertinent family history.    Meds/Allergies   Current Facility-Administered Medications   Medication Dose Route Frequency Provider Last Rate Last Admin    acetaminophen (TYLENOL) tablet 650 mg  650 mg Oral Q8H PRN Linda Geronimo PA-C   650 mg at 01/30/24 0506    enoxaparin (LOVENOX) subcutaneous  injection 40 mg  40 mg Subcutaneous Q24H [facility] Rhina Matos MD   40 mg at 01/30/24 0843    insulin regular (HumuLIN R,NovoLIN R) 1 Units/mL in sodium chloride 0.9 % 100 mL infusion  0.3-21 Units/hr Intravenous Titrated Linda Geronimo PA-C 10 mL/hr at 01/30/24 1200 10 Units/hr at 01/30/24 1200    multi-electrolyte (PLASMALYTE-A/ISOLYTE-S PH 7.4) IV solution  125 mL/hr Intravenous Continuous Rhina Matos  mL/hr at 01/30/24 0841 125 mL/hr at 01/30/24 0841    polyethylene glycol (MIRALAX) packet 17 g  17 g Oral Daily PRN Rhina Matos MD         No Known Allergies    Objective   Vitals: Blood pressure 133/78, pulse (!) 108, temperature 97.9 °F (36.6 °C), temperature source Oral, resp. rate (!) 39, height 6' (1.829 m), weight 103 kg (226 lb 13.7 oz), SpO2 96%.    Intake/Output Summary (Last 24 hours) at 1/30/2024 1244  Last data filed at 1/30/2024 1201  Gross per 24 hour   Intake 6197.05 ml   Output 123 ml   Net 6074.05 ml     Invasive Devices       Peripheral Intravenous Line  Duration             Peripheral IV 01/29/24 Distal;Right;Upper;Ventral (anterior) Arm 1 day    Peripheral IV 01/29/24 Right;Ventral (anterior) Forearm <1 day                    Physical Exam  Constitutional:       Appearance: Normal appearance. He is obese.   Cardiovascular:      Rate and Rhythm: Normal rate and regular rhythm.      Pulses: Normal pulses.   Pulmonary:      Effort: Pulmonary effort is normal.   Abdominal:      General: Abdomen is flat.      Palpations: Abdomen is soft.   Skin:     General: Skin is warm.      Capillary Refill: Capillary refill takes less than 2 seconds.   Neurological:      General: No focal deficit present.      Mental Status: He is alert.         The history was obtained from the review of the chart, patient.    Lab Results:   Results from last 7 days   Lab Units 01/30/24  0427   HEMOGLOBIN A1C % 11.2*     Lab Results   Component Value Date    WBC 13.30 (H)  "01/30/2024    HGB 15.4 01/30/2024    HCT 45.0 01/30/2024    MCV 83 01/30/2024     01/30/2024     Lab Results   Component Value Date/Time    BUN 19 01/30/2024 03:28 AM    K 3.6 01/30/2024 03:28 AM     (H) 01/30/2024 03:28 AM    CO2 28 01/30/2024 03:28 AM    CREATININE 1.01 01/30/2024 03:28 AM    AST 14 01/29/2024 11:52 AM    ALT 40 01/29/2024 11:52 AM    TP 7.8 01/29/2024 11:52 AM    ALB 4.4 01/29/2024 11:52 AM     Recent Labs     01/30/24  0328   HDL 41   TRIG 248*     No results found for: \"MICROALBUR\", \"IDHG75HHJ\"  POC Glucose (mg/dl)   Date Value   01/30/2024 276 (H)   01/30/2024 371 (H)       Imaging Studies: I have personally reviewed pertinent reports.      Portions of the record may have been created with voice recognition software.    "

## 2024-01-30 NOTE — PROGRESS NOTES
[facility]  Progress Note  Name: Jhonny Eastman I  MRN: [id number]  Unit/Bed#: ICU 01 I Date of Admission: 1/29/2024   Date of Service: 1/30/2024 I Hospital Day: 1    Assessment/Plan   * Diabetic keto-acidosis (HCC)  Assessment & Plan      (P) [id number]  POA 1 with history of fatigue, weakness, polyuria polydipsia blurred vision.  Weight loss proximately 30 pounds in about a month.    Blood glucose upon presentation over 1200, VBG 7.28/42/57/19.7, beta hydroxybutyrate 5.3 with anion gap of 21  Admitted DKA pathway anion gap closed around 0 300 transition to non-DKA insulin drip  Recently admitted stent mastoiditis treated with high-dose steroid and subsequently on a prednisone taper.  Family history T 2 DM mother otherwise no other family member  Concerning for new onset diabetes/HHS    Plan  Follow-up hemoglobin A1c  Continue non-DKA insulin drip will favor continue drip for 24 hrs to assess insulin requirement given new dx vs basal bolus versus 70/30 given patient has no insurance at this time. Will also will need insulin education as insulin naive  Endocrinology consulted given presumable new onset diabetes appreciate recommendations  Switch to carbohydrate controlled diet  Will need nutrition and diabetic education  Will consult case management given patient currently has no insurance at the moment will likely will need assistance with insulin affordability.    Acute kidney injury (HCC)  Assessment & Plan  Likely prerenal in the setting of hypovolemia due to DKA vs HHK.    POA creatinine 1.6, baseline 0.8- 1.0.  UA significant for glucosuria and ketonuria in the setting of DKA  Improved close to baseline  Continue IV fluids for now, continue encourage p.o. intake  Avoid nephrotoxins  I's and O's, urinary retention protocol  BMP a.m.       Dyslipidemia  Assessment & Plan  Lipid Panel Cholesterol 266, , HDL 41,   ASCVD 4.5%  Consider starting moderate vs high  intensity   Will favor Atovastatin 40mg once a day goal LDL will reduce around 85 which will still be above goal for Diabetic patient      Leucocytosis  Assessment & Plan  WBC 13.97  Likely in the setting of acute illness, dehydration  Patient has remained afebrile  Will monitor off abx  CBC am             Disposition: Med Surg    ICU Core Measures     A: Assess, Prevent, and Manage Pain Has pain been assessed? Yes  Need for changes to pain regimen? No   B: Both SAT/SAT  N/A   C: Choice of Sedation RASS Goal: N/A patient not on sedation  Need for changes to sedation or analgesia regimen? NA   D: Delirium CAM-ICU: Negative   E: Early Mobility  Plan for early mobility? Yes   F: Family Engagement Plan for family engagement today? Yes         Prophylaxis:  VTE Contraindicated secondary to: Ordered   Stress Ulcer  not orderedcovered bypantoprazole (PROTONIX) 40 mg tablet [[id number]] (Long-Term Med)         Significant 24hr Events     24hr events: No acute overnight events.  Patient remained on a DKA pathway, anion gap closed around 0 300 subsequently switched to non-DKA regular insulin.  Currently on IV fluids.    Patient seen at bedside endorse fatigue however much improved.  Continues with blurry vision although endorsed that has right-sided deafness but this is however his vision has improved.  Endorsed dry mouth, thirst, increased appetite otherwise denies any acute complaint.     Subjective   Review of Systems   Constitutional:  Positive for fatigue. Negative for chills and fever.   HENT:  Negative for ear pain and sore throat.    Eyes:  Positive for visual disturbance. Negative for pain.        Blurry vision   Respiratory:  Negative for cough and shortness of breath.    Cardiovascular:  Negative for chest pain, palpitations and leg swelling.   Gastrointestinal:  Negative for abdominal distention, abdominal pain and vomiting.   Genitourinary:  Negative for difficulty urinating, dysuria and hematuria.    Musculoskeletal:  Negative for arthralgias and back pain.   Skin:  Negative for color change and rash.   Neurological:  Positive for facial asymmetry. Negative for seizures, syncope, weakness, light-headedness and numbness.   All other systems reviewed and are negative.     Objective                            Vitals I/O      Most Recent Min/Max in 24hrs   Temp 97.9 °F (36.6 °C) Temp  Min: 97.4 °F (36.3 °C)  Max: 98.4 °F (36.9 °C)   Pulse 88 Pulse  Min: 88  Max: 104   Resp 17 Resp  Min: 14  Max: 29   BP 96/51 BP  Min: 96/51  Max: 143/67   O2 Sat 94 % SpO2  Min: 94 %  Max: 97 %      Intake/Output Summary (Last 24 hours) at 1/30/2024 0825  Last data filed at 1/30/2024 0600  Gross per 24 hour   Intake 4965.33 ml   Output 123 ml   Net 4842.33 ml       Diet Regular; Regular House; Consistent Carbohydrate Diet Level 2 (5 carb servings/75 grams CHO/meal)    Invasive Monitoring           Physical Exam   Physical Exam  Vitals and nursing note reviewed.   Eyes:      Extraocular Movements: Extraocular movements intact.      Pupils: Pupils are equal, round, and reactive to light.   Skin:     General: Skin is warm and dry.   HENT:      Head: Normocephalic and atraumatic.      Comments: R side facial weakness     Right Ear: Hearing normal.      Left Ear: Hearing normal.      Mouth/Throat:      Mouth: Mucous membranes are dry.   Cardiovascular:      Rate and Rhythm: Normal rate and regular rhythm.      Pulses: Normal pulses.   Musculoskeletal:         General: No swelling. Normal range of motion.      Right lower leg: No edema.      Left lower leg: No edema.   Abdominal: General: Bowel sounds are normal.      Palpations: Abdomen is soft.   Constitutional:       Appearance: He is well-developed and well-nourished. He is not ill-appearing.   Pulmonary:      Effort: Pulmonary effort is normal.      Breath sounds: Normal breath sounds.   Neurological:      General: No focal deficit present.      Mental Status: He is alert and  oriented to person, place and time.      Sensory: Sensation is intact.            Diagnostic Studies      EKG: NSR  Imaging:  I have personally reviewed pertinent reports.       Medications:  Scheduled PRN   chlorhexidine, 15 mL, Q12H TUSHAR  enoxaparin, 40 mg, Q24H TUSHAR  polyethylene glycol, 17 g, Daily      acetaminophen, 650 mg, Q8H PRN       Continuous    dextrose 5 % and sodium chloride 0.45 %, 125 mL/hr, Last Rate: 125 mL/hr (01/30/24 0556)  insulin regular (HumuLIN R,NovoLIN R) 1 Units/mL in sodium chloride 0.9 % 100 mL infusion, 0.3-21 Units/hr, Last Rate: 10 Units/hr (01/30/24 0559)         Labs:    CBC    Recent Labs     01/29/24  1152 01/30/24  0427   WBC 11.94* 13.30*   HGB 15.0 15.4   HCT 42.9 45.0    214     BMP    Recent Labs     01/30/24  0018 01/30/24  0328   SODIUM 145 145   K 3.5 3.6   * 111*   CO2 27 28   AGAP 8 6   BUN 22 19   CREATININE 1.04 1.01   CALCIUM 9.4 9.2       Coags    No recent results     Additional Electrolytes  Recent Labs     01/30/24  0018 01/30/24  0328 01/30/24  0427   MG 2.3 2.2  --    PHOS 2.8 2.7  --    CAIONIZED  --   --  1.19          Blood Gas    No recent results  Recent Labs     01/29/24  1239   PHVEN 7.281*   KZP8RSA 42.7   PO2VEN 57.3*   [id number] 19.7*   BEVEN -6.8   J5TMJVW 83.9*    LFTs  Recent Labs     01/29/24  1152   ALT 40   AST 14   ALKPHOS 149*   ALB 4.4   TBILI 0.77       Infectious  No recent results  Glucose  Recent Labs     01/29/24  1749 01/29/24  2038 01/30/24  0018 01/30/24  0328   GLUC 502* 329* 191* 95               Critical Care Time Statement: Upon my evaluation, this patient had a high probability of imminent or life-threatening deterioration due to Diabetic Ketoacidosis, which required my direct attention, intervention, and personal management.  I spent a total of 33 minutes directly providing critical care services, including interpretation of complex medical databases, complex medical decision making (to support/prevent further  life-threatening deterioration)., interpretation of hemodynamic data, and titration of continuous IV medications (drips). This time is exclusive of procedures, teaching, family meetings, and any prior time recorded by providers other than myself.      Rhina Matos MD

## 2024-01-30 NOTE — ASSESSMENT & PLAN NOTE
Lipid Panel Cholesterol 266, , HDL 41,   ASCVD 4.5%  Consider starting moderate vs high intensity   Will favor Atovastatin 40mg once a day goal LDL will reduce around 85 which will still be above goal for Diabetic patient

## 2024-01-30 NOTE — PROGRESS NOTES
Pastoral Care Progress Note    2024  Patient: Jhonny Eastman : 1980  Admission Date & Time: 2024 1133  MRN: [id number] [facility]: [id number]                     Chaplaincy Interventions Utilized:   Empowerment: Clarified, confirmed, or reviewed information from treatment team     Exploration: Explored spiritual needs & resources    Collaboration: Facilitated respect for spiritual/cultural practice during hospitalization    Relationship Building: Cultivated a relationship of care and support and Listened empathically    Chaplaincy Outcomes Achieved:  Expressed gratitude

## 2024-01-30 NOTE — ASSESSMENT & PLAN NOTE
WBC 13.97  Likely in the setting of acute illness, dehydration  Patient has remained afebrile  Will monitor off abx  CBC am

## 2024-01-31 LAB
ANION GAP SERPL CALCULATED.3IONS-SCNC: 10 MMOL/L
BUN SERPL-MCNC: 13 MG/DL (ref 5–25)
CALCIUM SERPL-MCNC: 8.1 MG/DL (ref 8.4–10.2)
CHLORIDE SERPL-SCNC: 106 MMOL/L (ref 96–108)
CO2 SERPL-SCNC: 23 MMOL/L (ref 21–32)
CREAT SERPL-MCNC: 1.01 MG/DL (ref 0.6–1.3)
CREAT UR-MCNC: 90 MG/DL
ERYTHROCYTE [DISTWIDTH] IN BLOOD BY AUTOMATED COUNT: 15.5 % (ref 11.6–15.1)
GFR SERPL CREATININE-BSD FRML MDRD: 91 ML/MIN/1.73SQ M
GLUCOSE SERPL-MCNC: 151 MG/DL (ref 65–140)
GLUCOSE SERPL-MCNC: 165 MG/DL (ref 65–140)
GLUCOSE SERPL-MCNC: 186 MG/DL (ref 65–140)
GLUCOSE SERPL-MCNC: 193 MG/DL (ref 65–140)
GLUCOSE SERPL-MCNC: 269 MG/DL (ref 65–140)
GLUCOSE SERPL-MCNC: 288 MG/DL (ref 65–140)
GLUCOSE SERPL-MCNC: 308 MG/DL (ref 65–140)
HCT VFR BLD AUTO: 40 % (ref 36.5–49.3)
HGB BLD-MCNC: 13.3 G/DL (ref 12–17)
MCH RBC QN AUTO: 28.3 PG (ref 26.8–34.3)
MCHC RBC AUTO-ENTMCNC: 33.3 G/DL (ref 31.4–37.4)
MCV RBC AUTO: 85 FL (ref 82–98)
MICROALBUMIN UR-MCNC: 9.5 MG/L
MICROALBUMIN/CREAT 24H UR: 11 MG/G CREATININE (ref 0–30)
PLATELET # BLD AUTO: 175 THOUSANDS/UL (ref 149–390)
PMV BLD AUTO: 12.7 FL (ref 8.9–12.7)
POTASSIUM SERPL-SCNC: 3.3 MMOL/L (ref 3.5–5.3)
RBC # BLD AUTO: 4.7 MILLION/UL (ref 3.88–5.62)
SODIUM SERPL-SCNC: 139 MMOL/L (ref 135–147)
WBC # BLD AUTO: 8.69 THOUSAND/UL (ref 4.31–10.16)

## 2024-01-31 PROCEDURE — 82948 REAGENT STRIP/BLOOD GLUCOSE: CPT

## 2024-01-31 PROCEDURE — 82570 ASSAY OF URINE CREATININE: CPT | Performed by: INTERNAL MEDICINE

## 2024-01-31 PROCEDURE — 80048 BASIC METABOLIC PNL TOTAL CA: CPT | Performed by: INTERNAL MEDICINE

## 2024-01-31 PROCEDURE — 82043 UR ALBUMIN QUANTITATIVE: CPT | Performed by: INTERNAL MEDICINE

## 2024-01-31 PROCEDURE — 85027 COMPLETE CBC AUTOMATED: CPT | Performed by: INTERNAL MEDICINE

## 2024-01-31 PROCEDURE — 99232 SBSQ HOSP IP/OBS MODERATE 35: CPT | Performed by: INTERNAL MEDICINE

## 2024-01-31 RX ORDER — KETOROLAC TROMETHAMINE 30 MG/ML
15 INJECTION, SOLUTION INTRAMUSCULAR; INTRAVENOUS ONCE
Status: COMPLETED | OUTPATIENT
Start: 2024-01-31 | End: 2024-01-31

## 2024-01-31 RX ORDER — INSULIN LISPRO 100 [IU]/ML
1-6 INJECTION, SOLUTION INTRAVENOUS; SUBCUTANEOUS
Status: DISCONTINUED | OUTPATIENT
Start: 2024-01-31 | End: 2024-02-01 | Stop reason: HOSPADM

## 2024-01-31 RX ORDER — ATORVASTATIN CALCIUM 40 MG/1
40 TABLET, FILM COATED ORAL
Status: DISCONTINUED | OUTPATIENT
Start: 2024-01-31 | End: 2024-02-01 | Stop reason: HOSPADM

## 2024-01-31 RX ORDER — INSULIN LISPRO 100 [IU]/ML
2 INJECTION, SOLUTION INTRAVENOUS; SUBCUTANEOUS ONCE
Status: COMPLETED | OUTPATIENT
Start: 2024-01-31 | End: 2024-01-31

## 2024-01-31 RX ADMIN — INSULIN LISPRO 2 UNITS: 100 INJECTION, SOLUTION INTRAVENOUS; SUBCUTANEOUS at 03:36

## 2024-01-31 RX ADMIN — ACETAMINOPHEN 325MG 650 MG: 325 TABLET ORAL at 21:26

## 2024-01-31 RX ADMIN — ACETAMINOPHEN 325MG 650 MG: 325 TABLET ORAL at 00:13

## 2024-01-31 RX ADMIN — INSULIN LISPRO 4 UNITS: 100 INJECTION, SOLUTION INTRAVENOUS; SUBCUTANEOUS at 21:33

## 2024-01-31 RX ADMIN — INSULIN LISPRO 10 UNITS: 100 INJECTION, SOLUTION INTRAVENOUS; SUBCUTANEOUS at 17:04

## 2024-01-31 RX ADMIN — INSULIN GLARGINE 30 UNITS: 100 INJECTION, SOLUTION SUBCUTANEOUS at 08:32

## 2024-01-31 RX ADMIN — INSULIN LISPRO 10 UNITS: 100 INJECTION, SOLUTION INTRAVENOUS; SUBCUTANEOUS at 12:08

## 2024-01-31 RX ADMIN — INSULIN LISPRO 10 UNITS: 100 INJECTION, SOLUTION INTRAVENOUS; SUBCUTANEOUS at 08:31

## 2024-01-31 RX ADMIN — INSULIN LISPRO 4 UNITS: 100 INJECTION, SOLUTION INTRAVENOUS; SUBCUTANEOUS at 17:05

## 2024-01-31 RX ADMIN — KETOROLAC TROMETHAMINE 15 MG: 30 INJECTION, SOLUTION INTRAMUSCULAR; INTRAVENOUS at 03:36

## 2024-01-31 RX ADMIN — INSULIN LISPRO 3 UNITS: 100 INJECTION, SOLUTION INTRAVENOUS; SUBCUTANEOUS at 12:08

## 2024-01-31 RX ADMIN — ATORVASTATIN CALCIUM 40 MG: 40 TABLET, FILM COATED ORAL at 17:58

## 2024-01-31 RX ADMIN — INSULIN LISPRO 1 UNITS: 100 INJECTION, SOLUTION INTRAVENOUS; SUBCUTANEOUS at 08:30

## 2024-01-31 RX ADMIN — ENOXAPARIN SODIUM 40 MG: 40 INJECTION SUBCUTANEOUS at 08:31

## 2024-01-31 NOTE — ASSESSMENT & PLAN NOTE
Lab Results   Component Value Date    HGBA1C 11.2 (H) 01/30/2024       Recent Labs     01/31/24  0604 01/31/24  0700 01/31/24  1144 01/31/24  1636   POCGLU 165* 151* 269* 308*     The patient's acidosis has resolved.  His glycemic control is improving.

## 2024-01-31 NOTE — PLAN OF CARE
Problem: Nutrition/Hydration-ADULT  Goal: Nutrient/Hydration intake appropriate for improving, restoring or maintaining nutritional needs  Description: Monitor and assess patient's nutrition/hydration status for malnutrition. Collaborate with interdisciplinary team and initiate plan and interventions as ordered.  Monitor patient's weight and dietary intake as ordered or per policy. Utilize nutrition screening tool and intervene as necessary. Determine patient's food preferences and provide high-protein, high-caloric foods as appropriate.     INTERVENTIONS:  - Monitor oral intake, urinary output, labs, and treatment plans  - Assess nutrition and hydration status and recommend course of action  - Evaluate amount of meals eaten  - Assist patient with eating if necessary   - Allow adequate time for meals  - Recommend/ encourage appropriate diets, oral nutritional supplements, and vitamin/mineral supplements  - Order, calculate, and assess calorie counts as needed  - Recommend, monitor, and adjust tube feedings and TPN/PPN based on assessed needs  - Assess need for intravenous fluids  - Provide specific nutrition/hydration education as appropriate  - Include patient/family/caregiver in decisions related to nutrition  1/30/2024 2327 by Leeann Crabtree RN  Outcome: Progressing  1/30/2024 2327 by Leeann Crabtree RN  Outcome: Progressing     Problem: PAIN - ADULT  Goal: Verbalizes/displays adequate comfort level or baseline comfort level  Description: Interventions:  - Encourage patient to monitor pain and request assistance  - Assess pain using appropriate pain scale  - Administer analgesics based on type and severity of pain and evaluate response  - Implement non-pharmacological measures as appropriate and evaluate response  - Consider cultural and social influences on pain and pain management  - Notify physician/advanced practitioner if interventions unsuccessful or patient reports new pain  1/30/2024 2327 by Leeann DICKENS  NATACHA Crabtree  Outcome: Progressing  1/30/2024 2327 by Leeann Crabtree RN  Outcome: Progressing     Problem: INFECTION - ADULT  Goal: Absence or prevention of progression during hospitalization  Description: INTERVENTIONS:  - Assess and monitor for signs and symptoms of infection  - Monitor lab/diagnostic results  - Monitor all insertion sites, i.e. indwelling lines, tubes, and drains  - Monitor endotracheal if appropriate and nasal secretions for changes in amount and color  - [place] appropriate cooling/warming therapies per order  - Administer medications as ordered  - Instruct and encourage patient and family to use good hand hygiene technique  - Identify and instruct in appropriate isolation precautions for identified infection/condition  1/30/2024 2327 by Leeann Crabtree RN  Outcome: Progressing  1/30/2024 2327 by Leeann Crabtree RN  Outcome: Progressing     Problem: SAFETY ADULT  Goal: Patient will remain free of falls  Description: INTERVENTIONS:  - Educate patient/family on patient safety including physical limitations  - Instruct patient to call for assistance with activity   - Consult OT/PT to assist with strengthening/mobility   - Keep Call bell within reach  - Keep bed low and locked with side rails adjusted as appropriate  - Keep care items and personal belongings within reach  - Initiate and maintain comfort rounds  - Make Fall Risk Sign visible to staff  - Offer Toileting every 2 Hours, in advance of need  - Initiate/Maintain bed alarm  - Obtain necessary fall risk management equipment:   - Apply yellow socks and bracelet for high fall risk patients  - Consider moving patient to room near nurses station  1/30/2024 2327 by Leeann Crabtree RN  Outcome: Progressing  1/30/2024 2327 by Leeann Crabtree RN  Outcome: Progressing  Goal: Maintain or return to baseline ADL function  Description: INTERVENTIONS:  -  Assess patient's ability to carry out ADLs; assess patient's baseline for ADL function and  identify physical deficits which impact ability to perform ADLs (bathing, care of mouth/teeth, toileting, grooming, dressing, etc.)  - Assess/evaluate cause of self-care deficits   - Assess range of motion  - Assess patient's mobility; develop plan if impaired  - Assess patient's need for assistive devices and provide as appropriate  - Encourage maximum independence but intervene and supervise when necessary  - Involve family in performance of ADLs  - Assess for home care needs following discharge   - Consider OT consult to assist with ADL evaluation and planning for discharge  - Provide patient education as appropriate  1/30/2024 2327 by Leeann Crabtree RN  Outcome: Progressing  1/30/2024 2327 by Leeann Crabtree RN  Outcome: Progressing  Goal: Maintains/Returns to pre admission functional level  Description: INTERVENTIONS:  - Perform AM-PAC 6 Click Basic Mobility/ Daily Activity assessment daily.  - Set and communicate daily mobility goal to care team and patient/family/caregiver.   - Collaborate with rehabilitation services on mobility goals if consulted  - Perform Range of Motion 4 times a day.  - Reposition patient every 2 hours.  - Dangle patient 3 times a day  - Stand patient 3 times a day  - Ambulate patient 3 times a day  - Out of bed to chair 3 times a day   - Out of bed for meals 3 times a day  - Out of bed for toileting  - Record patient progress and toleration of activity level   1/30/2024 2327 by Leeann Crabtree RN  Outcome: Progressing  1/30/2024 2327 by Leeann Crabtree RN  Outcome: Progressing     Problem: DISCHARGE PLANNING  Goal: Discharge to home or other facility with appropriate resources  Description: INTERVENTIONS:  - Identify barriers to discharge w/patient and caregiver  - Arrange for needed discharge resources and transportation as appropriate  - Identify discharge learning needs (meds, wound care, etc.)  - Arrange for interpretive services to assist at discharge as needed  - Refer to  Case Management Department for coordinating discharge planning if the patient needs post-hospital services based on physician/advanced practitioner order or complex needs related to functional status, cognitive ability, or social support system  1/30/2024 2327 by Leeann Crabtree RN  Outcome: Progressing  1/30/2024 2327 by Leeann Crabtree RN  Outcome: Progressing     Problem: Knowledge Deficit  Goal: Patient/family/caregiver demonstrates understanding of disease process, treatment plan, medications, and discharge instructions  Description: Complete learning assessment and assess knowledge base.  Interventions:  - Provide teaching at level of understanding  - Provide teaching via preferred learning methods  1/30/2024 2327 by Leeann Crabtree RN  Outcome: Progressing  1/30/2024 2327 by Leeann Crabtree RN  Outcome: Progressing

## 2024-01-31 NOTE — PROGRESS NOTES
[facility]  Progress Note  Name: Jhonny Eastman I  MRN: [id number]  Unit/Bed#: [place] [address] -02 I Date of Admission: 1/29/2024   Date of Service: 1/31/2024 I Hospital Day: 2    Assessment/Plan   Dyslipidemia  Assessment & Plan  Start atorvastatin.    Acute kidney injury (HCC)  Assessment & Plan  Secondary to hypovolemia, improved    * Diabetic keto-acidosis (HCC)  Assessment & Plan  Lab Results   Component Value Date    HGBA1C 11.2 (H) 01/30/2024       Recent Labs     01/31/24  0604 01/31/24  0700 01/31/24  1144 01/31/24  1636   POCGLU 165* 151* 269* 308*     The patient's acidosis has resolved.  His glycemic control is improving.         The patient is doing better.  He did review aspects of the diabetic diet with the dietitians.  He is practicing administering insulin.  I anticipate he will be discharged tomorrow.        Subjective:  Patient is feeling pretty well.  He is a bit fatigued but has less polyuria and polydipsia.  He has no chest pain or shortness of breath.  He denies abdominal pain, nausea, or vomiting.    Physical Exam:   Temp:  [97.3 °F (36.3 °C)-98 °F (36.7 °C)] 97.8 °F (36.6 °C)  HR:  [71-86] 81  Resp:  [16-17] 16  BP: (107-121)/(58-70) 108/65    Gen: Well-developed, obese, in no distress.  Neck: Supple.  No lymphadenopathy, goiter, or bruit.  Heart: Regular rhythm.  No murmur, gallop, or rub.  Lungs: Clear to auscultation percussion.  No wheezing, rales, or rhonchi.  Abd: Soft with active bowel sounds.  No mass, tenderness, organomegaly.  Extremities: No clubbing, cyanosis, or edema.  No calf tenderness.  Neuro: Alert and oriented.  No focal sign.  Skin: Warm and dry.      LABS:   CBC:   Lab Results   Component Value Date    WBC 8.69 01/31/2024    HGB 13.3 01/31/2024    HCT 40.0 01/31/2024    MCV 85 01/31/2024     01/31/2024    RBC 4.70 01/31/2024    MCH 28.3 01/31/2024    MCHC 33.3 01/31/2024    RDW 15.5 (H) 01/31/2024    MPV 12.7 01/31/2024   , CMP:   Lab  Results   Component Value Date    SODIUM 139 01/31/2024    K 3.3 (L) 01/31/2024     01/31/2024    CO2 23 01/31/2024    BUN 13 01/31/2024    CREATININE 1.01 01/31/2024    CALCIUM 8.1 (L) 01/31/2024    EGFR 91 01/31/2024           VTE Pharmacologic Prophylaxis: Enoxaparin (Lovenox)  VTE Mechanical Prophylaxis: sequential compression device

## 2024-01-31 NOTE — CASE MANAGEMENT
Case Management Discharge Planning Note    Patient name Jhonny Eastman  Location [place] [address] /[place] 2 M* MRN [id number]  : 1980 Date 2024       Current Admission Date: 2024  Current Admission Diagnosis:Diabetic keto-acidosis (HCC)   Patient Active Problem List    Diagnosis Date Noted    Leucocytosis 2024    Dyslipidemia 2024    Diabetic keto-acidosis (HCC) 2024    Acute kidney injury (HCC) 2024    Otomastoiditis, acute, right 2024    Cranial nerve VII palsy 2024    Periodontal disease 2024      LOS (days): 2  Geometric Mean LOS (GMLOS) (days):   Days to GMLOS:     OBJECTIVE:  Risk of Unplanned Readmission Score: 8.27         Current admission status: Inpatient   Preferred Pharmacy:   [id number] [address]  [address] [place] [id number]  Phone: [phone] Fax: [phone]    Primary Care Provider: No primary care provider on file.    Primary Insurance:   Secondary Insurance:     DISCHARGE DETAILS:                                          Other Referral/Resources/Interventions Provided:  Interventions: PCP, Outpatient , Other (Specify) (WING Uriostegui contacted for post hospital new pt appointment with OPSW/CM referral due to medication assistance need and close f/u for new DM.  Endcrinology contact information placed on AVS)

## 2024-01-31 NOTE — PLAN OF CARE
Problem: Nutrition/Hydration-ADULT  Goal: Nutrient/Hydration intake appropriate for improving, restoring or maintaining nutritional needs  Description: Monitor and assess patient's nutrition/hydration status for malnutrition. Collaborate with interdisciplinary team and initiate plan and interventions as ordered.  Monitor patient's weight and dietary intake as ordered or per policy. Utilize nutrition screening tool and intervene as necessary. Determine patient's food preferences and provide high-protein, high-caloric foods as appropriate.     INTERVENTIONS:  - Monitor oral intake, urinary output, labs, and treatment plans  - Assess nutrition and hydration status and recommend course of action  - Evaluate amount of meals eaten  - Assist patient with eating if necessary   - Allow adequate time for meals  - Recommend/ encourage appropriate diets, oral nutritional supplements, and vitamin/mineral supplements  - Order, calculate, and assess calorie counts as needed  - Recommend, monitor, and adjust tube feedings and TPN/PPN based on assessed needs  - Assess need for intravenous fluids  - Provide specific nutrition/hydration education as appropriate  - Include patient/family/caregiver in decisions related to nutrition  Outcome: Progressing     Problem: PAIN - ADULT  Goal: Verbalizes/displays adequate comfort level or baseline comfort level  Description: Interventions:  - Encourage patient to monitor pain and request assistance  - Assess pain using appropriate pain scale  - Administer analgesics based on type and severity of pain and evaluate response  - Implement non-pharmacological measures as appropriate and evaluate response  - Consider cultural and social influences on pain and pain management  - Notify physician/advanced practitioner if interventions unsuccessful or patient reports new pain  Outcome: Progressing     Problem: INFECTION - ADULT  Goal: Absence or prevention of progression during  hospitalization  Description: INTERVENTIONS:  - Assess and monitor for signs and symptoms of infection  - Monitor lab/diagnostic results  - Monitor all insertion sites, i.e. indwelling lines, tubes, and drains  - Monitor endotracheal if appropriate and nasal secretions for changes in amount and color  - [place] appropriate cooling/warming therapies per order  - Administer medications as ordered  - Instruct and encourage patient and family to use good hand hygiene technique  - Identify and instruct in appropriate isolation precautions for identified infection/condition  Outcome: Progressing     Problem: SAFETY ADULT  Goal: Patient will remain free of falls  Description: INTERVENTIONS:  - Educate patient/family on patient safety including physical limitations  - Instruct patient to call for assistance with activity   - Consult OT/PT to assist with strengthening/mobility   - Keep Call bell within reach  - Keep bed low and locked with side rails adjusted as appropriate  - Keep care items and personal belongings within reach  - Initiate and maintain comfort rounds  - Make Fall Risk Sign visible to staff  - Offer Toileting every 2 Hours, in advance of need  - Initiate/Maintain bed alarm  - Obtain necessary fall risk management equipment:   - Apply yellow socks and bracelet for high fall risk patients  - Consider moving patient to room near nurses station  Outcome: Progressing  Goal: Maintain or return to baseline ADL function  Description: INTERVENTIONS:  -  Assess patient's ability to carry out ADLs; assess patient's baseline for ADL function and identify physical deficits which impact ability to perform ADLs (bathing, care of mouth/teeth, toileting, grooming, dressing, etc.)  - Assess/evaluate cause of self-care deficits   - Assess range of motion  - Assess patient's mobility; develop plan if impaired  - Assess patient's need for assistive devices and provide as appropriate  - Encourage maximum independence but  intervene and supervise when necessary  - Involve family in performance of ADLs  - Assess for home care needs following discharge   - Consider OT consult to assist with ADL evaluation and planning for discharge  - Provide patient education as appropriate  Outcome: Progressing  Goal: Maintains/Returns to pre admission functional level  Description: INTERVENTIONS:  - Perform AM-PAC 6 Click Basic Mobility/ Daily Activity assessment daily.  - Set and communicate daily mobility goal to care team and patient/family/caregiver.   - Collaborate with rehabilitation services on mobility goals if consulted  - Perform Range of Motion 4 times a day.  - Reposition patient every 2 hours.  - Dangle patient 3 times a day  - Stand patient 3 times a day  - Ambulate patient 3 times a day  - Out of bed to chair 3 times a day   - Out of bed for meals 3 times a day  - Out of bed for toileting  - Record patient progress and toleration of activity level   Outcome: Progressing     Problem: DISCHARGE PLANNING  Goal: Discharge to home or other facility with appropriate resources  Description: INTERVENTIONS:  - Identify barriers to discharge w/patient and caregiver  - Arrange for needed discharge resources and transportation as appropriate  - Identify discharge learning needs (meds, wound care, etc.)  - Arrange for interpretive services to assist at discharge as needed  - Refer to Case Management Department for coordinating discharge planning if the patient needs post-hospital services based on physician/advanced practitioner order or complex needs related to functional status, cognitive ability, or social support system  Outcome: Progressing     Problem: Knowledge Deficit  Goal: Patient/family/caregiver demonstrates understanding of disease process, treatment plan, medications, and discharge instructions  Description: Complete learning assessment and assess knowledge base.  Interventions:  - Provide teaching at level of understanding  - Provide  teaching via preferred learning methods  Outcome: Progressing

## 2024-02-01 ENCOUNTER — TELEPHONE (OUTPATIENT)
Dept: FAMILY MEDICINE CLINIC | Facility: CLINIC | Age: 44
End: 2024-02-01

## 2024-02-01 VITALS
TEMPERATURE: 97.7 F | OXYGEN SATURATION: 94 % | HEART RATE: 92 BPM | SYSTOLIC BLOOD PRESSURE: 147 MMHG | WEIGHT: 251.77 LBS | BODY MASS INDEX: 34.1 KG/M2 | HEIGHT: 72 IN | RESPIRATION RATE: 16 BRPM | DIASTOLIC BLOOD PRESSURE: 101 MMHG

## 2024-02-01 LAB
GLUCOSE SERPL-MCNC: 233 MG/DL (ref 65–140)
GLUCOSE SERPL-MCNC: 269 MG/DL (ref 65–140)
GLUCOSE SERPL-MCNC: 296 MG/DL (ref 65–140)
HCV AB SER QL: NORMAL
HIV 1+2 AB+HIV1 P24 AG SERPL QL IA: NORMAL
HIV 2 AB SERPL QL IA: NORMAL
HIV1 AB SERPL QL IA: NORMAL
HIV1 P24 AG SERPL QL IA: NORMAL

## 2024-02-01 PROCEDURE — 87389 HIV-1 AG W/HIV-1&-2 AB AG IA: CPT | Performed by: INTERNAL MEDICINE

## 2024-02-01 PROCEDURE — 86803 HEPATITIS C AB TEST: CPT | Performed by: INTERNAL MEDICINE

## 2024-02-01 PROCEDURE — 99232 SBSQ HOSP IP/OBS MODERATE 35: CPT | Performed by: STUDENT IN AN ORGANIZED HEALTH CARE EDUCATION/TRAINING PROGRAM

## 2024-02-01 PROCEDURE — 82948 REAGENT STRIP/BLOOD GLUCOSE: CPT

## 2024-02-01 PROCEDURE — 99239 HOSP IP/OBS DSCHRG MGMT >30: CPT | Performed by: INTERNAL MEDICINE

## 2024-02-01 RX ORDER — INSULIN GLARGINE 100 [IU]/ML
40 INJECTION, SOLUTION SUBCUTANEOUS
Qty: 15 ML | Refills: 0 | Status: SHIPPED | OUTPATIENT
Start: 2024-02-01

## 2024-02-01 RX ORDER — INSULIN LISPRO 100 [IU]/ML
15 INJECTION, SOLUTION INTRAVENOUS; SUBCUTANEOUS
Qty: 15 ML | Refills: 0 | Status: SHIPPED | OUTPATIENT
Start: 2024-02-01 | End: 2024-02-01

## 2024-02-01 RX ORDER — INSULIN GLARGINE 100 [IU]/ML
40 INJECTION, SOLUTION SUBCUTANEOUS
Qty: 15 ML | Refills: 0 | Status: SHIPPED | OUTPATIENT
Start: 2024-02-01 | End: 2024-02-01

## 2024-02-01 RX ORDER — ATORVASTATIN CALCIUM 40 MG/1
40 TABLET, FILM COATED ORAL
Qty: 30 TABLET | Refills: 0 | Status: SHIPPED | OUTPATIENT
Start: 2024-02-01

## 2024-02-01 RX ORDER — GLUCOSAMINE HCL/CHONDROITIN SU 500-400 MG
CAPSULE ORAL
Qty: 200 EACH | Refills: 0 | Status: SHIPPED | OUTPATIENT
Start: 2024-02-01

## 2024-02-01 RX ORDER — BLOOD SUGAR DIAGNOSTIC
STRIP MISCELLANEOUS
Qty: 200 EACH | Refills: 0 | Status: SHIPPED | OUTPATIENT
Start: 2024-02-01

## 2024-02-01 RX ORDER — BLOOD-GLUCOSE METER
KIT MISCELLANEOUS
Qty: 1 KIT | Refills: 0 | Status: SHIPPED | OUTPATIENT
Start: 2024-02-01

## 2024-02-01 RX ORDER — INSULIN LISPRO 100 [IU]/ML
13 INJECTION, SOLUTION INTRAVENOUS; SUBCUTANEOUS
Status: DISCONTINUED | OUTPATIENT
Start: 2024-02-01 | End: 2024-02-01 | Stop reason: HOSPADM

## 2024-02-01 RX ORDER — PEN NEEDLE, DIABETIC 32GX 5/32"
NEEDLE, DISPOSABLE MISCELLANEOUS
Qty: 100 EACH | Refills: 0 | Status: SHIPPED | OUTPATIENT
Start: 2024-02-01

## 2024-02-01 RX ORDER — MINERAL OIL AND PETROLATUM 150; 830 MG/G; MG/G
OINTMENT OPHTHALMIC 3 TIMES DAILY
Start: 2024-02-01 | End: 2024-03-02

## 2024-02-01 RX ORDER — ATORVASTATIN CALCIUM 40 MG/1
40 TABLET, FILM COATED ORAL
Qty: 30 TABLET | Refills: 0 | Status: SHIPPED | OUTPATIENT
Start: 2024-02-01 | End: 2024-02-01

## 2024-02-01 RX ORDER — ACETAMINOPHEN 325 MG/1
650 TABLET ORAL EVERY 6 HOURS PRN
Status: DISCONTINUED | OUTPATIENT
Start: 2024-02-01 | End: 2024-02-01 | Stop reason: HOSPADM

## 2024-02-01 RX ORDER — KETOROLAC TROMETHAMINE 30 MG/ML
15 INJECTION, SOLUTION INTRAMUSCULAR; INTRAVENOUS ONCE
Qty: 1 ML | Refills: 0 | Status: COMPLETED | OUTPATIENT
Start: 2024-02-01 | End: 2024-02-01

## 2024-02-01 RX ORDER — LANCETS 33 GAUGE
EACH MISCELLANEOUS
Qty: 200 EACH | Refills: 0 | Status: SHIPPED | OUTPATIENT
Start: 2024-02-01

## 2024-02-01 RX ORDER — INSULIN LISPRO 100 [IU]/ML
15 INJECTION, SOLUTION INTRAVENOUS; SUBCUTANEOUS
Qty: 15 ML | Refills: 0 | Status: SHIPPED | OUTPATIENT
Start: 2024-02-01

## 2024-02-01 RX ORDER — INSULIN GLARGINE 100 [IU]/ML
40 INJECTION, SOLUTION SUBCUTANEOUS
Status: DISCONTINUED | OUTPATIENT
Start: 2024-02-02 | End: 2024-02-01 | Stop reason: HOSPADM

## 2024-02-01 RX ADMIN — INSULIN LISPRO 4 UNITS: 100 INJECTION, SOLUTION INTRAVENOUS; SUBCUTANEOUS at 12:03

## 2024-02-01 RX ADMIN — INSULIN LISPRO 10 UNITS: 100 INJECTION, SOLUTION INTRAVENOUS; SUBCUTANEOUS at 08:00

## 2024-02-01 RX ADMIN — INSULIN LISPRO 3 UNITS: 100 INJECTION, SOLUTION INTRAVENOUS; SUBCUTANEOUS at 08:00

## 2024-02-01 RX ADMIN — ENOXAPARIN SODIUM 40 MG: 40 INJECTION SUBCUTANEOUS at 08:00

## 2024-02-01 RX ADMIN — ACETAMINOPHEN 325MG 650 MG: 325 TABLET ORAL at 03:53

## 2024-02-01 RX ADMIN — INSULIN LISPRO 13 UNITS: 100 INJECTION, SOLUTION INTRAVENOUS; SUBCUTANEOUS at 12:04

## 2024-02-01 RX ADMIN — KETOROLAC TROMETHAMINE 15 MG: 30 INJECTION, SOLUTION INTRAMUSCULAR; INTRAVENOUS at 01:03

## 2024-02-01 RX ADMIN — INSULIN GLARGINE 30 UNITS: 100 INJECTION, SOLUTION SUBCUTANEOUS at 08:00

## 2024-02-01 NOTE — PLAN OF CARE
Problem: Nutrition/Hydration-ADULT  Goal: Nutrient/Hydration intake appropriate for improving, restoring or maintaining nutritional needs  Description: Monitor and assess patient's nutrition/hydration status for malnutrition. Collaborate with interdisciplinary team and initiate plan and interventions as ordered.  Monitor patient's weight and dietary intake as ordered or per policy. Utilize nutrition screening tool and intervene as necessary. Determine patient's food preferences and provide high-protein, high-caloric foods as appropriate.     INTERVENTIONS:  - Monitor oral intake, urinary output, labs, and treatment plans  - Assess nutrition and hydration status and recommend course of action  - Evaluate amount of meals eaten  - Assist patient with eating if necessary   - Allow adequate time for meals  - Recommend/ encourage appropriate diets, oral nutritional supplements, and vitamin/mineral supplements  - Order, calculate, and assess calorie counts as needed  - Recommend, monitor, and adjust tube feedings and TPN/PPN based on assessed needs  - Assess need for intravenous fluids  - Provide specific nutrition/hydration education as appropriate  - Include patient/family/caregiver in decisions related to nutrition  2/1/2024 0325 by Fior Felix  Outcome: Progressing  2/1/2024 0325 by Fior Felix  Outcome: Progressing     Problem: PAIN - ADULT  Goal: Verbalizes/displays adequate comfort level or baseline comfort level  Description: Interventions:  - Encourage patient to monitor pain and request assistance  - Assess pain using appropriate pain scale  - Administer analgesics based on type and severity of pain and evaluate response  - Implement non-pharmacological measures as appropriate and evaluate response  - Consider cultural and social influences on pain and pain management  - Notify physician/advanced practitioner if interventions unsuccessful or patient reports new pain  2/1/2024 0325 by Fior  Elvira  Outcome: Progressing  2/1/2024 0325 by Fior Felix  Outcome: Progressing     Problem: INFECTION - ADULT  Goal: Absence or prevention of progression during hospitalization  Description: INTERVENTIONS:  - Assess and monitor for signs and symptoms of infection  - Monitor lab/diagnostic results  - Monitor all insertion sites, i.e. indwelling lines, tubes, and drains  - Monitor endotracheal if appropriate and nasal secretions for changes in amount and color  - [place] appropriate cooling/warming therapies per order  - Administer medications as ordered  - Instruct and encourage patient and family to use good hand hygiene technique  - Identify and instruct in appropriate isolation precautions for identified infection/condition  2/1/2024 0325 by Fior Felix  Outcome: Progressing  2/1/2024 0325 by Fior Felix  Outcome: Progressing     Problem: SAFETY ADULT  Goal: Patient will remain free of falls  Description: INTERVENTIONS:  - Educate patient/family on patient safety including physical limitations  - Instruct patient to call for assistance with activity   - Consult OT/PT to assist with strengthening/mobility   - Keep Call bell within reach  - Keep bed low and locked with side rails adjusted as appropriate  - Keep care items and personal belongings within reach  - Initiate and maintain comfort rounds  - Make Fall Risk Sign visible to staff  - Offer Toileting every 2 Hours, in advance of need  - Initiate/Maintain bed alarm  - Obtain necessary fall risk management equipment:   - Apply yellow socks and bracelet for high fall risk patients  - Consider moving patient to room near nurses station  2/1/2024 0325 by Fior Felix  Outcome: Progressing  2/1/2024 0325 by Fior Felix  Outcome: Progressing  Goal: Maintain or return to baseline ADL function  Description: INTERVENTIONS:  -  Assess patient's ability to carry out ADLs; assess patient's baseline for ADL function and identify physical deficits  which impact ability to perform ADLs (bathing, care of mouth/teeth, toileting, grooming, dressing, etc.)  - Assess/evaluate cause of self-care deficits   - Assess range of motion  - Assess patient's mobility; develop plan if impaired  - Assess patient's need for assistive devices and provide as appropriate  - Encourage maximum independence but intervene and supervise when necessary  - Involve family in performance of ADLs  - Assess for home care needs following discharge   - Consider OT consult to assist with ADL evaluation and planning for discharge  - Provide patient education as appropriate  2/1/2024 0325 by Fior Felix  Outcome: Progressing  2/1/2024 0325 by Fior Felix  Outcome: Progressing  Goal: Maintains/Returns to pre admission functional level  Description: INTERVENTIONS:  - Perform AM-PAC 6 Click Basic Mobility/ Daily Activity assessment daily.  - Set and communicate daily mobility goal to care team and patient/family/caregiver.   - Collaborate with rehabilitation services on mobility goals if consulted  - Perform Range of Motion 4 times a day.  - Reposition patient every 2 hours.  - Dangle patient 3 times a day  - Stand patient 3 times a day  - Ambulate patient 3 times a day  - Out of bed to chair 3 times a day   - Out of bed for meals 3 times a day  - Out of bed for toileting  - Record patient progress and toleration of activity level   2/1/2024 0325 by Fior Felix  Outcome: Progressing  2/1/2024 0325 by Fior Felix  Outcome: Progressing     Problem: DISCHARGE PLANNING  Goal: Discharge to home or other facility with appropriate resources  Description: INTERVENTIONS:  - Identify barriers to discharge w/patient and caregiver  - Arrange for needed discharge resources and transportation as appropriate  - Identify discharge learning needs (meds, wound care, etc.)  - Arrange for interpretive services to assist at discharge as needed  - Refer to Case Management Department for coordinating  discharge planning if the patient needs post-hospital services based on physician/advanced practitioner order or complex needs related to functional status, cognitive ability, or social support system  2/1/2024 0325 by Fior Felix  Outcome: Progressing  2/1/2024 0325 by Fior Felix  Outcome: Progressing     Problem: Knowledge Deficit  Goal: Patient/family/caregiver demonstrates understanding of disease process, treatment plan, medications, and discharge instructions  Description: Complete learning assessment and assess knowledge base.  Interventions:  - Provide teaching at level of understanding  - Provide teaching via preferred learning methods  2/1/2024 0325 by Fior Felix  Outcome: Progressing  2/1/2024 0325 by Fior Felix  Outcome: Progressing

## 2024-02-01 NOTE — CASE MANAGEMENT
Case Management Discharge Planning Note    Patient name Jhonny Eastman  Location [place] [address] /South 2 M* MRN [id number]  : 1980 Date 2024       Current Admission Date: 2024  Current Admission Diagnosis:Diabetic keto-acidosis (HCC)   Patient Active Problem List    Diagnosis Date Noted    Leucocytosis 2024    Dyslipidemia 2024    Diabetic keto-acidosis (HCC) 2024    Acute kidney injury (HCC) 2024    Otomastoiditis, acute, right 2024    Cranial nerve VII palsy 2024    Periodontal disease 2024      LOS (days): 3  Geometric Mean LOS (GMLOS) (days):   Days to GMLOS:     OBJECTIVE:  Risk of Unplanned Readmission Score: 9.49         Current admission status: Inpatient   Preferred Pharmacy:   "[id number] [address] [address]  Phone: [phone] Fax: [phone]     [facility] [address]  Phone: [phone] Fax: [phone]    Primary Care Provider: Veda Scott    Primary Insurance:   Secondary Insurance:     DISCHARGE DETAILS:                                                    Treatment Team Recommendation: Home  Discharge Destination Plan:: Home                                         Additional Comments: [facility] does not have insulins available until Friday- checked [facility] who confirmed having both insulins available at cost of $80.23 (including atorvastatin) which pt is able to afford.  Requested to attending to resend meds to [facility] along with paper scripts for Glucometer, test strips and lancets as pt will obtain these items from [facility] due to ongoing cost.  [facility] and to back scripts out of system so can be reordered.  RN aware of same.

## 2024-02-01 NOTE — DISCHARGE SUMMARY
Discharge Summary - Jhonny Eastman, 1980, [id number]        Admission Date: 1/29/2024  Discharge Date: 2/1/2024    Discharge Diagnosis:   1.  Diabetic ketoacidosis  2.  Acute kidney injury secondary to hypovolemia  3.  Dyslipidemia  4.  Obesity    Consulting Physicians:  1.  Dr. Shahid, endocrinology    Procedures Performed:   None    HPI: The patient is a 43-year-old man who came to the emergency room with a 1 week history of polydipsia polyuria and generalized weakness.  His glucose was found to be 1239.  His beta hydroxybutyrate was positive.  He was admitted to ICU for further care.    Hospital Course: The patient was admitted to the ICU and vigorously resuscitated with intravenous fluid.  He was started on an insulin infusion.  With these measures his glucose improved and his acidosis resolved.  He was converted to subcutaneous insulin.  He was evaluated by endocrinology who recommended institution of basal bolus therapy.  The patient was still somewhat hyperglycemic at the time of discharge but his insulin was increased on the day of discharge.  The patient was seen by the dietitians and given advice regarding a diabetic diet and weight control.    The patient was noted to have mild acute kidney injury at the time of admission.  This resolved with intravenous fluid and control of his glucose.    The patient was found to have an LDL of 175.  He was started on atorvastatin 40 mg daily.  I discussed the importance of cholesterol control with the patient in detail.    The patient is somewhat obese.  He was counseled regarding weight loss.    On the day of discharge the patient was feeling quite well.  Vital signs were stable.  Lungs were clear.  Cardiac exam revealed a regular rhythm.  The abdomen was soft and nontender.  There was no edema.  Neurologic evaluation revealed the patient to be alert and oriented.  No focal sign was present.    Disposition: The patient was discharged home on February 1, 2024.  He  will follow a diabetic diet.  His activity will be as tolerated.  He was asked to establish a primary care relationship at [facility].  He will also be seen by endocrinology in the near future for further management.    Discharge instructions/Information to patient and family:   See after visit summary for information provided to patient and family.      Provisions for Follow-Up Care:  See after visit summary for information related to follow-up care and any pertinent home health orders.      Planned Readmission: No    Discharge Statement   I spent 40 minutes discharging the patient. This time was spent on the day of discharge. I had direct contact with the patient on the day of discharge.     Discharge Medications:  See after visit summary for reconciled discharge medications provided to patient and family.

## 2024-02-01 NOTE — PLAN OF CARE
Problem: Nutrition/Hydration-ADULT  Goal: Nutrient/Hydration intake appropriate for improving, restoring or maintaining nutritional needs  Description: Monitor and assess patient's nutrition/hydration status for malnutrition. Collaborate with interdisciplinary team and initiate plan and interventions as ordered.  Monitor patient's weight and dietary intake as ordered or per policy. Utilize nutrition screening tool and intervene as necessary. Determine patient's food preferences and provide high-protein, high-caloric foods as appropriate.     INTERVENTIONS:  - Monitor oral intake, urinary output, labs, and treatment plans  - Assess nutrition and hydration status and recommend course of action  - Evaluate amount of meals eaten  - Assist patient with eating if necessary   - Allow adequate time for meals  - Recommend/ encourage appropriate diets, oral nutritional supplements, and vitamin/mineral supplements  - Order, calculate, and assess calorie counts as needed  - Recommend, monitor, and adjust tube feedings and TPN/PPN based on assessed needs  - Assess need for intravenous fluids  - Provide specific nutrition/hydration education as appropriate  - Include patient/family/caregiver in decisions related to nutrition  Outcome: Progressing     Problem: PAIN - ADULT  Goal: Verbalizes/displays adequate comfort level or baseline comfort level  Description: Interventions:  - Encourage patient to monitor pain and request assistance  - Assess pain using appropriate pain scale  - Administer analgesics based on type and severity of pain and evaluate response  - Implement non-pharmacological measures as appropriate and evaluate response  - Consider cultural and social influences on pain and pain management  - Notify physician/advanced practitioner if interventions unsuccessful or patient reports new pain  Outcome: Progressing     Problem: INFECTION - ADULT  Goal: Absence or prevention of progression during  hospitalization  Description: INTERVENTIONS:  - Assess and monitor for signs and symptoms of infection  - Monitor lab/diagnostic results  - Monitor all insertion sites, i.e. indwelling lines, tubes, and drains  - Monitor endotracheal if appropriate and nasal secretions for changes in amount and color  - [place] appropriate cooling/warming therapies per order  - Administer medications as ordered  - Instruct and encourage patient and family to use good hand hygiene technique  - Identify and instruct in appropriate isolation precautions for identified infection/condition  Outcome: Progressing     Problem: SAFETY ADULT  Goal: Patient will remain free of falls  Description: INTERVENTIONS:  - Educate patient/family on patient safety including physical limitations  - Instruct patient to call for assistance with activity   - Consult OT/PT to assist with strengthening/mobility   - Keep Call bell within reach  - Keep bed low and locked with side rails adjusted as appropriate  - Keep care items and personal belongings within reach  - Initiate and maintain comfort rounds  - Make Fall Risk Sign visible to staff  - Offer Toileting every 2 Hours, in advance of need  - Initiate/Maintain bed alarm  - Obtain necessary fall risk management equipment:   - Apply yellow socks and bracelet for high fall risk patients  - Consider moving patient to room near nurses station  Outcome: Progressing  Goal: Maintain or return to baseline ADL function  Description: INTERVENTIONS:  -  Assess patient's ability to carry out ADLs; assess patient's baseline for ADL function and identify physical deficits which impact ability to perform ADLs (bathing, care of mouth/teeth, toileting, grooming, dressing, etc.)  - Assess/evaluate cause of self-care deficits   - Assess range of motion  - Assess patient's mobility; develop plan if impaired  - Assess patient's need for assistive devices and provide as appropriate  - Encourage maximum independence but  intervene and supervise when necessary  - Involve family in performance of ADLs  - Assess for home care needs following discharge   - Consider OT consult to assist with ADL evaluation and planning for discharge  - Provide patient education as appropriate  Outcome: Progressing  Goal: Maintains/Returns to pre admission functional level  Description: INTERVENTIONS:  - Perform AM-PAC 6 Click Basic Mobility/ Daily Activity assessment daily.  - Set and communicate daily mobility goal to care team and patient/family/caregiver.   - Collaborate with rehabilitation services on mobility goals if consulted  - Perform Range of Motion 4 times a day.  - Reposition patient every 2 hours.  - Dangle patient 3 times a day  - Stand patient 3 times a day  - Ambulate patient 3 times a day  - Out of bed to chair 3 times a day   - Out of bed for meals 3 times a day  - Out of bed for toileting  - Record patient progress and toleration of activity level   Outcome: Progressing     Problem: DISCHARGE PLANNING  Goal: Discharge to home or other facility with appropriate resources  Description: INTERVENTIONS:  - Identify barriers to discharge w/patient and caregiver  - Arrange for needed discharge resources and transportation as appropriate  - Identify discharge learning needs (meds, wound care, etc.)  - Arrange for interpretive services to assist at discharge as needed  - Refer to Case Management Department for coordinating discharge planning if the patient needs post-hospital services based on physician/advanced practitioner order or complex needs related to functional status, cognitive ability, or social support system  Outcome: Progressing     Problem: Knowledge Deficit  Goal: Patient/family/caregiver demonstrates understanding of disease process, treatment plan, medications, and discharge instructions  Description: Complete learning assessment and assess knowledge base.  Interventions:  - Provide teaching at level of understanding  - Provide  teaching via preferred learning methods  Outcome: Progressing

## 2024-02-01 NOTE — TELEPHONE ENCOUNTER
02/01/24    Spoke to Pt     Informed Pt the reason of my call     Pt is currently adm in the hospital.     Stated that he will contact office to guanaco his f/u and establish care with a pcp as a NP         If Pt contact office, please assist Pt with scheduling a NP & Hospital [facility]    Appt Type: NP    Appt Note: Adm 01/29/24 - (PLEASE TAKE A LOOK TO SEE WHEN PT WAS DC)

## 2024-02-01 NOTE — NURSING NOTE
Patient given education on taking his BG, demonstrated lancet use under supervision of RN. Patient also given education on insulin administration, hyper/hypoglycemia, and insulin regimens. Patient demonstrated drawing up insulin syringe and administering insulin to himself under supervision of RN. All patient questions answered at this time.

## 2024-02-01 NOTE — TELEPHONE ENCOUNTER
----- Message from Marce Singh RN sent at 1/31/2024  5:59 AM EST -----  Regarding: NEW DM in need of an appt/ OPSW/CM  Pt is a new DM without a PCP- will need a new pt/hospital follow up appt. If you could please reach out to the pt to schedule it would be appreciated.  I have also asked for an OPSW/CM to see pt as he needs medication assistance being he has no insurance as well as close medical follow up.  PATHS was done previously and he needs to follow up with them.      Thank you  Marce Singh RN [facility]

## 2024-02-06 NOTE — TELEPHONE ENCOUNTER
02/06/24    first attempt to contact patient. no answer    Left detailed message       If pt contact office please assist pt with scheduling a NP ED F/U [facility].    APPT TYPE: NP   APPT NOTE: NP 44 y/o (Adm 01/29/24 - 02/01/24) f/u Diabetic keto-acidosis

## 2024-02-09 NOTE — TELEPHONE ENCOUNTER
02/09/24    second FINAL attempt to contact patient. no answer    Left detailed message         If pt contact office please assist pt with scheduling a NP ED F/U [facility].     APPT TYPE: NP   APPT NOTE: NP 44 y/o (Adm 01/29/24 - 02/01/24) f/u Diabetic keto-acidosis       NOTE: LETTER SENT TO CONTACT OFFICE AND [facility] NP APPT

## 2024-02-19 ENCOUNTER — OFFICE VISIT (OUTPATIENT)
Dept: FAMILY MEDICINE CLINIC | Facility: CLINIC | Age: 44
End: 2024-02-19

## 2024-02-19 VITALS
WEIGHT: 249 LBS | RESPIRATION RATE: 16 BRPM | SYSTOLIC BLOOD PRESSURE: 120 MMHG | HEIGHT: 72 IN | HEART RATE: 95 BPM | OXYGEN SATURATION: 95 % | BODY MASS INDEX: 33.72 KG/M2 | DIASTOLIC BLOOD PRESSURE: 70 MMHG

## 2024-02-19 DIAGNOSIS — E11.9 DIABETES MELLITUS, NEW ONSET (HCC): Primary | ICD-10-CM

## 2024-02-19 DIAGNOSIS — Z78.9 NEED FOR FOLLOW-UP BY SOCIAL WORKER: Primary | ICD-10-CM

## 2024-02-19 DIAGNOSIS — E78.5 DYSLIPIDEMIA: ICD-10-CM

## 2024-02-19 DIAGNOSIS — N17.9 ACUTE KIDNEY INJURY (HCC): ICD-10-CM

## 2024-02-19 DIAGNOSIS — Z71.89 COMPLEX CARE COORDINATION: ICD-10-CM

## 2024-02-19 DIAGNOSIS — G51.0 CRANIAL NERVE VII PALSY: ICD-10-CM

## 2024-02-19 PROBLEM — E11.10 DIABETIC KETO-ACIDOSIS (HCC): Status: RESOLVED | Noted: 2024-01-29 | Resolved: 2024-02-19

## 2024-02-19 PROCEDURE — 99204 OFFICE O/P NEW MOD 45 MIN: CPT | Performed by: FAMILY MEDICINE

## 2024-02-19 RX ORDER — INSULIN LISPRO 100 [IU]/ML
15 INJECTION, SOLUTION INTRAVENOUS; SUBCUTANEOUS
Qty: 15 ML | Refills: 1 | Status: SHIPPED | OUTPATIENT
Start: 2024-02-19 | End: 2024-02-20 | Stop reason: SDUPTHER

## 2024-02-19 RX ORDER — METFORMIN HYDROCHLORIDE 500 MG/1
1000 TABLET, EXTENDED RELEASE ORAL
Qty: 180 TABLET | Refills: 1 | Status: SHIPPED | OUTPATIENT
Start: 2024-02-19 | End: 2024-02-20 | Stop reason: SDUPTHER

## 2024-02-19 RX ORDER — INSULIN GLARGINE 100 [IU]/ML
40 INJECTION, SOLUTION SUBCUTANEOUS
Qty: 15 ML | Refills: 1 | Status: SHIPPED | OUTPATIENT
Start: 2024-02-19 | End: 2024-02-20 | Stop reason: SDUPTHER

## 2024-02-19 NOTE — ASSESSMENT & PLAN NOTE
Problem: At Risk for Falls  Goal: # Patient does not fall  Outcome: Outcome Met, Continue evaluating goal progress toward completion     Problem: At Risk for Injury Due to Fall  Goal: # Patient does not fall  Outcome: Outcome Met, Continue evaluating goal progress toward completion     Problem: VTE, Risk for  Goal: # No s/s of VTE  Outcome: Outcome Met, Continue evaluating goal progress toward completion     Problem: VTE (Actual)  Goal: # Verbalizes understanding of VTE and treatment plan  Description: Document education using the patient education activity.   Outcome: Outcome Met, Continue evaluating goal progress toward completion     Problem: Pain  Goal: #Acceptable pain level achieved/maintained at rest using NRS/Faces  Description: This goal is used for patients who can self-report.  Acceptable means the level is at or below the identified comfort/function goal.  Outcome: Outcome Met, Continue evaluating goal progress toward completion      Symptoms have resolved with systemic steroid

## 2024-02-19 NOTE — ASSESSMENT & PLAN NOTE
Reviewed labs recent   Continue atorvastatin 40 mg  Recommend low-cholesterol diet  Monitor lipid panel

## 2024-02-19 NOTE — PROGRESS NOTES
Name: Jhonny Eastman      : 1980      MRN: [id number]  Encounter Provider: William Lucas MD  Encounter Date: 2024   [facility]    Assessment & Plan     1. Diabetes mellitus, new onset (HCC)  Assessment & Plan:    Lab Results   Component Value Date    HGBA1C 11.2 (H) 2024   Patient recently admitted for new onset diabetes with DKA  He was recently prescribed steroid for Bell's palsy  He was started on fluids and insulin drip and DKA has resolved  He has no prior diagnosis of diabetes  He does have a family history of type 2 diabetes  He was started on basal bolus insulin  Will check C-peptide/antiglycemic antibody for type 1 diabetes  Initiate metformin 500 mg twice daily  Low carbohydrate meal plan discussed with patient  Continue statin  Follow-up: Urine microalbumin creatinine ratio  Recommend yearly ophthalmology and podiatry evaluation          Orders:  -     metFORMIN (GLUCOPHAGE-XR) 500 mg 24 hr tablet; Take 2 tablets (1,000 mg total) by mouth daily with breakfast  -     Ambulatory referral to Clinical Pharmacy - only for departments with embedded clinical pharmacists; Future; Expected date: 2024  -     Ambulatory referral to Diabetic Education - use to refer for diabetes group classes, individual diabetes education, medical nutrition therapy, device training; Future; Expected date: 2024  -     Glutamic acid decarboxylase; Future  -     C-peptide; Future  -     Anti-islet cell antibody; Future  -     Ambulatory Referral to Ophthalmology; Future  -     Ambulatory referral to Social work care management program; Future; Expected date: 2024  -     Insulin Glargine Solostar (Lantus SoloStar) 100 UNIT/ML SOPN; Inject 0.4 mL (40 Units total) under the skin daily at bedtime  -     insulin lispro (HumaLOG KwikPen) 100 units/mL injection pen; Inject 15 Units under the skin 3 (three) times a day with meals    2.  Cranial nerve VII palsy  Assessment & Plan:  Symptoms have resolved with systemic steroid      3. Acute kidney injury (HCC)  Assessment & Plan:  Likely secondary to hypovolemia  TANGELA resolved with fluid resuscitation      4. Dyslipidemia  Assessment & Plan:  Reviewed labs recent   Continue atorvastatin 40 mg  Recommend low-cholesterol diet  Monitor lipid panel             Subjective     43-year-old male with recently diagnosed diabetes who presents today to \[facility]\"" care.  Patient was recently admitted to the hospital for DKA.  Patient reports that he has been experiencing polyuria, polydipsia, weight loss and brain fog for a few weeks before presenting to the hospital.  He was recently diagnosed with Bell's palsy and he was put on high dosage of steroid.  Patient was treated for DKA with insulin and fluids.  He feels much better.  He was discharged on long-acting and short acting insulin which he has been using regularly.  He was only given 30-day supply.  He has not been checking his sugars at home regularly.  He reports that his blood sugar machine is not very effective.  He is unsure whether he has insurance coverage at the moment.  He does report that he has a private medical insurance            Review of Systems   Constitutional:  Negative for chills, diaphoresis, fatigue and fever.   Eyes:  Negative for visual disturbance.   Respiratory:  Negative for shortness of breath.    Cardiovascular:  Negative for chest pain and palpitations.   Gastrointestinal:  Negative for abdominal pain, diarrhea, nausea and vomiting.   Endocrine: Negative for polydipsia, polyphagia and polyuria.   Musculoskeletal:  Negative for arthralgias.   Skin:  Negative for rash.   Neurological:  Negative for dizziness, seizures, syncope, facial asymmetry, speech difficulty and light-headedness.   Psychiatric/Behavioral:  Negative for confusion.        Past Medical History:   Diagnosis Date    Bell's palsy     Hyperkalemia       History reviewed. No pertinent surgical history.  Family History   Problem Relation Age of Onset    Diabetes Mother      Social History     Socioeconomic History    Marital status: Single     Spouse name: None    Number of children: None    Years of education: None    Highest education level: None   Occupational History    None   Tobacco Use    Smoking status: Former     Current packs/day: 1.00     Types: Cigarettes    Smokeless tobacco: Never   Vaping Use    Vaping status: Never Used   Substance and Sexual Activity    Alcohol use: Not Currently     Comment: Social    Drug use: Never    Sexual activity: Yes     Partners: Female   Other Topics Concern    None   Social History Narrative    None     Social Determinants of Health     Financial Resource Strain: Low Risk  (2/19/2024)    Overall Financial Resource Strain (CARDIA)     Difficulty of Paying Living Expenses: Not very hard   Food Insecurity: No Food Insecurity (2/19/2024)    Hunger Vital Sign     Worried About Running Out of Food in the Last Year: Never true     Ran Out of Food in the Last Year: Never true   Transportation Needs: No Transportation Needs (2/19/2024)    PRAPARE - Transportation     Lack of Transportation (Medical): No     Lack of Transportation (Non-Medical): No   Physical Activity: Not on file   Stress: Not on file   Social Connections: Not on file   Intimate Partner Violence: Not on file   Housing Stability: Not on file     Current Outpatient Medications on File Prior to Visit   Medication Sig    atorvastatin (LIPITOR) 40 mg tablet Take 1 tablet (40 mg total) by mouth daily with dinner    insulin lispro (HumaLOG KwikPen) 100 units/mL injection pen Inject 15 Units under the skin 3 (three) times a day with meals    [DISCONTINUED] Insulin Glargine Solostar (Lantus SoloStar) 100 UNIT/ML SOPN Inject 0.4 mL (40 Units total) under the skin daily at bedtime    [DISCONTINUED] insulin lispro (HumaLOG KwikPen) 100 units/mL injection pen Inject 15  Units under the skin 3 (three) times a day with meals    Alcohol Swabs 70 % PADS May substitute brand based on insurance coverage. Check glucose ACHS.    artificial tear (LUBRIFRESH P.M.) 83-15 % ophthalmic ointment Administer to both eyes 3 (three) times a day As Needed    Blood Glucose Monitoring Suppl (OneTouch Verio Reflect) w/Device KIT May substitute brand based on insurance coverage. Check glucose ACHS.    glucose blood (OneTouch Verio) test strip May substitute brand based on insurance coverage. Check glucose ACHS.    Insulin Glargine Solostar (Basaglar KwikPen) 100 UNIT/ML SOPN Inject 0.4 mL (40 Units total) under the skin daily at bedtime    Insulin Pen Needle (BD Pen Needle Ceci 2nd Gen) 32G X 4 MM MISC For use with insulin pen. Pharmacy may dispense brand covered by insurance.    Insulin Pen Needle (BD Pen Needle Ceci 2nd Gen) 32G X 4 MM MISC For use with insulin pen. Pharmacy may dispense brand covered by insurance.    OneTouch Delica Lancets 33G MISC May substitute brand based on insurance coverage. Check glucose ACHS.    pantoprazole (PROTONIX) 40 mg tablet Take 1 tablet (40 mg total) by mouth daily before breakfast     No Known Allergies    There is no immunization history on file for this patient.    Objective     /70 (BP Location: Right arm, Patient Position: Sitting)   Pulse 95   Resp 16   Ht 6' (1.829 m)   Wt 113 kg (249 lb)   SpO2 95%   BMI 33.77 kg/m²     Diabetic Foot Exam    Patient's shoes and socks removed.    Right Foot/Ankle   Right Foot Inspection  Skin Exam: skin normal and skin intact. No dry skin, no warmth, no callus, no erythema, no maceration, no abnormal color, no pre-ulcer, no ulcer and no callus.     Toe Exam: No swelling, no tenderness, erythema and  no right toe deformity    Sensory   Proprioception: intact  Monofilament testing: intact    Vascular  Capillary refills: < 3 seconds  The right DP pulse is 2+. The right PT pulse is 2+.     Left Foot/Ankle  Left Foot  Inspection  Skin Exam: skin normal and skin intact. No dry skin, no warmth, no erythema, no maceration, normal color, no pre-ulcer, no ulcer and no callus.     Toe Exam: No swelling, no tenderness, no erythema and no left toe deformity.     Sensory   Proprioception: intact  Monofilament testing: intact    Vascular  Capillary refills: < 3 seconds  The left DP pulse is 2+. The left PT pulse is 2+.     Assign Risk Category  No deformity present  No loss of protective sensation  No weak pulses  Risk: 0        Physical Exam  Constitutional:       General: He is not in acute distress.     Appearance: Normal appearance. He is well-developed. He is obese. He is not ill-appearing, toxic-appearing or diaphoretic.   HENT:      Head: Normocephalic and atraumatic.      Right Ear: External ear normal.      Left Ear: External ear normal.      Nose: Nose normal.      Mouth/Throat:      Pharynx: No oropharyngeal exudate.   Eyes:      General: No scleral icterus.        Right eye: No discharge.         Left eye: No discharge.      Extraocular Movements: Extraocular movements intact.      Conjunctiva/sclera: Conjunctivae normal.   Cardiovascular:      Rate and Rhythm: Normal rate and regular rhythm.      Pulses: no weak pulses.           Dorsalis pedis pulses are 2+ on the right side and 2+ on the left side.        Posterior tibial pulses are 2+ on the right side and 2+ on the left side.      Heart sounds: Normal heart sounds. No murmur heard.     No friction rub. No gallop.   Pulmonary:      Effort: Pulmonary effort is normal. No respiratory distress.      Breath sounds: Normal breath sounds. No stridor. No wheezing or rhonchi.   Abdominal:      General: Bowel sounds are normal. There is no distension.      Palpations: Abdomen is soft. There is no mass.      Tenderness: There is no abdominal tenderness. There is no guarding.      Hernia: No hernia is present.   Musculoskeletal:         General: Normal range of motion.      Cervical  back: Normal range of motion.      Right lower leg: No edema.      Left lower leg: No edema.   Feet:      Right foot:      Skin integrity: No ulcer, skin breakdown, erythema, warmth, callus or dry skin.      Left foot:      Skin integrity: No ulcer, skin breakdown, erythema, warmth, callus or dry skin.   Skin:     General: Skin is warm.      Capillary Refill: Capillary refill takes less than 2 seconds.      Findings: No lesion or rash.   Neurological:      General: No focal deficit present.      Mental Status: He is alert and oriented to person, place, and time.      Cranial Nerves: No cranial nerve deficit.      Motor: No weakness.      Gait: Gait normal.   Psychiatric:         Mood and Affect: Mood normal.         Behavior: Behavior normal.       William Lucas MD

## 2024-02-19 NOTE — ASSESSMENT & PLAN NOTE
Lab Results   Component Value Date    HGBA1C 11.2 (H) 01/30/2024   Patient recently admitted for new onset diabetes with DKA  He was recently prescribed steroid for Bell's palsy  He was started on fluids and insulin drip and DKA has resolved  He has no prior diagnosis of diabetes  He does have a family history of type 2 diabetes  He was started on basal bolus insulin  Will check C-peptide/antiglycemic antibody for type 1 diabetes  Initiate metformin 500 mg twice daily  Low carbohydrate meal plan discussed with patient  Continue statin  Follow-up: Urine microalbumin creatinine ratio  Recommend yearly ophthalmology and podiatry evaluation

## 2024-02-19 NOTE — PATIENT INSTRUCTIONS

## 2024-02-20 ENCOUNTER — PATIENT OUTREACH (OUTPATIENT)
Dept: FAMILY MEDICINE CLINIC | Facility: CLINIC | Age: 44
End: 2024-02-20

## 2024-02-20 DIAGNOSIS — E78.5 DYSLIPIDEMIA: ICD-10-CM

## 2024-02-20 DIAGNOSIS — E11.9 DIABETES MELLITUS, NEW ONSET (HCC): ICD-10-CM

## 2024-02-20 RX ORDER — INSULIN GLARGINE 100 [IU]/ML
40 INJECTION, SOLUTION SUBCUTANEOUS
Qty: 15 ML | Refills: 2 | Status: SHIPPED | OUTPATIENT
Start: 2024-02-20

## 2024-02-20 RX ORDER — INSULIN LISPRO 100 [IU]/ML
15 INJECTION, SOLUTION INTRAVENOUS; SUBCUTANEOUS
Qty: 15 ML | Refills: 2 | Status: SHIPPED | OUTPATIENT
Start: 2024-02-20

## 2024-02-20 RX ORDER — ATORVASTATIN CALCIUM 40 MG/1
40 TABLET, FILM COATED ORAL
Qty: 30 TABLET | Refills: 2 | Status: SHIPPED | OUTPATIENT
Start: 2024-02-20

## 2024-02-20 RX ORDER — METFORMIN HYDROCHLORIDE 500 MG/1
1000 TABLET, EXTENDED RELEASE ORAL
Qty: 180 TABLET | Refills: 1 | Status: SHIPPED | OUTPATIENT
Start: 2024-02-20

## 2024-02-20 NOTE — PROGRESS NOTES
WING RAHMAN received consult from Provider, regarding pt without insurance and need assistance with his medications.   Per chart review, pt was seen in the ED twice last month for diabetic ketoacidosis.     WING RAHMAN placed call to pt to follow-up and further assess. Introduced self and role to pt. Pt reports he just got out of work, he work overnight.   Pt reports he don't have MA because he is not sure how to apply or if he meet the criteria. Pt reports he work full-time but he also pay child support, rent, bills and buy food. Pt reports his job offered insurance but it is too expensive.   WING RAHMAN explained to pt, he would qualifies for MA bases on his income but they do takes in consideration that his pays child support.     Pt reports he completed an application for MA and provides proof of income to [employer] but he has not heard anything from them.     WING RAHMAN offered to call [employer] and check on the status, before we submit a new application. Pt verbalized understanding.     Pt reports he was able to buy his medications but they were expensive. Per chart review pt goes  to [facility] pharmacy. Suggested to pt he could get the medications more affordable at [facility] pharmacy. WING RAHMAN ask pt if he would like to switch pharmacy and WING RAHMAN will let the Provider know. Pt is agreeable with switching pharmacy.   IB message will be sent to the Provider with the request.     Informs pt if he gets denies for MA he can apply for PAP for the insulin but he would need applied for MA first and get the denial letter. Informs he could also gets insurance through the [employer]. Pt verbalized understanding. WING RAHMAN will continue to follow-up with pt after talking to [employer]. Pt states if WING RAHMAN call to leave a vm as he sleep during the day. WING RAHMAN verbalized understanding.     Placed call to [employer] to check on the status of the application. Spoke with Priscilla and WING RAHMAN was informs the MA application was submitted yesterday. Priscilla states pt would get a letter for [employer]  with the outcomes.     WING RAHMAN will continue to follow-up.

## 2024-02-22 ENCOUNTER — PATIENT OUTREACH (OUTPATIENT)
Dept: FAMILY MEDICINE CLINIC | Facility: CLINIC | Age: 44
End: 2024-02-22

## 2024-02-22 NOTE — PROGRESS NOTES
Medication Patient Assistance Program (MPAP) Specialist  received IB message from WING RAHMAN regarding patient affording medications. MPAP specialist reviewed patient chart prior to outreach. Per CM note patient currently working with [employer] to receive Medical Assistance. Patient unable to afford his diabetic medications. Patient will need assistance if Medical assistance is denied. MPAP specialist has reviewed patient's medications. Patient may qualify for Lantus through [email or web address] based on eligibility criteria. Patient may obtain Lantus for $35 with out insurance. Patient may receive Metformin through [employer] for $4 for a 30 day supply and $9 for a 90 day supply. There is currently no patient assistance program for Lipitor. MPAP specialist will await MA denial before proceeding with patient assistance program and patient outreach. MPAP specialist will reach out to patient in 2 weeks to determine MA application status. MPAP specialist has placed a personal reminder.

## 2024-02-23 ENCOUNTER — PATIENT OUTREACH (OUTPATIENT)
Dept: FAMILY MEDICINE CLINIC | Facility: CLINIC | Age: 44
End: 2024-02-23

## 2024-02-23 NOTE — PROGRESS NOTES
IB message received re IP referral.    I called the patient but received voicemail.  Message was left asking for a return call.  I will continue further outreach.    Chart reviewed.

## 2024-02-27 ENCOUNTER — PATIENT OUTREACH (OUTPATIENT)
Dept: FAMILY MEDICINE CLINIC | Facility: CLINIC | Age: 44
End: 2024-02-27

## 2024-02-27 DIAGNOSIS — E11.9 ENCOUNTER FOR DIABETIC FOOT EXAM (HCC): ICD-10-CM

## 2024-02-27 DIAGNOSIS — E11.9 DIABETES MELLITUS, NEW ONSET (HCC): Primary | ICD-10-CM

## 2024-02-27 NOTE — PROGRESS NOTES
IP referral.    I called the patient, explained my role and he accepts services.  The patient was currently at work so I kept the call brief.    The patient reports improvement in his blood sugars.  He used to have readings 200-300 but recently they have been in the 100's.  He notes he changed his diet by eating more greens and protein.  I reviewed foods high in carbs and asked that he have small portions, continue increasing protein/water intake.    I reviewed the patient's DM meds with him and he was not taking the proper amount of lantus.  He stated he read on the box to inject 0.4.  I informed him this was ml and not units; he is now aware to inject 40U at nighttime.    The patient notes he checks his feet daily and denies any skin breakdown currently.  He did report a history of athlete's foot.  The patient is in need of a Podiatry appointment; note sent to PCP.  He also needs an eye exam; referral already placed but no appointment scheduled to date.    The patient stated he has a glucometer but it has not been working properly and he gets error codes frequently (unsure if these are caused by high BS).  He stated he tried troubleshooting without success. He is interested in CGM; note sent to [facility].  The patient is aware of his appointment Monday with the Pharmacist; CGM can be discussed at that time.  Since the patient is a , this would be beneficial for the patient without the need to stick his finger.     The patient agreed for me to follow up in a few weeks.  He has my contact info and will call with any questions or issues.

## 2024-02-28 ENCOUNTER — TELEPHONE (OUTPATIENT)
Dept: DIABETES SERVICES | Facility: OTHER | Age: 44
End: 2024-02-28

## 2024-02-28 NOTE — TELEPHONE ENCOUNTER
Spoke with pt regarding diabetes education referral.  Pt has appointment on Monday and needs to get blood work.  RD will call patient next week to schedule appointment for diabetes education.

## 2024-03-04 ENCOUNTER — CLINICAL SUPPORT (OUTPATIENT)
Dept: FAMILY MEDICINE CLINIC | Facility: CLINIC | Age: 44
End: 2024-03-04

## 2024-03-04 DIAGNOSIS — E11.9 DIABETES MELLITUS, NEW ONSET (HCC): Primary | ICD-10-CM

## 2024-03-04 DIAGNOSIS — E78.5 DYSLIPIDEMIA: ICD-10-CM

## 2024-03-04 RX ORDER — BLOOD-GLUCOSE SENSOR
1 EACH MISCELLANEOUS
Qty: 2 EACH | Refills: 1 | Status: SHIPPED | OUTPATIENT
Start: 2024-03-04

## 2024-03-04 RX ORDER — ATORVASTATIN CALCIUM 40 MG/1
40 TABLET, FILM COATED ORAL
Qty: 90 TABLET | Refills: 3 | Status: SHIPPED | OUTPATIENT
Start: 2024-03-04

## 2024-03-04 NOTE — PROGRESS NOTES
[facility]  [address], [address]  [place] [id number] [phone]  Clinical Pharmacy Consultation    Encounter provider: Binh Morgan, Pharmacist    This patient was referred by PCP for pharmacy consultation. Thank you for the referral. Sending this note to PCP.    Assessment/Plan  1. Diabetes mellitus, new onset (HCC)  -     Ambulatory referral to Clinical Pharmacy - only for departments with embedded clinical pharmacists      Sounds like steroid induced diabetes, pt does have family history.     Type 2 diabetes:    Current regimen:   Lantus 40 units daily   Humalog - 15 units three times daily before meals   Metformin  mg twice daily   Visit focused on t2dm onset.   Blood glucose marginally controlled   Most recent A1c above goal of <7%.   Currently asymptomatic and non-adherant with treatment plan  Medications previously tried: None  Sending this encounter note for PCP review/concurrence.  Screening:  - Microalb:Cr is within an acceptable range  Check blood sugars  4 times daily and record.   Discussed freestyle tala vs dexcom   Counseled lifestyle improvement, to follow a diabetic diet, and decrease high carbohydrates snacks.   Counseled on medication compliance and exercise as tolerated.  Overall, patient is adherent.  He is taking his metformin and tolerating it well.  Additionally he  notes that he is out of atorvastatin and needs a refill.  Sent.  Discussed insulin at length, for a while, he was only using Lantus 4 units instead of 40 units daily.  His BG is 114 in office today.  This is after eating a very light breakfast   Patient is a .  Goal will be to get him off insulin.  Clinically, this appears to be possible given the progress he is making.  He is following a very strict low-carb diet.  Discussed healthy diabetes diet in general and in detail.  However, patient is already following this pretty closely  Main issue here, as  patient needs a way to monitor BG.  He does not like poking himself, he has problems with his meter.  Discussed freestyle cullen versus Dexcom at length.  Because patient has commercial insurance plan with high deductible recommend freestyle cullen as will be more affordable.  He is out of Humalog.  Okay to hold at this point  Patient has a C-peptide lab ordered from PCP, but did not obtain yet.  Discussed importance of this lab and why it is relevant.  Asked patient to please obtain his lab before next visit  Freestyle Cullen 3 CGM teaching only  Skin cleaned with alcohol  Unscrew applicator; peel off sensor  Combine applicator/sensor by lining up the 2 lines and push until it clicks  Sensor insertion    Patient instructions reviewed:   Sensor is waterproof for showering and swimming  Remove for MRI, CT  Remove if redness, bleeding, swelling, discomfort at site  Replace sensor in 14 days  Scan every 8 hours for blood sugar  Glucometer verification: confirm CGM reading if needed  LibreView: provided information on syncing LibreView with PCP practice     New DM regimen:   Lantus 40 units daily   Okay to decrease, assess dose at next visit  HOLD: Humalog - 15 units three times daily before meals   Metformin ER 1000 mg twice daily   Permission given to titrate to 1000 mg twice daily, and decrease insulin as able once freestyle cullen is attached  FUTURE: Goal will be to get patient off insulin.  Pending response.  Discussed this potential option for patient in detail.  Discussed another medicine may be needed.  Consider SGLT2I or GLP-1 with discount card  RTC in in 4 weeks for assessment of glucose readings/medication review    Hyperlipidemia without clinical ASCVD event:  The 10-year ASCVD risk score (Marcela MORENO, et al., 2019) is: 4.9%    Values used to calculate the score:      Age: 43 years      Sex: Male      Is Non- : No      Diabetic: Yes      Tobacco smoker: No      Systolic Blood Pressure:  120 mmHg      Is BP treated: No      HDL Cholesterol: 41 mg/dL      Total Cholesterol: 266 mg/dL   Most recent lipid panel is within an acceptable range  2018 ACC/AHA blood cholesterol guidelines recommends moderate-intensity statin. Patient currently on moderate-intensity statin and tolerating well without side effects.   Continue atorvastatin as prescribed.    BMI Counseling: There is no height or weight on file to calculate BMI. The BMI is above normal. Nutrition recommendations include reducing portion sizes, decreasing overall calorie intake, 3-5 servings of fruits/vegetables daily, reducing fast food intake, consuming healthier snacks, decreasing soda and/or juice intake, moderation in carbohydrate intake, increasing intake of lean protein, reducing intake of saturated fat and trans fat, and reducing intake of cholesterol. Exercise recommendations include moderate aerobic physical activity for 150 minutes/week.    BMI Categories:  Underweight = <18.5  Normal weight = 18.5-24.9  Overweight = 25-29.9  Obesity = BMI of 30 or greater     FYI to PCP:   Will pend Rx for PCP signature/concurrence  Orders placed under CPA    Medication list was updated and discussed with patient.   Discontinued medications: none   Clinically significant drug interactions identified: none   Side effects from medication(s) reported: none  Full medication reconciliation complete.  Patient adherent overall    The patient communicates understanding of the treatment plan.    M*Modal Dragon software was used to dictate this note. It may contain errors with dictating incorrect words or incorrect spelling. Please contact the provider directly with any questions. Thank you for your understanding.    Subjective  Jhonny Eastman is a 43 y.o. male who presents in-person for initial clinical pharmacist consult.  Reason for visit: diabetes.   No particular questions or concerns.  HPI   Feeling well overall, more energy, doing better     Social History      Tobacco Use   Smoking Status Former    Current packs/day: 1.00    Types: Cigarettes   Smokeless Tobacco Never        No Known Allergies       Current Outpatient Medications:     Alcohol Swabs 70 % PADS, May substitute brand based on insurance coverage. Check glucose ACHS., Disp: 200 each, Rfl: 0    artificial tear (LUBRIFRESH P.M.) 83-15 % ophthalmic ointment, Administer to both eyes 3 (three) times a day As Needed, Disp: , Rfl:     atorvastatin (LIPITOR) 40 mg tablet, Take 1 tablet (40 mg total) by mouth daily with dinner, Disp: 30 tablet, Rfl: 2    Blood Glucose Monitoring Suppl (OneTouch Verio Reflect) w/Device KIT, May substitute brand based on insurance coverage. Check glucose ACHS., Disp: 1 kit, Rfl: 0    glucose blood (OneTouch Verio) test strip, May substitute brand based on insurance coverage. Check glucose ACHS., Disp: 200 each, Rfl: 0    Insulin Glargine Solostar (Lantus SoloStar) 100 UNIT/ML SOPN, Inject 0.4 mL (40 Units total) under the skin daily at bedtime, Disp: 15 mL, Rfl: 2    insulin lispro (HumaLOG KwikPen) 100 units/mL injection pen, Inject 15 Units under the skin 3 (three) times a day with meals, Disp: 15 mL, Rfl: 2    Insulin Pen Needle (BD Pen Needle Ceci 2nd Gen) 32G X 4 MM MISC, For use with insulin pen. Pharmacy may dispense brand covered by insurance., Disp: 100 each, Rfl: 0    Insulin Pen Needle (BD Pen Needle Ceci 2nd Gen) 32G X 4 MM MISC, For use with insulin pen. Pharmacy may dispense brand covered by insurance., Disp: 100 each, Rfl: 0    metFORMIN (GLUCOPHAGE-XR) 500 mg 24 hr tablet, Take 2 tablets (1,000 mg total) by mouth daily with breakfast, Disp: 180 tablet, Rfl: 1    OneTouch Delica Lancets 33G MISC, May substitute brand based on insurance coverage. Check glucose ACHS., Disp: 200 each, Rfl: 0    pantoprazole (PROTONIX) 40 mg tablet, Take 1 tablet (40 mg total) by mouth daily before breakfast, Disp: 30 tablet, Rfl: 0    Objective  Vitals:   Wt Readings from Last 3  "Encounters:   02/19/24 113 kg (249 lb)   02/01/24 114 kg (251 lb 12.3 oz)   01/05/24 119 kg (263 lb)     Temp Readings from Last 3 Encounters:   02/01/24 97.7 °F (36.5 °C)   01/07/24 (!) 97.4 °F (36.3 °C) (Temporal)     BP Readings from Last 3 Encounters:   02/19/24 120/70   02/01/24 (!) 147/101   01/07/24 123/62     Pulse Readings from Last 3 Encounters:   02/19/24 95   01/31/24 92   01/07/24 92       Labs:   Lab Results   Component Value Date/Time    HGBA1C 11.2 (H) 01/30/2024 04:27 AM       Lab Results   Component Value Date/Time    BUN 13 01/31/2024 04:37 AM    BUN 19 01/30/2024 12:54 PM    BUN 19 01/30/2024 03:28 AM    CREATININE 1.01 01/31/2024 04:37 AM    CREATININE 1.23 01/30/2024 12:54 PM    CREATININE 1.01 01/30/2024 03:28 AM    EGFR 91 01/31/2024 04:37 AM    EGFR 72 01/30/2024 12:54 PM    EGFR 91 01/30/2024 03:28 AM       Lab Results   Component Value Date/Time    CREATININEUR 90.0 01/31/2024 12:16 PM    LABMICR 9.5 01/31/2024 12:16 PM    MICROALBCRE 11 01/31/2024 12:16 PM       Lab Results   Component Value Date/Time    CHOLESTEROL 266 (H) 01/30/2024 03:28 AM    TRIG 248 (H) 01/30/2024 03:28 AM    HDL 41 01/30/2024 03:28 AM    LDLCALC 175 (H) 01/30/2024 03:28 AM       Eye Exam: No results found for: \"RIGHTDIABRET\", \"RIGHTMACEDEM\", \"RIGHTOTHERRE\", \"LEFTDIABRET\", \"LEFTMACEDEM\", \"LEFTOTHERRE\"  Binh Morgan Pharmacist    -----------------------------------------------------------------------------------------------    Pharmacist Tracking Tool  Reason For Outreach: Embedded Pharmacist  Demographics:  Intervention Method: In Person  Type of Intervention: Follow-Up  Topics Addressed: Diabetes  Pharmacologic Interventions: Medication Conversion  Non-Pharmacologic Interventions: Care coordination, Chart update, Cost, Disease state education, Medication Monitoring, and Personal CGM  Time:  Direct Patient Care:  45  mins  Care Coordination:  15  mins  Recommendation Recipient: Patient/Caregiver  Outcome: Accepted "

## 2024-03-05 ENCOUNTER — PATIENT OUTREACH (OUTPATIENT)
Dept: FAMILY MEDICINE CLINIC | Facility: CLINIC | Age: 44
End: 2024-03-05

## 2024-03-05 NOTE — PROGRESS NOTES
Per chart review, pt have health insurance.     WING Rahman placed call to pt to see if he was approved for MA. Pt reports he was not approved for MA due to being over income. Pt states he got insurance though the market place. Pt states he still have a co-pay for the medications but it is cheaper than paying out of packet. Pt states going to [facility] pharmacy is also a big help.     Pt states he is getting bills from the hospital. Suggested calling the financial counselor so he can work with them. Provided [facility] phone number. Pt verbalized understanding and thanked WING RAHMAN for following-up. Pt is aware this referral would be close but he can reach out as needed. Will remains available for future consult as needed. No further outreach.

## 2024-03-11 ENCOUNTER — PATIENT OUTREACH (OUTPATIENT)
Dept: FAMILY MEDICINE CLINIC | Facility: CLINIC | Age: 44
End: 2024-03-11

## 2024-03-11 ENCOUNTER — APPOINTMENT (OUTPATIENT)
Dept: LAB | Facility: CLINIC | Age: 44
End: 2024-03-11
Payer: COMMERCIAL

## 2024-03-11 DIAGNOSIS — E11.9 DIABETES MELLITUS, NEW ONSET (HCC): ICD-10-CM

## 2024-03-11 PROCEDURE — 84681 ASSAY OF C-PEPTIDE: CPT

## 2024-03-11 PROCEDURE — 86341 ISLET CELL ANTIBODY: CPT

## 2024-03-11 PROCEDURE — 36415 COLL VENOUS BLD VENIPUNCTURE: CPT

## 2024-03-11 PROCEDURE — 83519 RIA NONANTIBODY: CPT

## 2024-03-11 NOTE — PROGRESS NOTES
Medication Patient Assistance Program (MPAP) Specialist received Personal a reminder notification regarding patient's eligibility for patient assistance programs. MPAP specialist reached out to [facility]. Per [facility] patient has insurance and his medications are going to [facility] pharmacy. MPAP specialist reviewed patient chart prior to outreach.  Patient's medications are predominately generics. Due to patient having insurance coverage, patient will not qualify for patient assistance program at this time. MPAP specialist is closing Referral to Medication Patient Assistance .

## 2024-03-12 LAB — C PEPTIDE SERPL-MCNC: 5.4 NG/ML (ref 1.1–4.4)

## 2024-03-13 LAB — PANC ISLET CELL AB TITR SER: NEGATIVE {TITER}

## 2024-03-14 LAB — GAD65 AB SER-ACNC: <5 U/ML (ref 0–5)

## 2024-03-15 ENCOUNTER — PATIENT OUTREACH (OUTPATIENT)
Dept: FAMILY MEDICINE CLINIC | Facility: CLINIC | Age: 44
End: 2024-03-15

## 2024-03-15 NOTE — PROGRESS NOTES
I called the patient to remind him of his PCP appointment on 3/18 at 0840; he plans on attending.   I will continue to follow.

## 2024-03-18 ENCOUNTER — OFFICE VISIT (OUTPATIENT)
Dept: FAMILY MEDICINE CLINIC | Facility: CLINIC | Age: 44
End: 2024-03-18

## 2024-03-18 VITALS
WEIGHT: 251.25 LBS | HEIGHT: 72 IN | TEMPERATURE: 97.6 F | HEART RATE: 86 BPM | SYSTOLIC BLOOD PRESSURE: 128 MMHG | BODY MASS INDEX: 34.03 KG/M2 | DIASTOLIC BLOOD PRESSURE: 72 MMHG | OXYGEN SATURATION: 96 % | RESPIRATION RATE: 18 BRPM

## 2024-03-18 DIAGNOSIS — Z79.4 TYPE 2 DIABETES MELLITUS WITHOUT COMPLICATION, WITH LONG-TERM CURRENT USE OF INSULIN (HCC): Primary | ICD-10-CM

## 2024-03-18 DIAGNOSIS — E78.5 DYSLIPIDEMIA: ICD-10-CM

## 2024-03-18 DIAGNOSIS — Z23 ENCOUNTER FOR IMMUNIZATION: ICD-10-CM

## 2024-03-18 DIAGNOSIS — E11.9 TYPE 2 DIABETES MELLITUS WITHOUT COMPLICATION, WITH LONG-TERM CURRENT USE OF INSULIN (HCC): Primary | ICD-10-CM

## 2024-03-18 PROCEDURE — 90471 IMMUNIZATION ADMIN: CPT | Performed by: FAMILY MEDICINE

## 2024-03-18 PROCEDURE — 90472 IMMUNIZATION ADMIN EACH ADD: CPT | Performed by: FAMILY MEDICINE

## 2024-03-18 PROCEDURE — 90677 PCV20 VACCINE IM: CPT | Performed by: FAMILY MEDICINE

## 2024-03-18 PROCEDURE — 90715 TDAP VACCINE 7 YRS/> IM: CPT | Performed by: FAMILY MEDICINE

## 2024-03-18 PROCEDURE — 90686 IIV4 VACC NO PRSV 0.5 ML IM: CPT | Performed by: FAMILY MEDICINE

## 2024-03-18 PROCEDURE — 99214 OFFICE O/P EST MOD 30 MIN: CPT | Performed by: FAMILY MEDICINE

## 2024-03-18 RX ORDER — INSULIN LISPRO 100 [IU]/ML
15 INJECTION, SOLUTION INTRAVENOUS; SUBCUTANEOUS
Qty: 15 ML | Refills: 2 | Status: SHIPPED | OUTPATIENT
Start: 2024-03-18

## 2024-03-18 NOTE — PROGRESS NOTES
Name: Jhonny Eastman      : 1980      MRN: [id number]  Encounter Provider: William Lucas MD  Encounter Date: 3/18/2024   Encounter department: [facility]    Assessment & Plan     1. Type 2 diabetes mellitus without complication, with long-term current use of insulin ([facility])  Assessment & Plan:    Lab Results   Component Value Date    HGBA1C 11.2 (H) 2024   Ambulatory blood sugar has been improving with CGM  Reviewed labs with patient(C-peptide elevated which suggest insulin resistance, negative antislet cells and glutamic acid decarboxylase)  Continue lispro 15 units 3 times daily, Lantus 40 units, and metformin  Continue statin  Recommend yearly ophthalmology and podiatry evaluation    Orders:  -     insulin lispro (HumaLOG KwikPen) 100 units/mL injection pen; Inject 15 Units under the skin 3 (three) times a day with meals    2. Dyslipidemia  Assessment & Plan:  Recommend low cholesterol diet  Continue statin      3. Encounter for immunization  -     FLUZONE: influenza vaccine, quadrivalent, 0.5 mL  -     Pneumococcal Conjugate Vaccine 20-valent (Pcv20)  -     Tdap Vaccine greater than or equal to 8yo           Subjective      43-year-old male with a history of type 2 diabetes and dyslipidemia who presents today for follow-up.  He is doing well overall.  Patient reports that his blood sugar has been well-controlled.  He reports that he is continuous glucose monitor readings ranges between 90-120s.  He reports that he is following a low carbohydrate diet.  He tries to exercise often.          Review of Systems   Constitutional:  Negative for chills, diaphoresis, fatigue and fever.   Respiratory:  Negative for shortness of breath and wheezing.    Cardiovascular:  Negative for chest pain and palpitations.   Gastrointestinal:  Negative for abdominal pain, diarrhea, nausea and vomiting.   Endocrine: Negative for polydipsia, polyphagia and polyuria.    Musculoskeletal:  Negative for back pain.   Skin:  Negative for rash.   Neurological:  Negative for seizures, syncope and headaches.       Current Outpatient Medications on File Prior to Visit   Medication Sig    Alcohol Swabs 70 % PADS May substitute brand based on insurance coverage. Check glucose ACHS.    artificial tear (LUBRIFRESH P.M.) 83-15 % ophthalmic ointment Administer to both eyes 3 (three) times a day As Needed    atorvastatin (LIPITOR) 40 mg tablet Take 1 tablet (40 mg total) by mouth daily with dinner    Blood Glucose Monitoring Suppl (OneTouch Verio Reflect) w/Device KIT May substitute brand based on insurance coverage. Check glucose ACHS.    Continuous Blood Gluc Sensor (FreeStyle Cullen 3 Sensor) MISC Use 1 each every 14 (fourteen) days    glucose blood (OneTouch Verio) test strip May substitute brand based on insurance coverage. Check glucose ACHS.    Insulin Glargine Solostar (Lantus SoloStar) 100 UNIT/ML SOPN Inject 0.4 mL (40 Units total) under the skin daily at bedtime    Insulin Pen Needle (BD Pen Needle Ceci 2nd Gen) 32G X 4 MM MISC For use with insulin pen. Pharmacy may dispense brand covered by insurance.    Insulin Pen Needle (BD Pen Needle Ceci 2nd Gen) 32G X 4 MM MISC For use with insulin pen. Pharmacy may dispense brand covered by insurance.    metFORMIN (GLUCOPHAGE-XR) 500 mg 24 hr tablet Take 2 tablets (1,000 mg total) by mouth daily with breakfast    OneTouch Delica Lancets 33G MISC May substitute brand based on insurance coverage. Check glucose ACHS.    pantoprazole (PROTONIX) 40 mg tablet Take 1 tablet (40 mg total) by mouth daily before breakfast    [DISCONTINUED] insulin lispro (HumaLOG KwikPen) 100 units/mL injection pen Inject 15 Units under the skin 3 (three) times a day with meals       Objective     /72 (BP Location: Right arm, Patient Position: Sitting, Cuff Size: Large)   Pulse 86   Temp 97.6 °F (36.4 °C) (Temporal)   Resp 18   Ht 6' (1.829 m)   Wt 114 kg (251  lb 4 oz)   SpO2 96%   BMI 34.08 kg/m²     Physical Exam  Constitutional:       General: He is not in acute distress.     Appearance: Normal appearance. He is well-developed. He is not ill-appearing, toxic-appearing or diaphoretic.   HENT:      Head: Normocephalic and atraumatic.      Right Ear: External ear normal.      Left Ear: External ear normal.      Nose: Nose normal.      Mouth/Throat:      Pharynx: No oropharyngeal exudate or posterior oropharyngeal erythema.   Eyes:      General: No scleral icterus.        Right eye: No discharge.         Left eye: No discharge.      Extraocular Movements: Extraocular movements intact.      Conjunctiva/sclera: Conjunctivae normal.   Neck:      Vascular: No carotid bruit.   Cardiovascular:      Rate and Rhythm: Normal rate and regular rhythm.      Heart sounds: Normal heart sounds. No murmur heard.     No friction rub. No gallop.   Pulmonary:      Effort: Pulmonary effort is normal. No respiratory distress.      Breath sounds: Normal breath sounds. No stridor. No wheezing or rhonchi.   Abdominal:      General: Bowel sounds are normal. There is no distension.      Palpations: Abdomen is soft. There is no mass.      Tenderness: There is no abdominal tenderness. There is no guarding.   Musculoskeletal:         General: Normal range of motion.      Cervical back: Normal range of motion and neck supple.      Right lower leg: No edema.      Left lower leg: No edema.   Skin:     General: Skin is warm.      Capillary Refill: Capillary refill takes less than 2 seconds.      Findings: No lesion or rash.   Neurological:      General: No focal deficit present.      Mental Status: He is alert and oriented to person, place, and time.      Cranial Nerves: No cranial nerve deficit.      Motor: No weakness.      Gait: Gait normal.   Psychiatric:         Mood and Affect: Mood normal.         Behavior: Behavior normal.       William Lucas MD

## 2024-03-18 NOTE — ASSESSMENT & PLAN NOTE
Lab Results   Component Value Date    HGBA1C 11.2 (H) 01/30/2024   Ambulatory blood sugar has been improving with CGM  Reviewed labs with patient(C-peptide elevated which suggest insulin resistance, negative antislet cells and glutamic acid decarboxylase)  Continue lispro 15 units 3 times daily, Lantus 40 units, and metformin  Continue statin  Recommend yearly ophthalmology and podiatry evaluation

## 2024-04-04 ENCOUNTER — PATIENT OUTREACH (OUTPATIENT)
Dept: FAMILY MEDICINE CLINIC | Facility: CLINIC | Age: 44
End: 2024-04-04

## 2024-04-04 NOTE — PROGRESS NOTES
I called the patient but received voicemail.  Message was left stating I will call back at another time.

## 2024-04-12 ENCOUNTER — PATIENT OUTREACH (OUTPATIENT)
Dept: FAMILY MEDICINE CLINIC | Facility: CLINIC | Age: 44
End: 2024-04-12

## 2024-04-12 NOTE — PROGRESS NOTES
I called the patient to follow up and for his assessment.  He states he has been doing well.  He reports his blood sugars are much improved with readings  (during this call at 1400 it was 104).  The patient states he is watching his diet carefully.  He is a  and notes he prepares 4 meals for the week and 1 day he will purchase healthy options.  I commended him on this and keeping his BS under control.      The patient states he will calling to schedule an eye exam soon as he needs new eyeglasses.  He has not heard from Podiatry regarding an appointment; note sent to Binh to assist.      I reminded the patient of his appointment with Binh on Monday, 4/15 at 0900; he plans on attending.  I will call the patient prior to his PCP appointment in a few weeks when he will get an updated A1C.

## 2024-04-15 ENCOUNTER — CLINICAL SUPPORT (OUTPATIENT)
Dept: FAMILY MEDICINE CLINIC | Facility: CLINIC | Age: 44
End: 2024-04-15

## 2024-04-15 VITALS — DIASTOLIC BLOOD PRESSURE: 68 MMHG | HEART RATE: 86 BPM | SYSTOLIC BLOOD PRESSURE: 117 MMHG

## 2024-04-15 DIAGNOSIS — E11.9 TYPE 2 DIABETES MELLITUS WITHOUT COMPLICATION, WITH LONG-TERM CURRENT USE OF INSULIN (HCC): Primary | ICD-10-CM

## 2024-04-15 DIAGNOSIS — Z79.4 TYPE 2 DIABETES MELLITUS WITHOUT COMPLICATION, WITH LONG-TERM CURRENT USE OF INSULIN (HCC): Primary | ICD-10-CM

## 2024-04-15 NOTE — PROGRESS NOTES
[facility]  [address] [place] [address], [address]  [facility] [id number] [phone]  Clinical Pharmacy Consultation    Encounter provider: Binh Morgan, Pharmacist    This patient was referred by PCP for pharmacy consultation. Thank you for the referral. Sending this note to PCP.    Assessment/Plan  1. Type 2 diabetes mellitus without complication, with long-term current use of insulin (HCC)        Type 2 diabetes:    Current Diabetes Regimen:  Lantus 40 units daily   Okay to decrease, assess dose at next visit  HOLD: Humalog - 15 units three times daily before meals   Metformin  mg twice daily   Forgot to get up to 1000 mg twice daily   Visit focused on Diabetes care and status  Pt had C peptide and anti-islet cell antibody tested, pt presents as standard T2DM case. Therefore if affordable, GLP1 or SGLT2i would be excellent options for pt   Blood glucose very well controlled   Most recent A1c above goal of <7%.   Currently asymptomatic and adherent with treatment plan  Patient is doing absolutely excellent with treatment plan and goals.  Patient has radically changed his diet, avoids high carb foods.  Rarely eats bread, pasta, rice etc.  He is wearing the freestyle tala 3 which helps him monitor this closely, encourage its use.  He obtains it from [employer] and with some type of discount card through his insurance  Due to such strong control, okay to stop insulin at this time, he admits that he typically only uses it about 3 times weekly anyway, and increase metformin to full ADA recommended dose with 1000 mg twice daily  Congratulated on progress   Discussed likely gaston of false hypogylcemia, pressure lows etc. Pt denies any symptoms. Recommend confirming with fingerstick   Results of recent BG monitoring: below:                   New DM regimen:   STOP: Lantus 40 units daily   STOP: Humalog - 15 units three times daily before meals   INC: Metformin ER 1000 mg twice  daily   Permission given to titrate to 1000 mg twice daily, and decrease insulin as able once freestyle tala is attached  FUTURE: consider addition of GLP1 or SGLT2i with discount card if needed   RTC in in 3 months     Hyperlipidemia without clinical ASCVD event:  The 10-year ASCVD risk score (Marcela MORENO, et al., 2019) is: 5.5%    Values used to calculate the score:      Age: 43 years      Sex: Male      Is Non- : No      Diabetic: Yes      Tobacco smoker: No      Systolic Blood Pressure: 128 mmHg      Is BP treated: No      HDL Cholesterol: 41 mg/dL      Total Cholesterol: 266 mg/dL   Most recent lipid panel is within an acceptable range  2018 ACC/AHA blood cholesterol guidelines recommends moderate-intensity statin. Patient currently on moderate-intensity statin and tolerating well without side effects.   Continue atorvastatin as prescribed.    HTN:   BP goal: <140/90 mmHg   In-office BP below goal, HR acceptable.     FYI to PCP:     Orders placed under CPA    Medication list was updated and discussed with patient.   Discontinued medications: none   Clinically significant drug interactions identified: none   Side effects from medication(s) reported: none    The patient communicates understanding of the treatment plan.    M*Modal Dragon software was used to dictate this note. It may contain errors with dictating incorrect words or incorrect spelling. Please contact the provider directly with any questions. Thank you for your understanding.    Subjective  Jhonny Eastman is a 43 y.o. male who presents in-person for follow-up.  Reason for visit: diabetes.   No particular questions or concerns.  HPI   No changes, feeling well, happy with progress/diet etc    Social History     Tobacco Use   Smoking Status Former    Current packs/day: 1.00    Types: Cigarettes   Smokeless Tobacco Never        No Known Allergies       Current Outpatient Medications:     Alcohol Swabs 70 % PADS, May substitute brand  based on insurance coverage. Check glucose ACHS., Disp: 200 each, Rfl: 0    artificial tear (LUBRIFRESH P.M.) 83-15 % ophthalmic ointment, Administer to both eyes 3 (three) times a day As Needed, Disp: , Rfl:     atorvastatin (LIPITOR) 40 mg tablet, Take 1 tablet (40 mg total) by mouth daily with dinner, Disp: 90 tablet, Rfl: 3    Blood Glucose Monitoring Suppl (OneTouch Verio Reflect) w/Device KIT, May substitute brand based on insurance coverage. Check glucose ACHS., Disp: 1 kit, Rfl: 0    Continuous Blood Gluc Sensor (FreeStyle Cullen 3 Sensor) MISC, Use 1 each every 14 (fourteen) days, Disp: 2 each, Rfl: 1    glucose blood (OneTouch Verio) test strip, May substitute brand based on insurance coverage. Check glucose ACHS., Disp: 200 each, Rfl: 0    Insulin Glargine Solostar (Lantus SoloStar) 100 UNIT/ML SOPN, Inject 0.4 mL (40 Units total) under the skin daily at bedtime, Disp: 15 mL, Rfl: 2    insulin lispro (HumaLOG KwikPen) 100 units/mL injection pen, Inject 15 Units under the skin 3 (three) times a day with meals, Disp: 15 mL, Rfl: 2    Insulin Pen Needle (BD Pen Needle Ceci 2nd Gen) 32G X 4 MM MISC, For use with insulin pen. Pharmacy may dispense brand covered by insurance., Disp: 100 each, Rfl: 0    Insulin Pen Needle (BD Pen Needle Ceci 2nd Gen) 32G X 4 MM MISC, For use with insulin pen. Pharmacy may dispense brand covered by insurance., Disp: 100 each, Rfl: 0    metFORMIN (GLUCOPHAGE-XR) 500 mg 24 hr tablet, Take 2 tablets (1,000 mg total) by mouth daily with breakfast, Disp: 180 tablet, Rfl: 1    OneTouch Delica Lancets 33G MISC, May substitute brand based on insurance coverage. Check glucose ACHS., Disp: 200 each, Rfl: 0    pantoprazole (PROTONIX) 40 mg tablet, Take 1 tablet (40 mg total) by mouth daily before breakfast, Disp: 30 tablet, Rfl: 0    Objective  Vitals:   Wt Readings from Last 3 Encounters:   03/18/24 114 kg (251 lb 4 oz)   02/19/24 113 kg (249 lb)   02/01/24 114 kg (251 lb 12.3 oz)     Temp  "Readings from Last 3 Encounters:   03/18/24 97.6 °F (36.4 °C) (Temporal)   02/01/24 97.7 °F (36.5 °C)   01/07/24 (!) 97.4 °F (36.3 °C) (Temporal)     BP Readings from Last 3 Encounters:   03/18/24 128/72   02/19/24 120/70   02/01/24 (!) 147/101     Pulse Readings from Last 3 Encounters:   03/18/24 86   02/19/24 95   01/31/24 92       Labs:   Lab Results   Component Value Date/Time    HGBA1C 11.2 (H) 01/30/2024 04:27 AM       Lab Results   Component Value Date/Time    BUN 13 01/31/2024 04:37 AM    BUN 19 01/30/2024 12:54 PM    BUN 19 01/30/2024 03:28 AM    CREATININE 1.01 01/31/2024 04:37 AM    CREATININE 1.23 01/30/2024 12:54 PM    CREATININE 1.01 01/30/2024 03:28 AM    EGFR 91 01/31/2024 04:37 AM    EGFR 72 01/30/2024 12:54 PM    EGFR 91 01/30/2024 03:28 AM       Lab Results   Component Value Date/Time    CREATININEUR 90.0 01/31/2024 12:16 PM    LABMICR 9.5 01/31/2024 12:16 PM    MICROALBCRE 11 01/31/2024 12:16 PM       Lab Results   Component Value Date/Time    CHOLESTEROL 266 (H) 01/30/2024 03:28 AM    TRIG 248 (H) 01/30/2024 03:28 AM    HDL 41 01/30/2024 03:28 AM    LDLCALC 175 (H) 01/30/2024 03:28 AM       Eye Exam: No results found for: \"RIGHTDIABRET\", \"RIGHTMACEDEM\", \"RIGHTOTHERRE\", \"LEFTDIABRET\", \"LEFTMACEDEM\", \"LEFTOTHERRE\"  Ludwig Perez    -----------------------------------------------------------------------------------------------    Pharmacist Tracking Tool  Reason For Outreach: Embedded Pharmacist  Demographics:  Intervention Method: In Person  Type of Intervention: Follow-Up  Topics Addressed: Diabetes  Pharmacologic Interventions: Dose or Frequency Adjusted, Prevent or Manage ROOSEVELT, and Med Rec  Non-Pharmacologic Interventions: Care coordination, Chart update, Disease state education, Medication Monitoring, and Medication/Device education  Time:  Direct Patient Care:  30  mins  Care Coordination:  20  mins  Recommendation Recipient: Patient/Caregiver  Outcome: Accepted   "

## 2024-04-23 ENCOUNTER — TELEPHONE (OUTPATIENT)
Dept: INTERNAL MEDICINE CLINIC | Facility: OTHER | Age: 44
End: 2024-04-23

## 2024-04-24 ENCOUNTER — TELEPHONE (OUTPATIENT)
Dept: FAMILY MEDICINE CLINIC | Facility: CLINIC | Age: 44
End: 2024-04-24

## 2024-04-24 DIAGNOSIS — E11.9 DIABETES MELLITUS, NEW ONSET (HCC): ICD-10-CM

## 2024-04-24 DIAGNOSIS — E78.5 DYSLIPIDEMIA: ICD-10-CM

## 2024-04-24 RX ORDER — BLOOD-GLUCOSE SENSOR
EACH MISCELLANEOUS
Qty: 2 EACH | Refills: 0 | Status: SHIPPED | OUTPATIENT
Start: 2024-04-24

## 2024-04-24 RX ORDER — METFORMIN HYDROCHLORIDE 500 MG/1
TABLET, EXTENDED RELEASE ORAL
Qty: 120 TABLET | Refills: 5 | Status: SHIPPED | OUTPATIENT
Start: 2024-04-24

## 2024-04-24 RX ORDER — ATORVASTATIN CALCIUM 40 MG/1
40 TABLET, FILM COATED ORAL
Qty: 90 TABLET | Refills: 3 | Status: SHIPPED | OUTPATIENT
Start: 2024-04-24

## 2024-04-24 NOTE — TELEPHONE ENCOUNTER
Spoke via phone     Pt requests refills atorv and metformin    Sent    Pharmacist Tracking Tool  Reason For Outreach: Embedded Pharmacist  Demographics:  Intervention Method: Phone  Type of Intervention: Follow-Up  Topics Addressed: Diabetes and Dyslipidemia  Pharmacologic Interventions: N/A  Non-Pharmacologic Interventions: Care coordination and Chart update  Time:  Direct Patient Care:  5  mins  Care Coordination:  5  mins  Recommendation Recipient: Patient/Caregiver  Outcome: Accepted     Binh Morgan PharmD, BCACP  Ambulatory Care Clinical Pharmacist

## 2024-05-03 ENCOUNTER — PATIENT OUTREACH (OUTPATIENT)
Dept: FAMILY MEDICINE CLINIC | Facility: CLINIC | Age: 44
End: 2024-05-03

## 2024-05-03 NOTE — PROGRESS NOTES
I called the patient to remind him of his PCP appointment on Monday, 5/6 at 0820; he plans on attending.    I will continue to follow.

## 2024-05-06 ENCOUNTER — OFFICE VISIT (OUTPATIENT)
Dept: FAMILY MEDICINE CLINIC | Facility: CLINIC | Age: 44
End: 2024-05-06

## 2024-05-06 VITALS
RESPIRATION RATE: 18 BRPM | BODY MASS INDEX: 33.08 KG/M2 | OXYGEN SATURATION: 97 % | HEART RATE: 89 BPM | DIASTOLIC BLOOD PRESSURE: 77 MMHG | HEIGHT: 72 IN | WEIGHT: 244.2 LBS | TEMPERATURE: 98 F | SYSTOLIC BLOOD PRESSURE: 118 MMHG

## 2024-05-06 DIAGNOSIS — E11.9 TYPE 2 DIABETES MELLITUS WITHOUT COMPLICATION, WITH LONG-TERM CURRENT USE OF INSULIN (HCC): Primary | ICD-10-CM

## 2024-05-06 DIAGNOSIS — N52.01 ERECTILE DYSFUNCTION DUE TO ARTERIAL INSUFFICIENCY: ICD-10-CM

## 2024-05-06 DIAGNOSIS — Z79.4 TYPE 2 DIABETES MELLITUS WITHOUT COMPLICATION, WITH LONG-TERM CURRENT USE OF INSULIN (HCC): Primary | ICD-10-CM

## 2024-05-06 DIAGNOSIS — Z00.00 ANNUAL PHYSICAL EXAM: ICD-10-CM

## 2024-05-06 PROBLEM — N17.9 ACUTE KIDNEY INJURY (HCC): Status: RESOLVED | Noted: 2024-01-29 | Resolved: 2024-05-06

## 2024-05-06 PROBLEM — H70.001 MASTOIDITIS, ACUTE, RIGHT: Status: RESOLVED | Noted: 2024-01-05 | Resolved: 2024-05-06

## 2024-05-06 PROBLEM — G51.0 CRANIAL NERVE VII PALSY: Status: RESOLVED | Noted: 2024-01-05 | Resolved: 2024-05-06

## 2024-05-06 LAB — SL AMB POCT HEMOGLOBIN AIC: 6 (ref ?–6.5)

## 2024-05-06 PROCEDURE — 99214 OFFICE O/P EST MOD 30 MIN: CPT | Performed by: FAMILY MEDICINE

## 2024-05-06 PROCEDURE — 83036 HEMOGLOBIN GLYCOSYLATED A1C: CPT | Performed by: FAMILY MEDICINE

## 2024-05-06 PROCEDURE — 99396 PREV VISIT EST AGE 40-64: CPT | Performed by: FAMILY MEDICINE

## 2024-05-06 RX ORDER — SILDENAFIL 50 MG/1
50 TABLET, FILM COATED ORAL DAILY PRN
Qty: 10 TABLET | Refills: 1 | Status: SHIPPED | OUTPATIENT
Start: 2024-05-06

## 2024-05-06 NOTE — ASSESSMENT & PLAN NOTE
Lab Results   Component Value Date    HGBA1C 6.0 05/06/2024   Well-controlled  Continue metformin 1000 mg twice daily with meals  Continue statin  Recommend yearly ophthalmology and podiatry evaluation

## 2024-05-06 NOTE — PROGRESS NOTES
ADULT ANNUAL PHYSICAL  [facility]    NAME: Jhonny Eastman  AGE: 43 y.o. SEX: male  : 1980     DATE: 2024     Assessment and Plan:     Problem List Items Addressed This Visit       Type 2 diabetes mellitus without complication, with long-term current use of insulin (HCC) - Primary       Lab Results   Component Value Date    HGBA1C 6.0 2024   Well-controlled  Continue metformin 1000 mg twice daily with meals  Continue statin  Recommend yearly ophthalmology and podiatry evaluation         Relevant Orders    POCT hemoglobin A1c (Completed)    Erectile dysfunction due to arterial insufficiency    Relevant Medications    sildenafil (VIAGRA) 50 MG tablet    Other Relevant Orders    Testosterone, free, total     Other Visit Diagnoses       Annual physical exam                Immunizations and preventive care screenings were discussed with patient today. Appropriate education was printed on patient's after visit summary.    Discussed risks and benefits of prostate cancer screening. We discussed the controversial history of PSA screening for prostate cancer in the [place] as well as the risk of over detection and over treatment of prostate cancer by way of PSA screening.  The patient understands that PSA blood testing is an imperfect way to screen for prostate cancer and that elevated PSA levels in the blood may also be caused by infection, inflammation, prostatic trauma or manipulation, urological procedures, or by benign prostatic enlargement.    The role of the digital rectal examination in prostate cancer screening was also discussed and I discussed with him that there is large interobserver variability in the findings of digital rectal examination.    Counseling:  Alcohol/drug use: discussed moderation in alcohol intake, the recommendations for healthy alcohol use, and avoidance of illicit drug use.  Exercise: the importance of  regular exercise/physical activity was discussed. Recommend exercise 3-5 times per week for at least 30 minutes.          Return in about 3 months (around 8/6/2024) for Next scheduled follow up jing.     Chief Complaint:     Chief Complaint   Patient presents with    Follow-up     No concerns       History of Present Illness:     Adult Annual Physical   Patient here for a comprehensive physical exam. The patient reports problems - erectile dysfunction and low libido .    43-year-old male with a history of type 2 diabetes presents today for follow-up and wellness visit.  He does reports erectile dysfunction for the past couple of months.  He reports occasional decreased libido as well.  He has been taking his metformin regularly.  He has made a lot of lifestyle changes he is following a low carbohydrate diet.  He has been more active and exercising more often.        Diet and Physical Activity  Diet/Nutrition: diabetic diet.   Exercise: walking.      Depression Screening  PHQ-2/9 Depression Screening           General Health  Sleep: sleeps well.   Hearing: normal - bilateral.  Vision: no vision problems.   Dental: brushes teeth once daily.        Health  Symptoms include: erectile dysfunction    Advanced Care Planning  Do you have an advanced directive? no  Do you have a durable medical power of ? no  ACP document given to patient? no     Review of Systems:     Review of Systems   Constitutional:  Negative for chills, diaphoresis, fatigue and fever.   Respiratory:  Negative for shortness of breath and wheezing.    Cardiovascular:  Negative for chest pain and palpitations.   Gastrointestinal:  Negative for abdominal pain, diarrhea, nausea and vomiting.   Endocrine: Negative for polydipsia, polyphagia and polyuria.   Genitourinary:  Negative for difficulty urinating, dysuria, hematuria and urgency.   Musculoskeletal:  Negative for back pain.   Skin:  Negative for rash.   Neurological:  Negative for  seizures, syncope and headaches.      Past Medical History:     Past Medical History:   Diagnosis Date    Bell's palsy     Hyperkalemia       Past Surgical History:     History reviewed. No pertinent surgical history.   Family History:     Family History   Problem Relation Age of Onset    Diabetes Mother       Social History:     Social History     Socioeconomic History    Marital status: Single     Spouse name: None    Number of children: None    Years of education: None    Highest education level: None   Occupational History    None   Tobacco Use    Smoking status: Former     Current packs/day: 1.00     Types: Cigarettes    Smokeless tobacco: Never   Vaping Use    Vaping status: Never Used   Substance and Sexual Activity    Alcohol use: Not Currently     Comment: Social    Drug use: Never    Sexual activity: Yes     Partners: Female   Other Topics Concern    None   Social History Narrative    None     Social Determinants of Health     Financial Resource Strain: Low Risk  (2/19/2024)    Overall Financial Resource Strain (CARDIA)     Difficulty of Paying Living Expenses: Not very hard   Food Insecurity: No Food Insecurity (2/19/2024)    Hunger Vital Sign     Worried About Running Out of Food in the Last Year: Never true     Ran Out of Food in the Last Year: Never true   Transportation Needs: No Transportation Needs (2/19/2024)    PRAPARE - Transportation     Lack of Transportation (Medical): No     Lack of Transportation (Non-Medical): No   Physical Activity: Not on file   Stress: Not on file   Social Connections: Not on file   Intimate Partner Violence: Not on file   Housing Stability: Low Risk  (3/18/2024)    Housing Stability Vital Sign     Unable to Pay for Housing in the Last Year: No     Number of Places Lived in the Last Year: 1     Unstable Housing in the Last Year: No      Current Medications:     Current Outpatient Medications   Medication Sig Dispense Refill    atorvastatin (LIPITOR) 40 mg tablet Take 1  tablet (40 mg total) by mouth daily with dinner 90 tablet 3    sildenafil (VIAGRA) 50 MG tablet Take 1 tablet (50 mg total) by mouth daily as needed for erectile dysfunction 10 tablet 1    Alcohol Swabs 70 % PADS May substitute brand based on insurance coverage. Check glucose ACHS. 200 each 0    artificial tear (LUBRIFRESH P.M.) 83-15 % ophthalmic ointment Administer to both eyes 3 (three) times a day As Needed      Blood Glucose Monitoring Suppl (OneTouch Verio Reflect) w/Device KIT May substitute brand based on insurance coverage. Check glucose ACHS. 1 kit 0    Continuous Blood Gluc Sensor (FreeStyle Cullen 3 Sensor) MISC USE 1 EVERY 14 DAYS 2 each 0    glucose blood (OneTouch Verio) test strip May substitute brand based on insurance coverage. Check glucose ACHS. 200 each 0    Insulin Pen Needle (BD Pen Needle Ceci 2nd Gen) 32G X 4 MM MISC For use with insulin pen. Pharmacy may dispense brand covered by insurance. 100 each 0    Insulin Pen Needle (BD Pen Needle Ceci 2nd Gen) 32G X 4 MM MISC For use with insulin pen. Pharmacy may dispense brand covered by insurance. 100 each 0    metFORMIN (GLUCOPHAGE-XR) 500 mg 24 hr tablet Take 1000 mg twice daily with food 120 tablet 5    OneTouch Delica Lancets 33G MISC May substitute brand based on insurance coverage. Check glucose ACHS. 200 each 0    pantoprazole (PROTONIX) 40 mg tablet Take 1 tablet (40 mg total) by mouth daily before breakfast 30 tablet 0     No current facility-administered medications for this visit.      Allergies:     No Known Allergies   Physical Exam:     /77 (BP Location: Left arm, Patient Position: Sitting, Cuff Size: Large)   Pulse 89   Temp 98 °F (36.7 °C) (Temporal)   Resp 18   Ht 6' (1.829 m)   Wt 111 kg (244 lb 3.2 oz)   SpO2 97%   BMI 33.12 kg/m²     Physical Exam  Vitals reviewed.   Constitutional:       General: He is not in acute distress.     Appearance: Normal appearance. He is well-developed. He is not ill-appearing,  toxic-appearing or diaphoretic.   HENT:      Head: Normocephalic and atraumatic.      Right Ear: Tympanic membrane, ear canal and external ear normal. There is no impacted cerumen.      Left Ear: Tympanic membrane, ear canal and external ear normal. There is no impacted cerumen.      Nose: Nose normal.      Mouth/Throat:      Mouth: Mucous membranes are moist.      Pharynx: No oropharyngeal exudate or posterior oropharyngeal erythema.   Eyes:      General: No scleral icterus.        Right eye: No discharge.         Left eye: No discharge.      Extraocular Movements: Extraocular movements intact.      Conjunctiva/sclera: Conjunctivae normal.      Pupils: Pupils are equal, round, and reactive to light.   Cardiovascular:      Rate and Rhythm: Normal rate and regular rhythm.      Heart sounds: Normal heart sounds. No murmur heard.     No friction rub. No gallop.   Pulmonary:      Effort: Pulmonary effort is normal. No respiratory distress.      Breath sounds: Normal breath sounds. No stridor. No wheezing or rhonchi.   Abdominal:      General: Bowel sounds are normal. There is no distension.      Palpations: Abdomen is soft. There is no mass.      Tenderness: There is no abdominal tenderness. There is no guarding.   Musculoskeletal:         General: Normal range of motion.      Cervical back: Normal range of motion.      Right lower leg: No edema.      Left lower leg: No edema.   Skin:     General: Skin is warm.      Capillary Refill: Capillary refill takes less than 2 seconds.   Neurological:      General: No focal deficit present.      Mental Status: He is alert and oriented to person, place, and time.      Cranial Nerves: No cranial nerve deficit.      Gait: Gait normal.   Psychiatric:         Mood and Affect: Mood normal.         Behavior: Behavior normal.          William Lucas MD  [facility] PR[facility]

## 2024-06-12 ENCOUNTER — PATIENT OUTREACH (OUTPATIENT)
Dept: FAMILY MEDICINE CLINIC | Facility: CLINIC | Age: 44
End: 2024-06-12

## 2024-06-12 NOTE — PROGRESS NOTES
I called the patient to follow up.  He states he is feeling well.  I congratulated him on reducing his A1C to 6.0 from 11.2.  The patient notes he continues to watch his diet carefully and is exercising frequently.  He states he is checking his feet daily and denies any skin breakdown.  He notes he is taking metformin 1000 bid as ordered.    The patient stated he had an eye exam 5/25 and reportedly everything was normal.  He notes he wears contact lenses and glasses. The patient is aware he needs to see Podiatry.    I will call the patient next month to remind him of his appointment with Komal

## 2024-06-28 DIAGNOSIS — E11.9 DIABETES MELLITUS, NEW ONSET (HCC): ICD-10-CM

## 2024-06-28 DIAGNOSIS — E78.5 DYSLIPIDEMIA: ICD-10-CM

## 2024-06-28 RX ORDER — BLOOD-GLUCOSE SENSOR
1 EACH MISCELLANEOUS
Qty: 6 EACH | Refills: 0 | Status: SHIPPED | OUTPATIENT
Start: 2024-06-28

## 2024-06-28 RX ORDER — ATORVASTATIN CALCIUM 40 MG/1
40 TABLET, FILM COATED ORAL
Qty: 90 TABLET | Refills: 3 | Status: SHIPPED | OUTPATIENT
Start: 2024-06-28

## 2024-06-28 RX ORDER — ATORVASTATIN CALCIUM 40 MG/1
40 TABLET, FILM COATED ORAL
Qty: 90 TABLET | Refills: 3 | Status: SHIPPED | OUTPATIENT
Start: 2024-06-28 | End: 2024-06-28

## 2024-06-28 RX ORDER — BLOOD-GLUCOSE SENSOR
1 EACH MISCELLANEOUS
Qty: 6 EACH | Refills: 0 | Status: SHIPPED | OUTPATIENT
Start: 2024-06-28 | End: 2024-06-28

## 2024-07-12 ENCOUNTER — PATIENT OUTREACH (OUTPATIENT)
Dept: FAMILY MEDICINE CLINIC | Facility: CLINIC | Age: 44
End: 2024-07-12

## 2024-07-12 NOTE — PROGRESS NOTES
I called the patient to remind him of his appointment with Binh on Monday, 7/15 at 0900; he plans on attending.

## 2024-07-15 ENCOUNTER — CLINICAL SUPPORT (OUTPATIENT)
Dept: FAMILY MEDICINE CLINIC | Facility: CLINIC | Age: 44
End: 2024-07-15

## 2024-07-15 ENCOUNTER — PATIENT OUTREACH (OUTPATIENT)
Dept: FAMILY MEDICINE CLINIC | Facility: CLINIC | Age: 44
End: 2024-07-15

## 2024-07-15 VITALS — SYSTOLIC BLOOD PRESSURE: 119 MMHG | DIASTOLIC BLOOD PRESSURE: 72 MMHG | HEART RATE: 81 BPM

## 2024-07-15 DIAGNOSIS — Z79.4 TYPE 2 DIABETES MELLITUS WITHOUT COMPLICATION, WITH LONG-TERM CURRENT USE OF INSULIN (HCC): Primary | ICD-10-CM

## 2024-07-15 DIAGNOSIS — E11.9 TYPE 2 DIABETES MELLITUS WITHOUT COMPLICATION, WITH LONG-TERM CURRENT USE OF INSULIN (HCC): Primary | ICD-10-CM

## 2024-07-15 NOTE — PROGRESS NOTES
I met the patient at his appointment with Binh.  The patient is doing very well on metformin 1000bid.  Patient's data was reviewed and A1C appears to be 5.7.  It was decided to decrease metformin to 500 bid and see how he does on this dose.    The patient's blood pressure at this visit was 119/72, P81.  Med rec was performed.      I will continue to follow.

## 2024-07-15 NOTE — PROGRESS NOTES
[facility]  [address] [place] [address], [address]  [facility] [address] [phone]  Clinical Pharmacy Consultation    Encounter provider: Binh Morgan, Pharmacist    This patient was referred by PCP for pharmacy consultation. Thank you for the referral. Sending this note to PCP.    Assessment/Plan  1. Type 2 diabetes mellitus without complication, with long-term current use of insulin (HCC)      Type 2 diabetes:    Current Diabetes Regimen:  Metformin ER 1000 mg twice daily   Permission given to titrate to 1000 mg twice daily, and decrease insulin as able once freestyle tala is attached  Pt was able to stop insulin and remain BG control    Visit focused on t2dm and congratulating pt  Also complete med rec   Blood glucose very well controlled   Most recent A1c below goal of <6.5%.   Currently asymptomatic and adherant  with treatment plan  Results of recent BG monitoring: below                     New DM regimen:   Metformin 1000 mg twice daily   Permission given to 1/2 dose and monitor and eventually d/c therapy if tala remains stable   FUTURE: PRN f/u      Hyperlipidemia without clinical ASCVD event:  The 10-year ASCVD risk score (Marcela DK, et al., 2019) is: 5.3%    Values used to calculate the score:      Age: 44 years      Sex: Male      Is Non- : No      Diabetic: Yes      Tobacco smoker: No      Systolic Blood Pressure: 118 mmHg      Is BP treated: No      HDL Cholesterol: 41 mg/dL      Total Cholesterol: 266 mg/dL   Most recent lipid panel is within an acceptable range  2018 ACC/AHA blood cholesterol guidelines recommends moderate-intensity statin. Patient currently on moderate-intensity statin and tolerating well without side effects.   Continue atorv as prescribed. High int okay,may be able to dec    HTN:   BP goal: <130/80 mmHg   In-office BP below goal, HR acceptable.   119/72 hr 81      FYI to PCP:   Will pend Rx for PCP  signature/concurrence  Orders placed under CPA    Medication list was updated and discussed with patient.   Discontinued medications: pen needles, omeprazole, etc other meds pt was not taking    Clinically significant drug interactions identified: none   Side effects from medication(s) reported: none    The patient communicates understanding of the treatment plan.    M*Modal Dragon software was used to dictate this note. It may contain errors with dictating incorrect words or incorrect spelling. Please contact the provider directly with any questions. Thank you for your understanding.    Subjective  Jhonny Eastman is a 44 y.o. male who presents in-person for follow-up.  Reason for visit: diabetes.   No particular questions or concerns.  HPI     Social History     Tobacco Use   Smoking Status Former    Current packs/day: 1.00    Types: Cigarettes   Smokeless Tobacco Never        No Known Allergies       Current Outpatient Medications:     atorvastatin (LIPITOR) 40 mg tablet, Take 1 tablet (40 mg total) by mouth daily with dinner, Disp: 90 tablet, Rfl: 3    Continuous Glucose Sensor (FreeStyle Cullen 3 Sensor) MISC, Apply 1 each topically every 14 (fourteen) days, Disp: 6 each, Rfl: 0    Alcohol Swabs 70 % PADS, May substitute brand based on insurance coverage. Check glucose ACHS., Disp: 200 each, Rfl: 0    artificial tear (LUBRIFRESH P.M.) 83-15 % ophthalmic ointment, Administer to both eyes 3 (three) times a day As Needed, Disp: , Rfl:     Blood Glucose Monitoring Suppl (OneTouch Verio Reflect) w/Device KIT, May substitute brand based on insurance coverage. Check glucose ACHS., Disp: 1 kit, Rfl: 0    glucose blood (OneTouch Verio) test strip, May substitute brand based on insurance coverage. Check glucose ACHS., Disp: 200 each, Rfl: 0    Insulin Pen Needle (BD Pen Needle Ceci 2nd Gen) 32G X 4 MM MISC, For use with insulin pen. Pharmacy may dispense brand covered by insurance., Disp: 100 each, Rfl: 0    Insulin Pen  Needle (BD Pen Needle Ceci 2nd Gen) 32G X 4 MM MISC, For use with insulin pen. Pharmacy may dispense brand covered by insurance., Disp: 100 each, Rfl: 0    metFORMIN (GLUCOPHAGE-XR) 500 mg 24 hr tablet, Take 1000 mg twice daily with food, Disp: 120 tablet, Rfl: 5    OneTouch Delica Lancets 33G MISC, May substitute brand based on insurance coverage. Check glucose ACHS., Disp: 200 each, Rfl: 0    pantoprazole (PROTONIX) 40 mg tablet, Take 1 tablet (40 mg total) by mouth daily before breakfast, Disp: 30 tablet, Rfl: 0    sildenafil (VIAGRA) 50 MG tablet, Take 1 tablet (50 mg total) by mouth daily as needed for erectile dysfunction, Disp: 10 tablet, Rfl: 1    Objective  Vitals:   Wt Readings from Last 3 Encounters:   05/06/24 111 kg (244 lb 3.2 oz)   03/18/24 114 kg (251 lb 4 oz)   02/19/24 113 kg (249 lb)     Temp Readings from Last 3 Encounters:   05/06/24 98 °F (36.7 °C) (Temporal)   03/18/24 97.6 °F (36.4 °C) (Temporal)   02/01/24 97.7 °F (36.5 °C)     BP Readings from Last 3 Encounters:   05/06/24 118/77   04/15/24 117/68   03/18/24 128/72     Pulse Readings from Last 3 Encounters:   05/06/24 89   04/15/24 86   03/18/24 86       Labs:   Lab Results   Component Value Date/Time    HGBA1C 6.0 05/06/2024 08:42 AM    HGBA1C 11.2 (H) 01/30/2024 04:27 AM       Lab Results   Component Value Date/Time    BUN 13 01/31/2024 04:37 AM    BUN 19 01/30/2024 12:54 PM    BUN 19 01/30/2024 03:28 AM    CREATININE 1.01 01/31/2024 04:37 AM    CREATININE 1.23 01/30/2024 12:54 PM    CREATININE 1.01 01/30/2024 03:28 AM    EGFR 91 01/31/2024 04:37 AM    EGFR 72 01/30/2024 12:54 PM    EGFR 91 01/30/2024 03:28 AM       Lab Results   Component Value Date/Time    CREATININEUR 90.0 01/31/2024 12:16 PM    LABMICR 9.5 01/31/2024 12:16 PM    MICROALBCRE 11 01/31/2024 12:16 PM       Lab Results   Component Value Date/Time    CHOLESTEROL 266 (H) 01/30/2024 03:28 AM    TRIG 248 (H) 01/30/2024 03:28 AM    HDL 41 01/30/2024 03:28 AM    LDLCALC 175 (H)  "01/30/2024 03:28 AM       Eye Exam: No results found for: \"RIGHTDIABRET\", \"RIGHTMACEDEM\", \"RIGHTOTHERRE\", \"LEFTDIABRET\", \"LEFTMACEDEM\", \"LEFTOTHERRE\"  Binh Morgan, Pharmacist    -----------------------------------------------------------------------------------------------    Pharmacist Tracking Tool  Reason For Outreach: Embedded Pharmacist  Demographics:  Intervention Method: In Person  Type of Intervention: Follow-Up  Topics Addressed: Diabetes  Pharmacologic Interventions: Med Rec  Non-Pharmacologic Interventions: Care coordination, Chart update, Disease state education, Home Monitoring, Medication Monitoring, and Personal CGM  Time:  Direct Patient Care:  15  mins  Care Coordination:  20  mins  Recommendation Recipient: Patient/Caregiver  Outcome: Accepted   "

## 2024-09-19 ENCOUNTER — PATIENT OUTREACH (OUTPATIENT)
Dept: FAMILY MEDICINE CLINIC | Facility: CLINIC | Age: 44
End: 2024-09-19

## 2024-09-19 NOTE — PROGRESS NOTES
I called the patient but received voicemail.  Message was left asking for a return call.  I will continue further outreach.

## 2024-10-03 ENCOUNTER — PATIENT OUTREACH (OUTPATIENT)
Dept: FAMILY MEDICINE CLINIC | Facility: CLINIC | Age: 44
End: 2024-10-03

## 2024-10-03 DIAGNOSIS — E11.9 DIABETES MELLITUS, NEW ONSET (HCC): ICD-10-CM

## 2024-10-03 NOTE — PROGRESS NOTES
I called the patient to follow up.  I woke him up.  He states he has been feeling well.  He notes he has not been checking his blood sugars since he has no supplies but is taking metformin 500bid.  The patient states he continues checking his feet and denies any skin breakdown.  Sensor refill sent to PCP.      I informed the patient he is due for a PCP appointment and flu shot; note sent to clerical.  When the patient's A1C is rechecked and if normal, I plan on closing this case.

## 2024-10-08 RX ORDER — BLOOD-GLUCOSE SENSOR
1 EACH MISCELLANEOUS
Qty: 6 EACH | Refills: 3 | Status: SHIPPED | OUTPATIENT
Start: 2024-10-08

## 2024-12-04 ENCOUNTER — RA CDI HCC (OUTPATIENT)
Dept: OTHER | Facility: HOSPITAL | Age: 44
End: 2024-12-04

## 2024-12-04 NOTE — PROGRESS NOTES
HCC coding opportunities          Chart Reviewed number of suggestions sent to Provider: 1  E11.65     Patients Insurance        Commercial Insurance: [employer]

## 2024-12-09 ENCOUNTER — OFFICE VISIT (OUTPATIENT)
Dept: FAMILY MEDICINE CLINIC | Facility: CLINIC | Age: 44
End: 2024-12-09

## 2024-12-09 VITALS
BODY MASS INDEX: 30.88 KG/M2 | HEIGHT: 72 IN | WEIGHT: 228 LBS | HEART RATE: 84 BPM | DIASTOLIC BLOOD PRESSURE: 74 MMHG | TEMPERATURE: 97.5 F | SYSTOLIC BLOOD PRESSURE: 116 MMHG | RESPIRATION RATE: 16 BRPM | OXYGEN SATURATION: 96 %

## 2024-12-09 DIAGNOSIS — E11.9 TYPE 2 DIABETES MELLITUS WITHOUT COMPLICATION, WITH LONG-TERM CURRENT USE OF INSULIN (HCC): Primary | ICD-10-CM

## 2024-12-09 DIAGNOSIS — E11.9 DIABETES MELLITUS, NEW ONSET (HCC): ICD-10-CM

## 2024-12-09 DIAGNOSIS — Z79.4 TYPE 2 DIABETES MELLITUS WITHOUT COMPLICATION, WITH LONG-TERM CURRENT USE OF INSULIN (HCC): Primary | ICD-10-CM

## 2024-12-09 DIAGNOSIS — Z23 ENCOUNTER FOR IMMUNIZATION: ICD-10-CM

## 2024-12-09 DIAGNOSIS — Z23 NEED FOR COVID-19 VACCINE: ICD-10-CM

## 2024-12-09 DIAGNOSIS — E78.5 DYSLIPIDEMIA: ICD-10-CM

## 2024-12-09 LAB — SL AMB POCT HEMOGLOBIN AIC: 5.5 (ref ?–6.5)

## 2024-12-09 PROCEDURE — 90471 IMMUNIZATION ADMIN: CPT | Performed by: FAMILY MEDICINE

## 2024-12-09 PROCEDURE — 99214 OFFICE O/P EST MOD 30 MIN: CPT | Performed by: FAMILY MEDICINE

## 2024-12-09 PROCEDURE — 91320 SARSCV2 VAC 30MCG TRS-SUC IM: CPT | Performed by: FAMILY MEDICINE

## 2024-12-09 PROCEDURE — 90656 IIV3 VACC NO PRSV 0.5 ML IM: CPT | Performed by: FAMILY MEDICINE

## 2024-12-09 PROCEDURE — 83036 HEMOGLOBIN GLYCOSYLATED A1C: CPT | Performed by: FAMILY MEDICINE

## 2024-12-09 PROCEDURE — 90480 ADMN SARSCOV2 VAC 1/ONLY CMP: CPT | Performed by: FAMILY MEDICINE

## 2024-12-09 RX ORDER — ATORVASTATIN CALCIUM 40 MG/1
40 TABLET, FILM COATED ORAL
Qty: 90 TABLET | Refills: 3 | Status: SHIPPED | OUTPATIENT
Start: 2024-12-09

## 2024-12-09 RX ORDER — METFORMIN HYDROCHLORIDE 500 MG/1
TABLET, EXTENDED RELEASE ORAL
Qty: 120 TABLET | Refills: 5 | Status: SHIPPED | OUTPATIENT
Start: 2024-12-09

## 2024-12-09 NOTE — ASSESSMENT & PLAN NOTE
Stable  Continue statin  Orders:    atorvastatin (LIPITOR) 40 mg tablet; Take 1 tablet (40 mg total) by mouth daily with dinner    Lipid panel; Future

## 2024-12-09 NOTE — PROGRESS NOTES
Name: Jhonny Eastman      : 1980      MRN: [id number]  Encounter Provider: William Lucas MD  Encounter Date: 2024   Encounter department: [facility]  :  Assessment & Plan  Type 2 diabetes mellitus without complication, with long-term current use of insulin (HCC)    Lab Results   Component Value Date    HGBA1C 6.0 2024   Well-controlled  Continue metformin  Continue low carbohydrate diet     Orders:    POCT hemoglobin A1c    CBC and differential; Future    Comprehensive metabolic panel; Future    Dyslipidemia  Stable  Continue statin  Orders:    atorvastatin (LIPITOR) 40 mg tablet; Take 1 tablet (40 mg total) by mouth daily with dinner    Lipid panel; Future    Diabetes mellitus, new onset (HCC)    Lab Results   Component Value Date    HGBA1C 6.0 2024       Orders:    metFORMIN (GLUCOPHAGE-XR) 500 mg 24 hr tablet; Take 1000 mg twice daily with food    Encounter for immunization    Orders:    influenza vaccine preservative-free 0.5 mL IM (Fluzone, Afluria, Fluarix, Flulaval)    Need for COVID-19 vaccine    Orders:    COVID-19 Pfizer mRNA vaccine 12 yr and older (Comirnaty pre-filled syringe)           History of Present Illness     44-year-old male with a history of type 2 diabetes who presents today for follow-up.  He is doing well overall.  He is taking his medications regularly.  He is following a low carbohydrate diet.  He has lost some weight through lifestyle modification.      Review of Systems   Constitutional:  Negative for chills, diaphoresis, fatigue and fever.   Respiratory:  Negative for shortness of breath and wheezing.    Cardiovascular:  Negative for chest pain and palpitations.   Gastrointestinal:  Negative for abdominal pain, diarrhea, nausea and vomiting.   Endocrine: Negative for polydipsia, polyphagia and polyuria.   Genitourinary:  Negative for difficulty urinating, dysuria, hematuria and urgency.   Musculoskeletal:  Negative  for back pain.   Skin:  Negative for rash.   Neurological:  Negative for seizures, syncope and headaches.   Psychiatric/Behavioral:  Negative for dysphoric mood.           Objective   /74 (BP Location: Left arm, Patient Position: Sitting, Cuff Size: Standard)   Pulse 84   Temp 97.5 °F (36.4 °C) (Temporal)   Resp 16   Ht 6' (1.829 m)   Wt 103 kg (228 lb)   SpO2 96%   BMI 30.92 kg/m²      Physical Exam  Vitals reviewed.   Constitutional:       General: He is not in acute distress.     Appearance: Normal appearance. He is well-developed. He is not ill-appearing, toxic-appearing or diaphoretic.   HENT:      Head: Normocephalic and atraumatic.      Right Ear: External ear normal.      Left Ear: External ear normal.      Nose: Nose normal.      Mouth/Throat:      Mouth: Mucous membranes are moist.      Pharynx: No oropharyngeal exudate or posterior oropharyngeal erythema.   Eyes:      General: No scleral icterus.        Right eye: No discharge.         Left eye: No discharge.      Extraocular Movements: Extraocular movements intact.      Conjunctiva/sclera: Conjunctivae normal.   Cardiovascular:      Rate and Rhythm: Normal rate and regular rhythm.      Heart sounds: Normal heart sounds. No murmur heard.     No friction rub. No gallop.   Pulmonary:      Effort: Pulmonary effort is normal. No respiratory distress.      Breath sounds: Normal breath sounds. No stridor. No wheezing or rhonchi.   Abdominal:      General: Bowel sounds are normal. There is no distension.      Palpations: Abdomen is soft. There is no mass.      Tenderness: There is no abdominal tenderness. There is no guarding.   Musculoskeletal:         General: Normal range of motion.      Cervical back: Normal range of motion.      Right lower leg: No edema.      Left lower leg: No edema.   Skin:     General: Skin is warm.      Capillary Refill: Capillary refill takes less than 2 seconds.   Neurological:      General: No focal deficit present.       Mental Status: He is alert and oriented to person, place, and time.      Gait: Gait normal.   Psychiatric:         Mood and Affect: Mood normal.         Behavior: Behavior normal.

## 2024-12-09 NOTE — ASSESSMENT & PLAN NOTE
Lab Results   Component Value Date    HGBA1C 6.0 05/06/2024   Well-controlled  Continue metformin  Continue low carbohydrate diet     Orders:    POCT hemoglobin A1c    CBC and differential; Future    Comprehensive metabolic panel; Future

## 2025-01-09 ENCOUNTER — PATIENT OUTREACH (OUTPATIENT)
Dept: FAMILY MEDICINE CLINIC | Facility: CLINIC | Age: 45
End: 2025-01-09

## 2025-01-09 NOTE — PROGRESS NOTES
Chart reviewed.  Patient was seen last month for a regular follow up.  His A1C decreased from 6.0 to 5.5.  It was previously discussed with the patient that if his A1C remained stable, I will close his case.    Case is being closed.

## 2025-02-24 ENCOUNTER — TELEPHONE (OUTPATIENT)
Dept: FAMILY MEDICINE CLINIC | Facility: CLINIC | Age: 45
End: 2025-02-24

## 2025-02-24 NOTE — TELEPHONE ENCOUNTER
Called Pt to inform they have refills of these two prescriptions available in their pharmacy ([facility] [place])  and there is no need for a new script   Advised to contact Pharmacy directly and call back if they encounter any issues.

## 2025-02-24 NOTE — TELEPHONE ENCOUNTER
Patient came today requesting a medication to be refill:     atorvastatin (LIPITOR) 40 mg tablet.     metFORMIN (GLUCOPHAGE-XR) 500 mg.    To be send to [facility] pharmacy.